# Patient Record
Sex: FEMALE | Race: WHITE | NOT HISPANIC OR LATINO | URBAN - METROPOLITAN AREA
[De-identification: names, ages, dates, MRNs, and addresses within clinical notes are randomized per-mention and may not be internally consistent; named-entity substitution may affect disease eponyms.]

---

## 2017-11-02 ENCOUNTER — OUTPATIENT (OUTPATIENT)
Dept: OUTPATIENT SERVICES | Facility: HOSPITAL | Age: 82
LOS: 1 days | End: 2017-11-02
Payer: MEDICARE

## 2017-11-02 ENCOUNTER — APPOINTMENT (OUTPATIENT)
Dept: MAMMOGRAPHY | Facility: IMAGING CENTER | Age: 82
End: 2017-11-02
Payer: MEDICARE

## 2017-11-02 DIAGNOSIS — Z00.8 ENCOUNTER FOR OTHER GENERAL EXAMINATION: ICD-10-CM

## 2017-11-02 PROCEDURE — G0202: CPT | Mod: 26

## 2017-11-02 PROCEDURE — 77067 SCR MAMMO BI INCL CAD: CPT

## 2017-11-02 PROCEDURE — 77063 BREAST TOMOSYNTHESIS BI: CPT

## 2017-11-02 PROCEDURE — 77063 BREAST TOMOSYNTHESIS BI: CPT | Mod: 26

## 2018-11-07 ENCOUNTER — APPOINTMENT (OUTPATIENT)
Dept: MAMMOGRAPHY | Facility: IMAGING CENTER | Age: 83
End: 2018-11-07
Payer: MEDICARE

## 2018-11-07 ENCOUNTER — OUTPATIENT (OUTPATIENT)
Dept: OUTPATIENT SERVICES | Facility: HOSPITAL | Age: 83
LOS: 1 days | End: 2018-11-07
Payer: MEDICARE

## 2018-11-07 DIAGNOSIS — Z00.00 ENCOUNTER FOR GENERAL ADULT MEDICAL EXAMINATION WITHOUT ABNORMAL FINDINGS: ICD-10-CM

## 2018-11-07 PROCEDURE — 77067 SCR MAMMO BI INCL CAD: CPT | Mod: 26

## 2018-11-07 PROCEDURE — 77067 SCR MAMMO BI INCL CAD: CPT

## 2019-11-08 ENCOUNTER — APPOINTMENT (OUTPATIENT)
Dept: MAMMOGRAPHY | Facility: IMAGING CENTER | Age: 84
End: 2019-11-08
Payer: MEDICARE

## 2019-11-08 ENCOUNTER — OUTPATIENT (OUTPATIENT)
Dept: OUTPATIENT SERVICES | Facility: HOSPITAL | Age: 84
LOS: 1 days | End: 2019-11-08
Payer: MEDICARE

## 2019-11-08 DIAGNOSIS — Z00.8 ENCOUNTER FOR OTHER GENERAL EXAMINATION: ICD-10-CM

## 2019-11-08 PROCEDURE — 77067 SCR MAMMO BI INCL CAD: CPT | Mod: 26

## 2019-11-08 PROCEDURE — 77067 SCR MAMMO BI INCL CAD: CPT

## 2019-11-08 PROCEDURE — 77063 BREAST TOMOSYNTHESIS BI: CPT

## 2019-11-08 PROCEDURE — 77063 BREAST TOMOSYNTHESIS BI: CPT | Mod: 26

## 2019-12-17 ENCOUNTER — RESULT REVIEW (OUTPATIENT)
Age: 84
End: 2019-12-17

## 2019-12-20 ENCOUNTER — RESULT REVIEW (OUTPATIENT)
Age: 84
End: 2019-12-20

## 2019-12-30 ENCOUNTER — APPOINTMENT (OUTPATIENT)
Dept: THORACIC SURGERY | Facility: CLINIC | Age: 84
End: 2019-12-30
Payer: MEDICARE

## 2019-12-30 VITALS
RESPIRATION RATE: 15 BRPM | WEIGHT: 152 LBS | OXYGEN SATURATION: 98 % | TEMPERATURE: 98.1 F | SYSTOLIC BLOOD PRESSURE: 165 MMHG | BODY MASS INDEX: 28.33 KG/M2 | HEART RATE: 81 BPM | DIASTOLIC BLOOD PRESSURE: 83 MMHG | HEIGHT: 61.5 IN

## 2019-12-30 VITALS — SYSTOLIC BLOOD PRESSURE: 173 MMHG | DIASTOLIC BLOOD PRESSURE: 80 MMHG

## 2019-12-30 DIAGNOSIS — Z82.49 FAMILY HISTORY OF ISCHEMIC HEART DISEASE AND OTHER DISEASES OF THE CIRCULATORY SYSTEM: ICD-10-CM

## 2019-12-30 DIAGNOSIS — Z85.89 PERSONAL HISTORY OF MALIGNANT NEOPLASM OF OTHER ORGANS AND SYSTEMS: ICD-10-CM

## 2019-12-30 DIAGNOSIS — Z87.09 PERSONAL HISTORY OF OTHER DISEASES OF THE RESPIRATORY SYSTEM: ICD-10-CM

## 2019-12-30 DIAGNOSIS — Z80.0 FAMILY HISTORY OF MALIGNANT NEOPLASM OF DIGESTIVE ORGANS: ICD-10-CM

## 2019-12-30 DIAGNOSIS — S72.001D FRACTURE OF UNSPECIFIED PART OF NECK OF RIGHT FEMUR, SUBSEQUENT ENCOUNTER FOR CLOSED FRACTURE WITH ROUTINE HEALING: ICD-10-CM

## 2019-12-30 DIAGNOSIS — Z80.8 FAMILY HISTORY OF MALIGNANT NEOPLASM OF OTHER ORGANS OR SYSTEMS: ICD-10-CM

## 2019-12-30 DIAGNOSIS — Z86.03 PERSONAL HISTORY OF NEOPLASM OF UNCERTAIN BEHAVIOR: ICD-10-CM

## 2019-12-30 PROCEDURE — 99205 OFFICE O/P NEW HI 60 MIN: CPT

## 2019-12-30 RX ORDER — BETAMETHASONE DIPROPIONATE 0.5 MG/G
0.05 CREAM, AUGMENTED TOPICAL
Qty: 50 | Refills: 0 | Status: DISCONTINUED | COMMUNITY
Start: 2019-07-25

## 2020-01-07 ENCOUNTER — FORM ENCOUNTER (OUTPATIENT)
Age: 85
End: 2020-01-07

## 2020-01-08 ENCOUNTER — OUTPATIENT (OUTPATIENT)
Dept: OUTPATIENT SERVICES | Facility: HOSPITAL | Age: 85
LOS: 1 days | End: 2020-01-08
Payer: MEDICARE

## 2020-01-08 ENCOUNTER — APPOINTMENT (OUTPATIENT)
Dept: NUCLEAR MEDICINE | Facility: IMAGING CENTER | Age: 85
End: 2020-01-08
Payer: MEDICARE

## 2020-01-08 DIAGNOSIS — R91.1 SOLITARY PULMONARY NODULE: ICD-10-CM

## 2020-01-08 PROCEDURE — A9552: CPT

## 2020-01-08 PROCEDURE — 78815 PET IMAGE W/CT SKULL-THIGH: CPT | Mod: 26,PI

## 2020-01-08 PROCEDURE — 78815 PET IMAGE W/CT SKULL-THIGH: CPT

## 2020-01-28 ENCOUNTER — OUTPATIENT (OUTPATIENT)
Dept: OUTPATIENT SERVICES | Facility: HOSPITAL | Age: 85
LOS: 1 days | End: 2020-01-28
Payer: MEDICARE

## 2020-01-28 VITALS
SYSTOLIC BLOOD PRESSURE: 154 MMHG | WEIGHT: 153 LBS | HEIGHT: 59 IN | RESPIRATION RATE: 16 BRPM | DIASTOLIC BLOOD PRESSURE: 90 MMHG | OXYGEN SATURATION: 100 % | HEART RATE: 55 BPM | TEMPERATURE: 98 F

## 2020-01-28 DIAGNOSIS — Z96.641 PRESENCE OF RIGHT ARTIFICIAL HIP JOINT: Chronic | ICD-10-CM

## 2020-01-28 DIAGNOSIS — J90 PLEURAL EFFUSION, NOT ELSEWHERE CLASSIFIED: ICD-10-CM

## 2020-01-28 LAB
ANION GAP SERPL CALC-SCNC: 15 MMO/L — HIGH (ref 7–14)
BLD GP AB SCN SERPL QL: NEGATIVE — SIGNIFICANT CHANGE UP
BUN SERPL-MCNC: 24 MG/DL — HIGH (ref 7–23)
CALCIUM SERPL-MCNC: 9.8 MG/DL — SIGNIFICANT CHANGE UP (ref 8.4–10.5)
CHLORIDE SERPL-SCNC: 100 MMOL/L — SIGNIFICANT CHANGE UP (ref 98–107)
CO2 SERPL-SCNC: 24 MMOL/L — SIGNIFICANT CHANGE UP (ref 22–31)
CREAT SERPL-MCNC: 0.86 MG/DL — SIGNIFICANT CHANGE UP (ref 0.5–1.3)
GLUCOSE SERPL-MCNC: 95 MG/DL — SIGNIFICANT CHANGE UP (ref 70–99)
HBA1C BLD-MCNC: 6.1 % — HIGH (ref 4–5.6)
HCT VFR BLD CALC: 41 % — SIGNIFICANT CHANGE UP (ref 34.5–45)
HGB BLD-MCNC: 13 G/DL — SIGNIFICANT CHANGE UP (ref 11.5–15.5)
MCHC RBC-ENTMCNC: 28.6 PG — SIGNIFICANT CHANGE UP (ref 27–34)
MCHC RBC-ENTMCNC: 31.7 % — LOW (ref 32–36)
MCV RBC AUTO: 90.1 FL — SIGNIFICANT CHANGE UP (ref 80–100)
NRBC # FLD: 0 K/UL — SIGNIFICANT CHANGE UP (ref 0–0)
PLATELET # BLD AUTO: 196 K/UL — SIGNIFICANT CHANGE UP (ref 150–400)
PMV BLD: 10.8 FL — SIGNIFICANT CHANGE UP (ref 7–13)
POTASSIUM SERPL-MCNC: 4.4 MMOL/L — SIGNIFICANT CHANGE UP (ref 3.5–5.3)
POTASSIUM SERPL-SCNC: 4.4 MMOL/L — SIGNIFICANT CHANGE UP (ref 3.5–5.3)
RBC # BLD: 4.55 M/UL — SIGNIFICANT CHANGE UP (ref 3.8–5.2)
RBC # FLD: 14.3 % — SIGNIFICANT CHANGE UP (ref 10.3–14.5)
RH IG SCN BLD-IMP: POSITIVE — SIGNIFICANT CHANGE UP
SODIUM SERPL-SCNC: 139 MMOL/L — SIGNIFICANT CHANGE UP (ref 135–145)
WBC # BLD: 5.04 K/UL — SIGNIFICANT CHANGE UP (ref 3.8–10.5)
WBC # FLD AUTO: 5.04 K/UL — SIGNIFICANT CHANGE UP (ref 3.8–10.5)

## 2020-01-28 PROCEDURE — 93010 ELECTROCARDIOGRAM REPORT: CPT

## 2020-01-28 RX ORDER — SODIUM CHLORIDE 9 MG/ML
1000 INJECTION, SOLUTION INTRAVENOUS
Refills: 0 | Status: DISCONTINUED | OUTPATIENT
Start: 2020-02-04 | End: 2020-02-05

## 2020-01-28 NOTE — H&P PST ADULT - HEIGHT IN FEET
Terence Gil  [id number]  January 18, 2018    Chief Complaint   Patient presents with    Post-Op Check     Right TKA 1/3/18. Pain remains constant             History: The patient is here follow-up regarding her right knee. The patient is now approximately 2 weeks status post right total knee arthroplasty. She continues to have moderate to severe pain. The patient does have numerous medical issues including chronic pain syndrome and lumbar stenosis. She feels that the pain is radiating from her back. The patient's  past medical history, medications, allergies,  family history, social history, and review of systems have been reviewed, and dated and are recorded in the chart. Ht 5' 6\" (1.676 m)   Wt 168 lb (76.2 kg)   Breastfeeding? No   BMI 27.12 kg/m²     Physical: Ms. Terence Gil appears well, she is in no apparent distress, she demonstrates appropriate mood & affect. She is alert and oriented to person, place and time. She has mild swelling. There is No evidence of DVT seen on physical exam.. She is neurovascularly intact distally. Range of motion is from 0 degrees to 90 degrees. The incision is  clean, dry and intact and without erythema. Strength in the knee is 3+/5. There is no instability with varus and valgus stressing of the knee. There is no pain with range of motion of the hips. Xrays: Three views of the right knee were obtained and reviewed. The prosthesis is well aligned and there is no evidence of loosening. Impression: Status post right Total Knee Arthroplasty      Plan:  She will continue to work aggressively on range of motion and strengthening: Natural history and expected course discussed. Questions answered. Quad strengthening exercises. The patient plans to be discharged from [facility]. I agree with her discharge home tomorrow. I would like the patient to participate in a home physical therapy program.  She was given a Medrol Dosepak.   Hopefully the 4

## 2020-01-28 NOTE — H&P PST ADULT - NSICDXPASTMEDICALHX_GEN_ALL_CORE_FT
PAST MEDICAL HISTORY:  Atrial fibrillation     Basal cell carcinoma and squamous cell carcinoma removed    H/O pleural effusion     HTN (hypertension)     Hyperlipidemia     Prediabetes

## 2020-01-28 NOTE — H&P PST ADULT - NSICDXPROBLEM_GEN_ALL_CORE_FT
PROBLEM DIAGNOSES  Problem: Pleural effusion  Assessment and Plan:       R/O PROBLEM DIAGNOSES  Problem: Snoring  Assessment and Plan:     Problem: Pleural effusion  Assessment and Plan: PROBLEM DIAGNOSES  Problem: Pleural effusion  Assessment and Plan: Pt scheduled for felxible bronchoscopy, left VATS, possible pleurodesis, biopsy, navigational bronchoscopy on 2/4/20.   labs pending, EKG in chart. preop instructions provided to pt. famotidine and chlorhexidine scrubs provided to pt with instructions.      R/O PROBLEM DIAGNOSES  Problem: Atrial fibrillation  Assessment and Plan: Pt had cardiac clearance - will obtain copy    Problem: Snoring  Assessment and Plan: NAIMA precaution  recommended.   OR booking notified via fax

## 2020-01-28 NOTE — H&P PST ADULT - ASSESSMENT
86 yrs old female with h/o SOB, and found to have pleural effusion in Dec 2019 and had paracentesis but for continued fluid collection pt presents for preop eval to have flexible bronchoscopy left VATS, possible pleurodesis, biopsy, navigational bronchoscopy on 2/4/20

## 2020-01-28 NOTE — H&P PST ADULT - RS GEN PE MLT RESP DETAILS PC
clear to auscultation bilaterally/no rhonchi/airway patent/respirations non-labored/no rales/no wheezes

## 2020-01-28 NOTE — H&P PST ADULT - NSANTHOSAYNRD_GEN_A_CORE
never tested/No. NAIMA screening performed.  STOP BANG Legend: 0-2 = LOW Risk; 3-4 = INTERMEDIATE Risk; 5-8 = HIGH Risk

## 2020-01-30 NOTE — REVIEW OF SYSTEMS
[Lower Ext Edema] : lower extremity edema [Cough] : cough [SOB on Exertion] : shortness of breath during exertion [Orthopnea] : orthopnea [Negative] : Heme/Lymph [FreeTextEntry6] : SOB with walking and has 2 pillow orthopnea. [FreeTextEntry5] : 1-2 + - LLE

## 2020-01-30 NOTE — PHYSICAL EXAM
[General Appearance - Alert] : alert [Sclera] : the sclera and conjunctiva were normal [Strabismus] : no strabismus was seen [General Appearance - In No Acute Distress] : in no acute distress [Hearing Threshold Finger Rub Not [place]] : hearing was normal [Outer Ear] : the ears and nose were normal in appearance [Jugular Venous Distention Increased] : there was no jugular-venous distention [Neck Appearance] : the appearance of the neck was normal [] : no respiratory distress [Respiration, Rhythm And Depth] : normal respiratory rhythm and effort [Auscultation Breath Sounds / Voice Sounds] : lungs were clear to auscultation bilaterally [Murmurs] : no murmurs [Heart Sounds] : normal S1 and S2 [Heart Rate And Rhythm] : heart rate was normal and rhythm regular [1+] : left 1+ [Superficial] : superficial [Bowel Sounds] : normal bowel sounds [Abdomen Soft] : soft [Abdomen Tenderness] : non-tender [Cervical Lymph Nodes Enlarged Posterior Bilaterally] : posterior cervical [Cervical Lymph Nodes Enlarged Anterior Bilaterally] : anterior cervical [Supraclavicular Lymph Nodes Enlarged Bilaterally] : supraclavicular [Abnormal Walk] : normal gait [Skin Color & Pigmentation] : normal skin color and pigmentation [Skin Turgor] : normal skin turgor [No Focal Deficits] : no focal deficits [Oriented To Time, Place, And Person] : oriented to person, place, and time [Impaired Insight] : insight and judgment were intact [FreeTextEntry1] : Slightly diminished at the left bases.

## 2020-01-30 NOTE — CONSULT LETTER
[Dear  ___] : Dear  [unfilled], [Please see my note below.] : Please see my note below. [Consult Letter:] : I had the pleasure of evaluating your patient, [unfilled]. [Consult Closing:] : Thank you very much for allowing me to participate in the care of this patient.  If you have any questions, please do not hesitate to contact me. [Sincerely,] : Sincerely, [FreeTextEntry2] : Dr. Milton Rojas ( PCP )\par Dr. Quan Garcia ( Pulm / Ref ) [FreeTextEntry3] : Ranjit Tillman MD, FACS \par , Division of Thoracic Surgery \par [facility] \par Chief, Thoracic Surgery \par [facility] \par Department of Cardiovascular & Thoracic Surgery \par  \par [facility]\par \par

## 2020-01-30 NOTE — HISTORY OF PRESENT ILLNESS
[FreeTextEntry1] : LAURI MENDEZ is a 86 year old Female , non smoker with medical history of HTN, prior basal cell cancer in her nose 10 years ago  which was removed and squamous cell cancer in 2014 in her LLE which was also removed presenting for a consultation for pleural effusion referred by her Pulmonologist Dr. Garcia.\par \par She recently was not feeling well in the beginning of 12/2019 with shortness of breath and cough. She was seen at her PCP 's office and Chest X-Ray was done which revealed Pleural effusion. She was referred to Dr. Garcia where she had the Pleural effusion  drained twice from the left side. Pathology was negative for malignant cells.\par \par CT scan on 12/21/2019 revealed:\par - Subsegmental atelectasis in the LLL. Ovoid density in the lingula segment abutting the fissure.\par - 4 mm groundglass nodule in the RML.\par - Left apical subpleural nodule measuring 2 mm.\par - Mild to moderate partially loculated left pleural effusion. \par \par PFT's on 12/23/2019 showed:\par FVC - 1.38 ( 76%) FEV1 - 1.06 ( 77 % ) , DLCO - 10.3 ( 62%)\par \par Today in the office she reports dyspnea with mild to moderate activities. She denies chest pain, shortness of breath, cough, hemoptysis, fever, palpitations, syncope, URI or recent illness.\par \par \par \par \par

## 2020-01-30 NOTE — ASSESSMENT
[FreeTextEntry1] : 86 year old Female , presenting for a consultation for Pleural effusion  referred by her Pulmonologist Dr. Garcia. She had left sided thoracentesis with path showing negative for malignant cells.\par \par CT scan on 12/21/2019 revealed:\par - Subsegmental atelectasis in the LLL. Ovoid density in the lingula segment abutting the fissure.\par - 4 mm groundglass nodule in the RML.\par - Left apical subpleural nodule measuring 2 mm.\par - Mild to moderate partially loculated left pleural effusion. \par \par I have reviewed the patient's medical records and diagnostic images during the time of this office visit, and I have made the following recommendation: \par 1. Complete a PET scan before surgery.\par 2. Medical Clearance\par 3. Schedule for Left VATS , Possible pleurodesis , Pleural biopsy , Navigational Bronchoscopy. The risks, benefits, and alternatives to the procedure and expectations were discussed with the patient at length. All of the patient's questions were answered . She verbalized understanding, and are in agreement with the above treatment plan.\par \par I personally performed the services described in the documentation, reviewed the documentation recorded by the scribe in my presence and it accurately and completely records my words and actions.\par \par “I Marnie Jo RN am scribing for and the presence of Dr. Ranjit Tillman,  the following sections , History of present illness , Past medical / surgical /  Family /social history ; review of systems; Vital signs; Physical examination and disposition. “\par \par \par \par \par \par \par

## 2020-02-03 ENCOUNTER — FORM ENCOUNTER (OUTPATIENT)
Age: 85
End: 2020-02-03

## 2020-02-03 ENCOUNTER — TRANSCRIPTION ENCOUNTER (OUTPATIENT)
Age: 85
End: 2020-02-03

## 2020-02-04 ENCOUNTER — RESULT REVIEW (OUTPATIENT)
Age: 85
End: 2020-02-04

## 2020-02-04 ENCOUNTER — TRANSCRIPTION ENCOUNTER (OUTPATIENT)
Age: 85
End: 2020-02-04

## 2020-02-04 ENCOUNTER — INPATIENT (INPATIENT)
Facility: HOSPITAL | Age: 85
LOS: 0 days | Discharge: HOME | End: 2020-02-05
Attending: THORACIC SURGERY (CARDIOTHORACIC VASCULAR SURGERY) | Admitting: THORACIC SURGERY (CARDIOTHORACIC VASCULAR SURGERY)
Payer: MEDICARE

## 2020-02-04 ENCOUNTER — APPOINTMENT (OUTPATIENT)
Dept: THORACIC SURGERY | Facility: HOSPITAL | Age: 85
End: 2020-02-04

## 2020-02-04 VITALS
TEMPERATURE: 98 F | WEIGHT: 153 LBS | RESPIRATION RATE: 18 BRPM | HEIGHT: 59 IN | OXYGEN SATURATION: 98 % | HEART RATE: 74 BPM | SYSTOLIC BLOOD PRESSURE: 149 MMHG | DIASTOLIC BLOOD PRESSURE: 73 MMHG

## 2020-02-04 DIAGNOSIS — J90 PLEURAL EFFUSION, NOT ELSEWHERE CLASSIFIED: ICD-10-CM

## 2020-02-04 DIAGNOSIS — Z96.641 PRESENCE OF RIGHT ARTIFICIAL HIP JOINT: Chronic | ICD-10-CM

## 2020-02-04 LAB
GLUCOSE BLDC GLUCOMTR-MCNC: 118 MG/DL — HIGH (ref 70–99)
GRAM STN FLD: SIGNIFICANT CHANGE UP
RH IG SCN BLD-IMP: POSITIVE — SIGNIFICANT CHANGE UP
SPECIMEN SOURCE: SIGNIFICANT CHANGE UP

## 2020-02-04 PROCEDURE — 88342 IMHCHEM/IMCYTCHM 1ST ANTB: CPT | Mod: 26,59

## 2020-02-04 PROCEDURE — 88341 IMHCHEM/IMCYTCHM EA ADD ANTB: CPT | Mod: 26

## 2020-02-04 PROCEDURE — 88112 CYTOPATH CELL ENHANCE TECH: CPT | Mod: 26

## 2020-02-04 PROCEDURE — 88305 TISSUE EXAM BY PATHOLOGIST: CPT | Mod: 26

## 2020-02-04 PROCEDURE — 71045 X-RAY EXAM CHEST 1 VIEW: CPT | Mod: 26

## 2020-02-04 RX ORDER — ATORVASTATIN CALCIUM 80 MG/1
40 TABLET, FILM COATED ORAL AT BEDTIME
Refills: 0 | Status: DISCONTINUED | OUTPATIENT
Start: 2020-02-04 | End: 2020-02-05

## 2020-02-04 RX ORDER — NALOXONE HYDROCHLORIDE 4 MG/.1ML
0.1 SPRAY NASAL
Refills: 0 | Status: DISCONTINUED | OUTPATIENT
Start: 2020-02-04 | End: 2020-02-05

## 2020-02-04 RX ORDER — HEPARIN SODIUM 5000 [USP'U]/ML
5000 INJECTION INTRAVENOUS; SUBCUTANEOUS EVERY 12 HOURS
Refills: 0 | Status: DISCONTINUED | OUTPATIENT
Start: 2020-02-04 | End: 2020-02-05

## 2020-02-04 RX ORDER — HYDROMORPHONE HYDROCHLORIDE 2 MG/ML
30 INJECTION INTRAMUSCULAR; INTRAVENOUS; SUBCUTANEOUS
Refills: 0 | Status: DISCONTINUED | OUTPATIENT
Start: 2020-02-04 | End: 2020-02-05

## 2020-02-04 RX ORDER — ONDANSETRON 8 MG/1
4 TABLET, FILM COATED ORAL EVERY 6 HOURS
Refills: 0 | Status: DISCONTINUED | OUTPATIENT
Start: 2020-02-04 | End: 2020-02-05

## 2020-02-04 RX ORDER — FAMOTIDINE 10 MG/ML
20 INJECTION INTRAVENOUS EVERY 12 HOURS
Refills: 0 | Status: DISCONTINUED | OUTPATIENT
Start: 2020-02-04 | End: 2020-02-05

## 2020-02-04 RX ORDER — HEPARIN SODIUM 5000 [USP'U]/ML
5000 INJECTION INTRAVENOUS; SUBCUTANEOUS ONCE
Refills: 0 | Status: COMPLETED | OUTPATIENT
Start: 2020-02-04 | End: 2020-02-04

## 2020-02-04 RX ORDER — ONDANSETRON 8 MG/1
4 TABLET, FILM COATED ORAL ONCE
Refills: 0 | Status: DISCONTINUED | OUTPATIENT
Start: 2020-02-04 | End: 2020-02-04

## 2020-02-04 RX ORDER — HYDROMORPHONE HYDROCHLORIDE 2 MG/ML
0.5 INJECTION INTRAMUSCULAR; INTRAVENOUS; SUBCUTANEOUS
Refills: 0 | Status: DISCONTINUED | OUTPATIENT
Start: 2020-02-04 | End: 2020-02-05

## 2020-02-04 RX ORDER — BENZOCAINE AND MENTHOL 5; 1 G/100ML; G/100ML
1 LIQUID ORAL
Refills: 0 | Status: DISCONTINUED | OUTPATIENT
Start: 2020-02-04 | End: 2020-02-05

## 2020-02-04 RX ORDER — ACETAMINOPHEN 500 MG
650 TABLET ORAL ONCE
Refills: 0 | Status: COMPLETED | OUTPATIENT
Start: 2020-02-04 | End: 2020-02-04

## 2020-02-04 RX ORDER — AMLODIPINE BESYLATE 2.5 MG/1
5 TABLET ORAL DAILY
Refills: 0 | Status: DISCONTINUED | OUTPATIENT
Start: 2020-02-05 | End: 2020-02-05

## 2020-02-04 RX ORDER — ASPIRIN/CALCIUM CARB/MAGNESIUM 324 MG
81 TABLET ORAL DAILY
Refills: 0 | Status: DISCONTINUED | OUTPATIENT
Start: 2020-02-04 | End: 2020-02-05

## 2020-02-04 RX ORDER — METOPROLOL TARTRATE 50 MG
25 TABLET ORAL
Refills: 0 | Status: DISCONTINUED | OUTPATIENT
Start: 2020-02-04 | End: 2020-02-05

## 2020-02-04 RX ORDER — HYDROMORPHONE HYDROCHLORIDE 2 MG/ML
0.25 INJECTION INTRAMUSCULAR; INTRAVENOUS; SUBCUTANEOUS
Refills: 0 | Status: DISCONTINUED | OUTPATIENT
Start: 2020-02-04 | End: 2020-02-04

## 2020-02-04 RX ADMIN — Medication 81 MILLIGRAM(S): at 12:48

## 2020-02-04 RX ADMIN — HEPARIN SODIUM 5000 UNIT(S): 5000 INJECTION INTRAVENOUS; SUBCUTANEOUS at 07:18

## 2020-02-04 RX ADMIN — Medication 25 MILLIGRAM(S): at 21:41

## 2020-02-04 RX ADMIN — BENZOCAINE AND MENTHOL 1 LOZENGE: 5; 1 LIQUID ORAL at 12:48

## 2020-02-04 RX ADMIN — HYDROMORPHONE HYDROCHLORIDE 30 MILLILITER(S): 2 INJECTION INTRAMUSCULAR; INTRAVENOUS; SUBCUTANEOUS at 10:59

## 2020-02-04 RX ADMIN — Medication 650 MILLIGRAM(S): at 22:24

## 2020-02-04 RX ADMIN — SODIUM CHLORIDE 30 MILLILITER(S): 9 INJECTION, SOLUTION INTRAVENOUS at 07:18

## 2020-02-04 RX ADMIN — HYDROMORPHONE HYDROCHLORIDE 30 MILLILITER(S): 2 INJECTION INTRAMUSCULAR; INTRAVENOUS; SUBCUTANEOUS at 19:14

## 2020-02-04 RX ADMIN — SODIUM CHLORIDE 30 MILLILITER(S): 9 INJECTION, SOLUTION INTRAVENOUS at 18:49

## 2020-02-04 RX ADMIN — FAMOTIDINE 20 MILLIGRAM(S): 10 INJECTION INTRAVENOUS at 22:50

## 2020-02-04 RX ADMIN — Medication 650 MILLIGRAM(S): at 23:56

## 2020-02-04 NOTE — PROGRESS NOTE ADULT - SUBJECTIVE AND OBJECTIVE BOX
Subjective: s/p flex bronch, left vats, drainage of pleural effusion    Vital Signs:  Vital Signs Last 24 Hrs  T(C): 36.6 (02-04-20 @ 14:50), Max: 36.8 (02-04-20 @ 06:37)  T(F): 97.8 (02-04-20 @ 14:50), Max: 98.2 (02-04-20 @ 06:37)  HR: 92 (02-04-20 @ 14:50) (67 - 92)  BP: 140/64 (02-04-20 @ 14:50) (103/60 - 149/73)  RR: 18 (02-04-20 @ 14:50) (12 - 21)  SpO2: 95% (02-04-20 @ 14:50) (94% - 100%) on (O2)    Telemetry/Alarms:  General: WN/WD NAD  Neurology: Awake, nonfocal, MANZO x 4  Eyes: Scleras clear, PERRLA/ EOMI, Gross vision intact  ENT:Gross hearing intact, grossly patent pharynx, no stridor  Neck: Neck supple, trachea midline, No JVD,   Respiratory: decreased left side, No wheezing, rales, rhonchi, left chest tube to suction   CV: RRR, S1S2, no murmurs, rubs or gallops  Abdominal: Soft, NT, ND +BS,   Extremities: No edema, + peripheral pulses  Skin: No Rashes, Hematoma, Ecchymosis  Lymphatic: No Neck, axilla, groin LAD  Psych: Oriented x 3, normal affect  Incisions: with dressing clean and dry   Tubes: left chest tube to suction, no air leak noted   Relevant labs, radiology and Medications reviewed            MEDICATIONS  (STANDING):  aspirin enteric coated 81 milliGRAM(s) Oral daily  atorvastatin 40 milliGRAM(s) Oral at bedtime  benzocaine 15 mG/menthol 3.6 mG (Sugar-Free) Lozenge 1 Lozenge Oral four times a day  famotidine    Tablet 20 milliGRAM(s) Oral every 12 hours  HYDROmorphone PCA (1 mG/mL) 30 milliLiter(s) PCA Continuous PCA Continuous  lactated ringers. 1000 milliLiter(s) (30 mL/Hr) IV Continuous <Continuous>    MEDICATIONS  (PRN):  HYDROmorphone PCA (1 mG/mL) Rescue Clinician Bolus 0.5 milliGRAM(s) IV Push every 15 minutes PRN for Pain Scale GREATER THAN 6  naloxone Injectable 0.1 milliGRAM(s) IV Push every 3 minutes PRN For ANY of the following changes in patient status:  A. RR LESS THAN 10 breaths per minute, B. Oxygen saturation LESS THAN 90%, C. Sedation score of 6  ondansetron Injectable 4 milliGRAM(s) IV Push every 6 hours PRN Nausea    Pertinent Physical Exam  I&O's Summary    04 Feb 2020 07:01  -  04 Feb 2020 19:35  --------------------------------------------------------  IN: 470 mL / OUT: 420 mL / NET: 50 mL        Assessment  86y Female  w/ PAST MEDICAL & SURGICAL HISTORY:  Atrial fibrillation  Basal cell carcinoma: and squamous cell carcinoma removed  Prediabetes  Hyperlipidemia  HTN (hypertension)  H/O pleural effusion  History of total right hip replacement: left 2015  admitted with complaints of Patient is a 86y old  Female who presents with a chief complaint of .  On 2/4/20, patient underwent Lobectomy, lung, using VATS if indicated  Flexible bronchoscopy with bronchopulmonary lavage  . Postoperative course/issues:  pain   PLAN  Neuro: Pain management, on IV PCA   Pulm: Encourage coughing, deep breathing and use of incentive spirometry. Wean off supplemental oxygen as able. Daily CXR.   Cardio: Monitor telemetry/alarms  GI: Tolerating diet. Continue stool softeners.  Renal: monitor urine output, supplement electrolytes as needed  Vasc: Heparin SC/SCDs for DVT prophylaxis  Heme: Stable H/H. .   ID: Off antibiotics. Stable.  Therapy: OOB/ambulate  Tubes: Monitor Chest tube output  Disposition: Aim to D/C to home when chest tube removed   Discussed with Cardiothoracic Team at AM rounds.

## 2020-02-04 NOTE — DISCHARGE NOTE PROVIDER - HOSPITAL COURSE
86 yrs old female with h/o SOB, and found to have pleural effusion in Dec 2019 and had paracentesis but for continued fluid collection.  Patient s/p flex bronch, left vats, drainage of pleural effusion on 2/4/20.  Patient stable for discharge home when chest tube removed. 86 yr old female with PMHx of pleural effusion and fluid removal in Dec 2019, who was admitted on 2/4/20 and underwent left vats, drainage of pleural effusion.  Left Pleural Chest tube was removed earlier this morning, and she is to be discharged home today.  CXR s/p chest tube removal without pneumothorax, and she is hemodynamically stable.  She denies any chest pains or SOB at this time. 86 yr old female with PMHx of pleural effusion and fluid removal in Dec 2019, who was admitted on 2/4/20 and underwent left vats, drainage of pleural effusion.  Left Pleural Chest tube was removed earlier this morning, and she is to be discharged home today.  CXR s/p chest tube removal without pneumothorax, and she is hemodynamically stable.  She denies any chest pains or SOB at this time.  Seen at bedside this morning with Dr. Tillman.

## 2020-02-04 NOTE — DISCHARGE NOTE PROVIDER - NSDCMRMEDTOKEN_GEN_ALL_CORE_FT
amLODIPine 5 mg oral tablet: 1 tab(s) orally once a day  aspirin 81 mg oral tablet: 1 tab(s) orally once a day; last dose on 1/28/20  Calcium 600+D 600 mg-200 intl units (5 mcg) oral tablet: daily  Glucosamine &amp; Chondroitin with MSM oral tablet: 1 tab daily  metoprolol tartrate 50 mg oral tablet: 1 tab(s) orally daily  Probiotic Formula oral capsule: 1 cap(s) orally once a day  ramipril 10 mg oral capsule: 1 cap(s) orally once a day  rosuvastatin 5 mg oral capsule: 1 cap(s) orally once a day acetaminophen 325 mg oral tablet: 2 tab(s) orally every 6 hours, As needed, Moderate Pain (4 - 6)  amLODIPine 5 mg oral tablet: 1 tab(s) orally once a day  aspirin 81 mg oral tablet: 1 tab(s) orally once a day; last dose on 1/28/20  Calcium 600+D 600 mg-200 intl units (5 mcg) oral tablet: daily  Glucosamine &amp; Chondroitin with MSM oral tablet: 1 tab daily  metoprolol tartrate 50 mg oral tablet: 1 tab(s) orally daily  Probiotic Formula oral capsule: 1 cap(s) orally once a day  ramipril 10 mg oral capsule: 1 cap(s) orally once a day  rosuvastatin 5 mg oral capsule: 1 cap(s) orally once a day  senna oral tablet: 2 tab(s) orally once a day MDD:2 tabs

## 2020-02-04 NOTE — BRIEF OPERATIVE NOTE - NSICDXBRIEFPREOP_GEN_ALL_CORE_FT
PRE-OP DIAGNOSIS:  Lung nodules 04-Feb-2020 10:28:22  Sim Ambriz  Pleural effusion 04-Feb-2020 10:28:06  Sim Ambriz

## 2020-02-04 NOTE — DISCHARGE NOTE PROVIDER - CARE PROVIDER_API CALL
Ranjit Tillman)  Thoracic Surgery  [id number] [place] [address]  Phone: [phone]  Fax: [phone]  Follow Up Time:

## 2020-02-04 NOTE — DISCHARGE NOTE PROVIDER - NSDCFUADDINST_GEN_ALL_CORE_FT
Please walk 4-5 x per day; increase as tolerated. You may climb stairs. Continue to use the incentive spirometer.   You may keep wounds uncovered. Please shower daily with soap and water. The suture will be removed in the office at the follow up appointment.   Please call the office at [phone] if you have fevers, chills, worsening shortness of breath, chest pain, warmth, redness or purulent discharge from the wound. Please walk 4-5 x per day; increase as tolerated. You may climb stairs. Continue to use the incentive spirometer.   You may keep wounds uncovered. Please shower daily with soap and water. The suture will be removed in the office at the follow up appointment.   Please call the office at [phone] if you have fevers, chills, worsening shortness of breath, chest pain, warmth, redness or purulent discharge from the wound, or any other concerning symptom.

## 2020-02-04 NOTE — DISCHARGE NOTE PROVIDER - NSDCACTIVITY_GEN_ALL_CORE
No heavy lifting/straining/Showering allowed/Do not make important decisions/Walking - Indoors allowed/Do not drive or operate machinery/Stairs allowed/Walking - Outdoors allowed

## 2020-02-04 NOTE — DISCHARGE NOTE PROVIDER - NSDCFUADDAPPT_GEN_ALL_CORE_FT
Follow up with Dr. Tillman in 7-10 days   Follow up with primary care provider in one week Follow up with Dr. Tillman in 7-10 days  Suture to be removed at follow-up appointment with Dr. Tillman    Follow up with primary care provider in one week

## 2020-02-05 ENCOUNTER — TRANSCRIPTION ENCOUNTER (OUTPATIENT)
Age: 85
End: 2020-02-05

## 2020-02-05 VITALS
DIASTOLIC BLOOD PRESSURE: 65 MMHG | RESPIRATION RATE: 18 BRPM | TEMPERATURE: 97 F | OXYGEN SATURATION: 100 % | SYSTOLIC BLOOD PRESSURE: 135 MMHG | HEART RATE: 75 BPM

## 2020-02-05 LAB
ANION GAP SERPL CALC-SCNC: 15 MMO/L — HIGH (ref 7–14)
BASOPHILS # BLD AUTO: 0.01 K/UL — SIGNIFICANT CHANGE UP (ref 0–0.2)
BASOPHILS NFR BLD AUTO: 0.1 % — SIGNIFICANT CHANGE UP (ref 0–2)
BUN SERPL-MCNC: 23 MG/DL — SIGNIFICANT CHANGE UP (ref 7–23)
CALCIUM SERPL-MCNC: 9.8 MG/DL — SIGNIFICANT CHANGE UP (ref 8.4–10.5)
CHLORIDE SERPL-SCNC: 104 MMOL/L — SIGNIFICANT CHANGE UP (ref 98–107)
CO2 SERPL-SCNC: 22 MMOL/L — SIGNIFICANT CHANGE UP (ref 22–31)
CREAT SERPL-MCNC: 0.81 MG/DL — SIGNIFICANT CHANGE UP (ref 0.5–1.3)
CULTURE - ACID FAST SMEAR CONCENTRATED: SIGNIFICANT CHANGE UP
EOSINOPHIL # BLD AUTO: 0 K/UL — SIGNIFICANT CHANGE UP (ref 0–0.5)
EOSINOPHIL NFR BLD AUTO: 0 % — SIGNIFICANT CHANGE UP (ref 0–6)
GLUCOSE SERPL-MCNC: 135 MG/DL — HIGH (ref 70–99)
HCT VFR BLD CALC: 39.2 % — SIGNIFICANT CHANGE UP (ref 34.5–45)
HGB BLD-MCNC: 12.4 G/DL — SIGNIFICANT CHANGE UP (ref 11.5–15.5)
IMM GRANULOCYTES NFR BLD AUTO: 0.3 % — SIGNIFICANT CHANGE UP (ref 0–1.5)
LYMPHOCYTES # BLD AUTO: 0.85 K/UL — LOW (ref 1–3.3)
LYMPHOCYTES # BLD AUTO: 12.6 % — LOW (ref 13–44)
MCHC RBC-ENTMCNC: 29 PG — SIGNIFICANT CHANGE UP (ref 27–34)
MCHC RBC-ENTMCNC: 31.6 % — LOW (ref 32–36)
MCV RBC AUTO: 91.6 FL — SIGNIFICANT CHANGE UP (ref 80–100)
MONOCYTES # BLD AUTO: 0.57 K/UL — SIGNIFICANT CHANGE UP (ref 0–0.9)
MONOCYTES NFR BLD AUTO: 8.4 % — SIGNIFICANT CHANGE UP (ref 2–14)
NEUTROPHILS # BLD AUTO: 5.3 K/UL — SIGNIFICANT CHANGE UP (ref 1.8–7.4)
NEUTROPHILS NFR BLD AUTO: 78.6 % — HIGH (ref 43–77)
NRBC # FLD: 0 K/UL — SIGNIFICANT CHANGE UP (ref 0–0)
PLATELET # BLD AUTO: 189 K/UL — SIGNIFICANT CHANGE UP (ref 150–400)
PMV BLD: 11 FL — SIGNIFICANT CHANGE UP (ref 7–13)
POTASSIUM SERPL-MCNC: 4 MMOL/L — SIGNIFICANT CHANGE UP (ref 3.5–5.3)
POTASSIUM SERPL-SCNC: 4 MMOL/L — SIGNIFICANT CHANGE UP (ref 3.5–5.3)
RBC # BLD: 4.28 M/UL — SIGNIFICANT CHANGE UP (ref 3.8–5.2)
RBC # FLD: 14.6 % — HIGH (ref 10.3–14.5)
SODIUM SERPL-SCNC: 141 MMOL/L — SIGNIFICANT CHANGE UP (ref 135–145)
SPECIMEN SOURCE: SIGNIFICANT CHANGE UP
SPECIMEN SOURCE: SIGNIFICANT CHANGE UP
WBC # BLD: 6.75 K/UL — SIGNIFICANT CHANGE UP (ref 3.8–10.5)
WBC # FLD AUTO: 6.75 K/UL — SIGNIFICANT CHANGE UP (ref 3.8–10.5)

## 2020-02-05 PROCEDURE — 71045 X-RAY EXAM CHEST 1 VIEW: CPT | Mod: 26

## 2020-02-05 PROCEDURE — 99238 HOSP IP/OBS DSCHRG MGMT 30/<: CPT

## 2020-02-05 RX ORDER — ATORVASTATIN CALCIUM 80 MG/1
20 TABLET, FILM COATED ORAL AT BEDTIME
Refills: 0 | Status: DISCONTINUED | OUTPATIENT
Start: 2020-02-05 | End: 2020-02-05

## 2020-02-05 RX ORDER — IPRATROPIUM/ALBUTEROL SULFATE 18-103MCG
3 AEROSOL WITH ADAPTER (GRAM) INHALATION EVERY 6 HOURS
Refills: 0 | Status: DISCONTINUED | OUTPATIENT
Start: 2020-02-05 | End: 2020-02-05

## 2020-02-05 RX ORDER — LISINOPRIL 2.5 MG/1
20 TABLET ORAL DAILY
Refills: 0 | Status: DISCONTINUED | OUTPATIENT
Start: 2020-02-05 | End: 2020-02-05

## 2020-02-05 RX ORDER — SENNA PLUS 8.6 MG/1
2 TABLET ORAL AT BEDTIME
Refills: 0 | Status: DISCONTINUED | OUTPATIENT
Start: 2020-02-05 | End: 2020-02-05

## 2020-02-05 RX ORDER — METOPROLOL TARTRATE 50 MG
50 TABLET ORAL DAILY
Refills: 0 | Status: DISCONTINUED | OUTPATIENT
Start: 2020-02-05 | End: 2020-02-05

## 2020-02-05 RX ORDER — ACETAMINOPHEN 500 MG
650 TABLET ORAL EVERY 6 HOURS
Refills: 0 | Status: DISCONTINUED | OUTPATIENT
Start: 2020-02-05 | End: 2020-02-05

## 2020-02-05 RX ORDER — SENNA PLUS 8.6 MG/1
2 TABLET ORAL
Qty: 14 | Refills: 0
Start: 2020-02-05 | End: 2020-02-11

## 2020-02-05 RX ORDER — ACETAMINOPHEN 500 MG
2 TABLET ORAL
Qty: 0 | Refills: 0 | DISCHARGE
Start: 2020-02-05

## 2020-02-05 RX ORDER — POLYETHYLENE GLYCOL 3350 17 G/17G
17 POWDER, FOR SOLUTION ORAL DAILY
Refills: 0 | Status: DISCONTINUED | OUTPATIENT
Start: 2020-02-05 | End: 2020-02-05

## 2020-02-05 RX ADMIN — POLYETHYLENE GLYCOL 3350 17 GRAM(S): 17 POWDER, FOR SOLUTION ORAL at 12:48

## 2020-02-05 RX ADMIN — Medication 3 MILLILITER(S): at 09:39

## 2020-02-05 RX ADMIN — Medication 50 MILLIGRAM(S): at 12:48

## 2020-02-05 RX ADMIN — BENZOCAINE AND MENTHOL 1 LOZENGE: 5; 1 LIQUID ORAL at 05:30

## 2020-02-05 RX ADMIN — BENZOCAINE AND MENTHOL 1 LOZENGE: 5; 1 LIQUID ORAL at 12:48

## 2020-02-05 RX ADMIN — HEPARIN SODIUM 5000 UNIT(S): 5000 INJECTION INTRAVENOUS; SUBCUTANEOUS at 05:30

## 2020-02-05 RX ADMIN — LISINOPRIL 20 MILLIGRAM(S): 2.5 TABLET ORAL at 12:48

## 2020-02-05 RX ADMIN — Medication 81 MILLIGRAM(S): at 12:48

## 2020-02-05 RX ADMIN — AMLODIPINE BESYLATE 5 MILLIGRAM(S): 2.5 TABLET ORAL at 05:30

## 2020-02-05 NOTE — PHYSICAL THERAPY INITIAL EVALUATION ADULT - ADDITIONAL COMMENTS
Pt. reports owning DME of straight cane, rolling walker.    Pt. was left seated at edge of bed post PT Evaluation, no apparent distress, all lines intact, call bell within reach.

## 2020-02-05 NOTE — PHYSICAL THERAPY INITIAL EVALUATION ADULT - GENERAL OBSERVATIONS, REHAB EVAL
Consult received, chart reviewed. Patient received standing at bedside, no apparent distress, +tele. Patient agreed to Evaluation from Physical Therapist.

## 2020-02-05 NOTE — PROGRESS NOTE ADULT - SUBJECTIVE AND OBJECTIVE BOX
Anesthesia Pain Management Service    SUBJECTIVE:    Therapy:	  [ x] IV PCA	   [ ] Epidural           [ ] s/p Spinal Opoid              [ ] Postpartum infusion	  [ ] Patient controlled regional anesthesia (PCRA)    [ ] prn Analgesics    OBJECTIVE:   [ x] No new signs     [ ] Other:    Side Effects:  [ x] None			[ ] Other:    Assessment of Catheter Site:		[ ] Intact		[ ] Other:    ASSESSMENT/PLAN  [ ] Continue current therapy    [ x] Therapy changed to:    [ ] IV PCA       [ ] Epidural     [ x] prn Analgesics     Comments:

## 2020-02-05 NOTE — PROGRESS NOTE ADULT - SUBJECTIVE AND OBJECTIVE BOX
Anesthesia Pain Management Service    SUBJECTIVE: Pt now off IV PCA without problems reported.    Therapy:	  [ X] IV PCA	   [ ] Epidural           [ ] s/p Spinal Opoid              [ ] Postpartum infusion	  [ ] Patient controlled regional anesthesia (PCRA)    [ ] prn Analgesics    Allergies    latex (Rash)  penicillins (Swelling)    Intolerances      MEDICATIONS  (STANDING):  albuterol/ipratropium for Nebulization 3 milliLiter(s) Nebulizer every 6 hours  amLODIPine   Tablet 5 milliGRAM(s) Oral daily  aspirin enteric coated 81 milliGRAM(s) Oral daily  atorvastatin 20 milliGRAM(s) Oral at bedtime  benzocaine 15 mG/menthol 3.6 mG (Sugar-Free) Lozenge 1 Lozenge Oral four times a day  famotidine    Tablet 20 milliGRAM(s) Oral every 12 hours  heparin  Injectable 5000 Unit(s) SubCutaneous every 12 hours  lactated ringers. 1000 milliLiter(s) (30 mL/Hr) IV Continuous <Continuous>  lisinopril 20 milliGRAM(s) Oral daily  metoprolol succinate ER 50 milliGRAM(s) Oral daily  polyethylene glycol 3350 17 Gram(s) Oral daily  senna 2 Tablet(s) Oral at bedtime    MEDICATIONS  (PRN):  acetaminophen   Tablet .. 650 milliGRAM(s) Oral every 6 hours PRN Moderate Pain (4 - 6)  ondansetron Injectable 4 milliGRAM(s) IV Push every 6 hours PRN Nausea      OBJECTIVE:   [X] No new signs     [ ] Other:    Side Effects:  [X ] None			[ ] Other:    Assessment of Catheter Site:		[ ] Intact		[ ] Other:    ASSESSMENT/PLAN  [ ] Continue current therapy    [X ] Therapy changed to:    [ ] IV PCA       [ ] Epidural     [ X] prn Analgesics     Comments: PRN Oral/IV opioids and/or non-opioid adjuvant analgesics to be used at this point.    Progress Note written now but Patient was seen earlier.

## 2020-02-05 NOTE — DISCHARGE NOTE NURSING/CASE MANAGEMENT/SOCIAL WORK - NSDCFUADDAPPT_GEN_ALL_CORE_FT
Follow up with Dr. Tillman in 7-10 days  Suture to be removed at follow-up appointment with Dr. Tillman    Follow up with primary care provider in one week

## 2020-02-05 NOTE — PHYSICAL THERAPY INITIAL EVALUATION ADULT - DISCHARGE DISPOSITION, PT EVAL
Home with no skilled PT needs. Pt. presents without gross impairment and appears at baseline level of function. Skilled, therapeutic PT intervention not indicated at this time. Pt. will be discharged from PT program.

## 2020-02-05 NOTE — DISCHARGE NOTE NURSING/CASE MANAGEMENT/SOCIAL WORK - PATIENT PORTAL LINK FT
You can access the FollowMyHealth Patient Portal offered by [facility] by registering at the following website: [email or web address]. By joining [facility]’s FollowMyHealth portal, you will also be able to view your health information using other applications (apps) compatible with our system.

## 2020-02-07 DIAGNOSIS — K59.00 CONSTIPATION, UNSPECIFIED: ICD-10-CM

## 2020-02-09 LAB — BACTERIA FLD CULT: SIGNIFICANT CHANGE UP

## 2020-02-11 LAB
NON-GYNECOLOGICAL CYTOLOGY STUDY: SIGNIFICANT CHANGE UP
SPECIMEN SOURCE: SIGNIFICANT CHANGE UP

## 2020-02-12 ENCOUNTER — APPOINTMENT (OUTPATIENT)
Dept: THORACIC SURGERY | Facility: CLINIC | Age: 85
End: 2020-02-12
Payer: MEDICARE

## 2020-02-12 VITALS
WEIGHT: 148 LBS | HEART RATE: 74 BPM | SYSTOLIC BLOOD PRESSURE: 175 MMHG | TEMPERATURE: 97.7 F | DIASTOLIC BLOOD PRESSURE: 94 MMHG | BODY MASS INDEX: 27.51 KG/M2 | RESPIRATION RATE: 16 BRPM | OXYGEN SATURATION: 97 %

## 2020-02-12 PROBLEM — I10 ESSENTIAL (PRIMARY) HYPERTENSION: Chronic | Status: ACTIVE | Noted: 2020-01-28

## 2020-02-12 PROBLEM — E78.5 HYPERLIPIDEMIA, UNSPECIFIED: Chronic | Status: ACTIVE | Noted: 2020-01-28

## 2020-02-12 PROBLEM — R73.03 PREDIABETES: Chronic | Status: ACTIVE | Noted: 2020-01-28

## 2020-02-12 PROBLEM — I48.91 UNSPECIFIED ATRIAL FIBRILLATION: Chronic | Status: ACTIVE | Noted: 2020-01-28

## 2020-02-12 PROBLEM — Z87.09 PERSONAL HISTORY OF OTHER DISEASES OF THE RESPIRATORY SYSTEM: Chronic | Status: ACTIVE | Noted: 2020-01-28

## 2020-02-12 PROBLEM — C44.91 BASAL CELL CARCINOMA OF SKIN, UNSPECIFIED: Chronic | Status: ACTIVE | Noted: 2020-01-28

## 2020-02-12 PROCEDURE — 99214 OFFICE O/P EST MOD 30 MIN: CPT

## 2020-02-12 NOTE — PHYSICAL EXAM
[Auscultation Breath Sounds / Voice Sounds] : lungs were clear to auscultation bilaterally [Heart Rate And Rhythm] : heart rate was normal and rhythm regular [Heart Sounds] : normal S1 and S2 [Murmurs] : no murmurs [Heart Sounds Gallop] : no gallops [Heart Sounds Pericardial Friction Rub] : no pericardial rub [Chest Visual Inspection Thoracic Asymmetry] : no chest asymmetry [Examination Of The Chest] : the chest was normal in appearance [Diminished Respiratory Excursion] : normal chest expansion [Abdomen Soft] : soft [Abdomen Tenderness] : non-tender [Bowel Sounds] : normal bowel sounds [Abdomen Mass (___ Cm)] : no abdominal mass palpated [Abnormal Walk] : normal gait [Nail Clubbing] : no clubbing  or cyanosis of the fingernails [Musculoskeletal - Swelling] : no joint swelling seen [Motor Tone] : muscle strength and tone were normal [Skin Color & Pigmentation] : normal skin color and pigmentation [Skin Turgor] : normal skin turgor [] : no rash [Deep Tendon Reflexes (DTR)] : deep tendon reflexes were 2+ and symmetric [Oriented To Time, Place, And Person] : oriented to person, place, and time [Sensation] : the sensory exam was normal to light touch and pinprick [No Focal Deficits] : no focal deficits [Impaired Insight] : insight and judgment were intact [Affect] : the affect was normal

## 2020-02-13 NOTE — ASSESSMENT
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 Garcia.\par \par Now s/p Bronchoscopy, Lt VATS, evacuation of Lt pleural effusion. Path of pleural fluid is negative for malignant cells. \par \par PET/CT reviewed with pt. RLL nodule is active on scan and is highly suspicious for lung ca. I would like to repeat scan in one month, if this nodule is not decrease in size, I would consider a RLLobectomy. \par \par \par I personally performed the services described in the documentation, reviewed the documentation recorded by the scribe in my presence and it accurately and completely records my words and actions. \par \par I, Darby Ibarra, BEULAH, am scribing for and the presence of Dr. Ranjit Tillman the following sections, HISTORY OF PRESENT ILLNESS, PAST MEDICAL/FAMILY/SOCIAL HISTORY; REVIEW OF SYSTEMS; VITAL SIGNS; PHYSICAL EXAM; DISPOSITION.\par

## 2020-02-13 NOTE — CONSULT LETTER
[Dear  ___] : Dear  [unfilled], [Courtesy Letter:] : I had the pleasure of seeing your patient, [unfilled], in my office today. [Please see my note below.] : Please see my note below. [Sincerely,] : Sincerely, [FreeTextEntry2] : Dr. Milton Rojas ( PCP )\par Dr. Quan Garcia ( Pulm / Ref )  [FreeTextEntry3] : Ranjit Tillman MD, FACS \par , Division of Thoracic Surgery \par [facility] \par Chief, Thoracic Surgery \par [facility] \par Department of Cardiovascular & Thoracic Surgery \par  \par [facility] [employer] [facility] \par \par

## 2020-02-13 NOTE — HISTORY OF PRESENT ILLNESS
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 Garcia.\par \par She recently was not feeling well in the beginning of 12/2019 with shortness of breath and cough. She was seen at her PCP 's office and Chest X-Ray was done which revealed Pleural effusion. She was referred to Dr. Garcia where she had the Pleural effusion drained twice from the left side. Pathology was negative for malignant cells.\par \par CT scan on 12/21/2019 revealed:\par - Subsegmental atelectasis in the LLL. Ovoid density in the lingula segment abutting the fissure.\par - 4 mm groundglass nodule in the RML.\par - Left apical subpleural nodule measuring 2 mm.\par - Mild to moderate partially loculated left pleural effusion. \par \par PFT's on 12/23/2019 showed:\par FVC - 1.38 ( 76%) FEV1 - 1.06 ( 77 % ) , DLCO - 10.3 ( 62%)\par \par PET/CT on 1/8/20:\par - a 1.7 cm FDG avid nodule in RLL, SUV=5.2\par - 4 mm RML nodule and 2 mm subpleural nodule in Lt apex\par - loculated small Lt pleural effusion. SUV=4.3\par - hypermetabolic loop of small bowel in Lt lower abd, SUV=11.1\par \par Now s/p Bronchoscopy, Lt VATS, evacuation of Lt pleural effusion. Path of pleural fluid is negative for malignant cells. \par \par Pt presents today for follow up. Pt c/o post-op pain and controlled with Tylenol only. Denies SOB, cough or CP.

## 2020-03-03 LAB
CULTURE - ACID FAST SMEAR CONCENTRATED: SIGNIFICANT CHANGE UP
SPECIMEN SOURCE: SIGNIFICANT CHANGE UP
SPECIMEN SOURCE: SIGNIFICANT CHANGE UP

## 2020-03-04 LAB
FUNGUS SPEC QL CULT: SIGNIFICANT CHANGE UP
SPECIMEN SOURCE: SIGNIFICANT CHANGE UP

## 2020-03-10 ENCOUNTER — FORM ENCOUNTER (OUTPATIENT)
Age: 85
End: 2020-03-10

## 2020-03-11 ENCOUNTER — APPOINTMENT (OUTPATIENT)
Dept: CT IMAGING | Facility: IMAGING CENTER | Age: 85
End: 2020-03-11
Payer: MEDICARE

## 2020-03-11 ENCOUNTER — OUTPATIENT (OUTPATIENT)
Dept: OUTPATIENT SERVICES | Facility: HOSPITAL | Age: 85
LOS: 1 days | End: 2020-03-11
Payer: MEDICARE

## 2020-03-11 DIAGNOSIS — Z96.641 PRESENCE OF RIGHT ARTIFICIAL HIP JOINT: Chronic | ICD-10-CM

## 2020-03-11 DIAGNOSIS — J90 PLEURAL EFFUSION, NOT ELSEWHERE CLASSIFIED: ICD-10-CM

## 2020-03-11 PROCEDURE — 71250 CT THORAX DX C-: CPT | Mod: 26

## 2020-03-11 PROCEDURE — 71250 CT THORAX DX C-: CPT

## 2020-03-17 LAB — ACID FAST STN SPEC: SIGNIFICANT CHANGE UP

## 2020-04-01 ENCOUNTER — APPOINTMENT (OUTPATIENT)
Dept: THORACIC SURGERY | Facility: CLINIC | Age: 85
End: 2020-04-01

## 2020-04-03 ENCOUNTER — APPOINTMENT (OUTPATIENT)
Dept: RADIATION ONCOLOGY | Facility: CLINIC | Age: 85
End: 2020-04-03
Payer: MEDICARE

## 2020-04-03 ENCOUNTER — OUTPATIENT (OUTPATIENT)
Dept: OUTPATIENT SERVICES | Facility: HOSPITAL | Age: 85
LOS: 1 days | Discharge: HOME | End: 2020-04-03

## 2020-04-03 DIAGNOSIS — Z96.641 PRESENCE OF RIGHT ARTIFICIAL HIP JOINT: Chronic | ICD-10-CM

## 2020-04-03 PROCEDURE — 99203 OFFICE O/P NEW LOW 30 MIN: CPT | Mod: 95

## 2020-04-07 NOTE — HISTORY OF PRESENT ILLNESS
[Home] : at home, [unfilled] , at the time of the visit. [Medical Office: ([facility])___] : at ~his/her~ medical office located in [place] [Patient] : the patient [Self] : self [FreeTextEntry1] : Ms. Melissa Mak is an 87yo woman with a suspicious lung nodule presenting for evaluation of radiation therapy. She has a history of two skin cancers, one on her face, and another on her lower extremity, both of which are BERNARDO at this time. She presented with SOB in 12/2019 and was found to have a left sided pleural effusion which was drained twice by Dr. Garcia. No malignant cells detected in the drainage fluid. She had a CT chest 12/21/19 which showed 4mm nodule in the RML, 2mm subpleural apical nodule of the left lobe, and loculated effusion. She underwent pulmonary function testing with the following results: FVC - 1.38 ( 76%) FEV1 - 1.06 ( 77 % ) , DLCO - 10.3 ( 62%) and PET/CT 1/8/2020 showing previously mentioned subcentimeter nodules but also a 1.7cm RLL nodule with SUV 5.2. She underwent Left VATS 2/4/2020 to aid in effusion drainage which again did not yield malignant cells. Dr. Tillman repeated her CT scan 3/11/20 which showed persistence of the RLL nodular opacity. He referred her to Rad Onc for evaluation of SBRT since elective surgery is being postponed currently due to COVID-19 crisis.

## 2020-04-07 NOTE — REVIEW OF SYSTEMS
[SOB on Exertion] : shortness of breath during exertion [Negative] : Psychiatric [Cough: Grade 0] : Cough: Grade 0 [Dyspnea: Grade 0] : Dyspnea: Grade 0 [Hoarseness: Grade 0] : Hoarseness: Grade 0 [Hypoxia: Grade 0] : Hypoxia: Grade 0 [Pneumonitis: Grade 0] : Pneumonitis: Grade 0 [Voice Alteration: Grade 0] : Voice Alteration: Grade 0 [Shortness Of Breath] : no shortness of breath [Wheezing] : no wheezing [Cough] : no cough

## 2020-05-06 ENCOUNTER — APPOINTMENT (OUTPATIENT)
Dept: RADIATION ONCOLOGY | Facility: CLINIC | Age: 85
End: 2020-05-06
Payer: MEDICARE

## 2020-05-06 PROCEDURE — 99443: CPT

## 2020-05-11 NOTE — HISTORY OF PRESENT ILLNESS
[Home] : at home, [unfilled] , at the time of the visit. [Medical Office: ([facility])___] : at ~his/her~ medical office located in [place] [Patient] : the patient [Self] : self [FreeTextEntry1] : Ms. Melissa Mak is an 87yo woman with a suspicious lung nodule presenting for evaluation of radiation therapy. She has a history of two skin cancers, one on her face, and another on her lower extremity, both of which are BERNARDO at this time. She presented with SOB in 12/2019 and was found to have a left sided pleural effusion which was drained twice by Dr. Garcia. No malignant cells detected in the drainage fluid. She had a CT chest 12/21/19 which showed 4mm nodule in the RML, 2mm subpleural apical nodule of the left lobe, and loculated effusion. She underwent pulmonary function testing with the following results: FVC - 1.38 ( 76%) FEV1 - 1.06 ( 77 % ) , DLCO - 10.3 ( 62%) and PET/CT 1/8/2020 showing previously mentioned subcentimeter nodules but also a 1.7cm RLL nodule with SUV 5.2. She underwent Left VATS 2/4/2020 to aid in effusion drainage which again did not yield malignant cells. Dr. Tillman repeated her CT scan 3/11/20 which showed persistence of the RLL nodular opacity. He referred her to Rad Onc for evaluation of SBRT since elective surgery is being postponed currently due to COVID-19 crisis.\par \par At initial consultation, we decided that given stability in size over past several months, no older scans to establish any concerning rate of growth, and concerns of exposure to COVID when coming for radiation, particularly given her advanced age, it would be most reasonable to wait 4 weeks and reassess the environment before proceeding with SBRT.\par \par Today she has a reconsultation by phone.  No complaints of pain but hears a crackling in chest some times.  Her cough is dry and she has SOB on exertion.  Denies fever or difficulty with ADL's.

## 2020-05-11 NOTE — REVIEW OF SYSTEMS
[Constipation: Grade 0] : Constipation: Grade 0 [Diarrhea: Grade 0] : Diarrhea: Grade 0 [Dysphagia: Grade 0] : Dysphagia: Grade 0 [Cough: Grade 1 - Mild symptoms; nonprescription intervention indicated] : Cough: Grade 1 - Mild symptoms; nonprescription intervention indicated [Dyspnea: Grade 1 - Shortness of breath with moderate exertion] : Dyspnea: Grade 1 - Shortness of breath with moderate exertion [FreeTextEntry1] : dry

## 2020-06-29 ENCOUNTER — OUTPATIENT (OUTPATIENT)
Dept: OUTPATIENT SERVICES | Facility: HOSPITAL | Age: 85
LOS: 1 days | End: 2020-06-29
Payer: MEDICARE

## 2020-06-29 ENCOUNTER — APPOINTMENT (OUTPATIENT)
Dept: CT IMAGING | Facility: IMAGING CENTER | Age: 85
End: 2020-06-29
Payer: MEDICARE

## 2020-06-29 DIAGNOSIS — Z96.641 PRESENCE OF RIGHT ARTIFICIAL HIP JOINT: Chronic | ICD-10-CM

## 2020-06-29 DIAGNOSIS — R91.1 SOLITARY PULMONARY NODULE: ICD-10-CM

## 2020-06-29 PROCEDURE — 71250 CT THORAX DX C-: CPT

## 2020-06-29 PROCEDURE — 71250 CT THORAX DX C-: CPT | Mod: 26

## 2020-07-01 ENCOUNTER — APPOINTMENT (OUTPATIENT)
Dept: RADIATION ONCOLOGY | Facility: CLINIC | Age: 85
End: 2020-07-01
Payer: MEDICARE

## 2020-07-02 ENCOUNTER — APPOINTMENT (OUTPATIENT)
Dept: RADIATION ONCOLOGY | Facility: CLINIC | Age: 85
End: 2020-07-02
Payer: MEDICARE

## 2020-07-02 PROCEDURE — 99442: CPT | Mod: GC

## 2020-07-02 RX ORDER — LACTULOSE 10 G/15ML
10 SOLUTION ORAL DAILY
Qty: 1 | Refills: 0 | Status: DISCONTINUED | COMMUNITY
Start: 2020-02-07 | End: 2020-07-02

## 2020-07-14 NOTE — REVIEW OF SYSTEMS
[Fatigue] : fatigue [Cough] : cough [SOB on Exertion] : shortness of breath during exertion [Constipation] : constipation [Joint Pain] : joint pain [Negative] : Allergic/Immunologic [Fatigue: Grade 1 - Fatigue relieved by rest] : Fatigue: Grade 1 - Fatigue relieved by rest [Shortness Of Breath] : no shortness of breath [Muscle Weakness] : no muscle weakness [Gait Disturbance] : no gait disturbance [FreeTextEntry7] : chronic  [FreeTextEntry6] : dry cough  [FreeTextEntry9] : b/l legs cramps [FreeTextEntry8] : g

## 2020-07-14 NOTE — HISTORY OF PRESENT ILLNESS
[Home] : at home, [unfilled] , at the time of the visit. [Medical Office: ([facility])___] : at the medical office located in  [Verbal consent obtained from patient] : the patient, [unfilled] [FreeTextEntry1] : Ms. Melissa Medina is an 87 yo woman with a suspicious lung nodule presenting for evaluation of radiation therapy. She has a history of two skin cancers, one on her face, and another on her lower extremity, both of which are BERNARDO at this time. She presented with SOB in 12/2019 and was found to have a left sided pleural effusion which was drained twice by Dr. Garcia. No malignant cells detected in the drainage fluid. She had a CT chest 12/21/19 which showed 4 mm nodule in the RML, 2 mm subpleural apical nodule of the left lobe, and loculated effusion. She underwent pulmonary function testing with the following results: FVC - 1.38 ( 76%) FEV1 - 1.06 ( 77 % ) , DLCO - 10.3 ( 62%) and PET/CT 1/8/2020 showing previously mentioned subcentimeter nodules but also a 1.7cm RLL nodule with SUV 5.2. She underwent Left VATS 2/4/2020 to aid in effusion drainage which again did not yield malignant cells. Dr. Tillman repeated her CT scan 3/11/20 which showed persistence of the RLL nodular opacity. He referred her to Rad Onc for evaluation of SBRT since elective surgery is being postponed currently due to COVID-19 crisis.\par \par At initial consultation, we decided that given stability in size over past several months, no older scans to establish any concerning rate of growth, and concerns of exposure to COVID when coming for radiation, particularly given her advanced age, it would be most reasonable to wait 4 weeks and reassess the environment before proceeding with SBRT.\par \par Interval 7/1/20: \par CT chest 6/29/20 revealed lobulated 1.1 x 1.7 cm RLL nodule likely reflecting a primary lung neoplasm is unchanged since 12/21/19. Additional 3 mm right middle lobe pulmonary nodule is also unchanged since 2019. \par She presents for follow up. Today she reports feeling at baseline, with easily being fatigued, SOB upon exertion. Denies fever, chills, cough. Reports "gurgling" sound in chest upon waking up in the morning, without SOB or cough, which she had for few months, and  now resolved for last two weeks. Expressed being a little apprehensive about CT scan results.

## 2020-08-31 ENCOUNTER — OUTPATIENT (OUTPATIENT)
Dept: OUTPATIENT SERVICES | Facility: HOSPITAL | Age: 85
LOS: 1 days | End: 2020-08-31
Payer: MEDICARE

## 2020-08-31 ENCOUNTER — APPOINTMENT (OUTPATIENT)
Dept: CT IMAGING | Facility: IMAGING CENTER | Age: 85
End: 2020-08-31
Payer: MEDICARE

## 2020-08-31 DIAGNOSIS — Z96.641 PRESENCE OF RIGHT ARTIFICIAL HIP JOINT: Chronic | ICD-10-CM

## 2020-08-31 DIAGNOSIS — R91.1 SOLITARY PULMONARY NODULE: ICD-10-CM

## 2020-08-31 DIAGNOSIS — Z00.8 ENCOUNTER FOR OTHER GENERAL EXAMINATION: ICD-10-CM

## 2020-08-31 PROCEDURE — 71250 CT THORAX DX C-: CPT

## 2020-08-31 PROCEDURE — 71250 CT THORAX DX C-: CPT | Mod: 26

## 2020-09-02 ENCOUNTER — APPOINTMENT (OUTPATIENT)
Dept: RADIATION ONCOLOGY | Facility: CLINIC | Age: 85
End: 2020-09-02
Payer: MEDICARE

## 2020-09-02 VITALS
HEART RATE: 70 BPM | SYSTOLIC BLOOD PRESSURE: 177 MMHG | BODY MASS INDEX: 28.56 KG/M2 | TEMPERATURE: 96.8 F | OXYGEN SATURATION: 97 % | WEIGHT: 153.66 LBS | DIASTOLIC BLOOD PRESSURE: 73 MMHG | RESPIRATION RATE: 14 BRPM

## 2020-09-02 PROCEDURE — 99215 OFFICE O/P EST HI 40 MIN: CPT | Mod: GC

## 2020-09-02 RX ORDER — AMLODIPINE BESYLATE 5 MG/1
5 TABLET ORAL
Qty: 90 | Refills: 0 | Status: DISCONTINUED | COMMUNITY
Start: 2019-07-17 | End: 2020-09-02

## 2020-09-02 NOTE — VITALS
[Least Pain Intensity: 0/10] : 0/10 [Maximal Pain Intensity: 0/10] : 0/10 [70: Cares for self; unalbe to carry on normal activity or do active work.] : 70: Cares for self; unable to carry on normal activity or do active work.

## 2020-09-02 NOTE — REVIEW OF SYSTEMS
[Shortness Of Breath] : shortness of breath [Gait Disturbance] : gait disturbance [Constipation] : constipation [Difficulty Walking] : difficulty walking [Easy Bruising] : a tendency for easy bruising [Constipation: Grade 1 - Occasional or intermittent symptoms; occasional use of stool softeners, laxatives, dietary modification, or enema] : Constipation: Grade 1 - Occasional or intermittent symptoms; occasional use of stool softeners, laxatives, dietary modification, or enema [Negative] : Integumentary [Dysphagia: Grade 0] : Dysphagia: Grade 0 [Nausea: Grade 0] : Nausea: Grade 0 [Vomiting: Grade 0] : Vomiting: Grade 0 [Cough: Grade 0] : Cough: Grade 0 [Dyspnea: Grade 1 - Shortness of breath with moderate exertion] : Dyspnea: Grade 1 - Shortness of breath with moderate exertion [Hoarseness: Grade 0] : Hoarseness: Grade 0

## 2020-09-09 ENCOUNTER — NON-APPOINTMENT (OUTPATIENT)
Age: 85
End: 2020-09-09

## 2020-09-16 ENCOUNTER — RESULT REVIEW (OUTPATIENT)
Age: 85
End: 2020-09-16

## 2020-09-16 ENCOUNTER — OUTPATIENT (OUTPATIENT)
Dept: OUTPATIENT SERVICES | Facility: HOSPITAL | Age: 85
LOS: 1 days | End: 2020-09-16
Payer: MEDICARE

## 2020-09-16 ENCOUNTER — APPOINTMENT (OUTPATIENT)
Dept: NUCLEAR MEDICINE | Facility: IMAGING CENTER | Age: 85
End: 2020-09-16
Payer: MEDICARE

## 2020-09-16 DIAGNOSIS — R91.1 SOLITARY PULMONARY NODULE: ICD-10-CM

## 2020-09-16 DIAGNOSIS — Z96.641 PRESENCE OF RIGHT ARTIFICIAL HIP JOINT: Chronic | ICD-10-CM

## 2020-09-16 DIAGNOSIS — Z00.8 ENCOUNTER FOR OTHER GENERAL EXAMINATION: ICD-10-CM

## 2020-09-16 PROCEDURE — 78815 PET IMAGE W/CT SKULL-THIGH: CPT

## 2020-09-16 PROCEDURE — A9552: CPT

## 2020-09-16 PROCEDURE — 78815 PET IMAGE W/CT SKULL-THIGH: CPT | Mod: 26,PS

## 2020-09-16 NOTE — HISTORY OF PRESENT ILLNESS
[FreeTextEntry1] : Ms. Melissa Medina is an 87 yo woman with a suspicious lung nodule presenting for evaluation of radiation therapy. She has a history of two skin cancers, one on her face, and another on her lower extremity, both of which are BERNARDO at this time. She presented with SOB in 12/2019 and was found to have a left sided pleural effusion which was drained twice by Dr. Garcia. No malignant cells detected in the drainage fluid. She had a CT chest 12/21/19 which showed 4 mm nodule in the RML, 2 mm subpleural apical nodule of the left lobe, and loculated effusion. She underwent pulmonary function testing with the following results: FVC - 1.38 ( 76%) FEV1 - 1.06 ( 77 % ) , DLCO - 10.3 ( 62%) and PET/CT 1/8/2020 showing previously mentioned subcentimeter nodules but also a 1.7cm RLL nodule with SUV 5.2. She underwent Left VATS 2/4/2020 to aid in effusion drainage which again did not yield malignant cells. Dr. Tillman repeated her CT scan 3/11/20 which showed persistence of the RLL nodular opacity. He referred her to Rad Onc for evaluation of SBRT since elective surgery is being postponed currently due to COVID-19 crisis.\par \par At initial consultation, we decided that given stability in size over past several months, no older scans to establish any concerning rate of growth, and concerns of exposure to COVID when coming for radiation, particularly given her advanced age, it would be most reasonable to wait 4 weeks and reassess the environment before proceeding with SBRT.\par \par Interval 7/1/20: \par CT chest 6/29/20 revealed lobulated 1.1 x 1.7 cm RLL nodule likely reflecting a primary lung neoplasm is unchanged since 12/21/19. Additional 3 mm right middle lobe pulmonary nodule is also unchanged since 2019. \par \par \par On 8/31/20 CT chest showed no change in size of the largest nodule in the RLL.\par \par She presents for follow up. No complaints of pain noted.  Eating well.  Pt has sob but no cough at this time.

## 2020-09-22 ENCOUNTER — APPOINTMENT (OUTPATIENT)
Dept: RADIATION ONCOLOGY | Facility: CLINIC | Age: 85
End: 2020-09-22
Payer: MEDICARE

## 2020-09-22 VITALS
TEMPERATURE: 96.98 F | BODY MASS INDEX: 29.07 KG/M2 | HEIGHT: 61.5 IN | DIASTOLIC BLOOD PRESSURE: 74 MMHG | OXYGEN SATURATION: 98 % | SYSTOLIC BLOOD PRESSURE: 194 MMHG | HEART RATE: 70 BPM | WEIGHT: 155.97 LBS | RESPIRATION RATE: 15 BRPM

## 2020-09-22 PROCEDURE — 99213 OFFICE O/P EST LOW 20 MIN: CPT | Mod: GC

## 2020-09-22 NOTE — REVIEW OF SYSTEMS
[Fatigue] : fatigue [SOB on Exertion] : shortness of breath during exertion [Constipation] : constipation [Gait Disturbance] : gait disturbance [Difficulty Walking] : difficulty walking [Easy Bruising] : a tendency for easy bruising [Negative] : Integumentary [Constipation: Grade 1 - Occasional or intermittent symptoms; occasional use of stool softeners, laxatives, dietary modification, or enema] : Constipation: Grade 1 - Occasional or intermittent symptoms; occasional use of stool softeners, laxatives, dietary modification, or enema [Dysphagia: Grade 0] : Dysphagia: Grade 0 [Nausea: Grade 0] : Nausea: Grade 0 [Vomiting: Grade 0] : Vomiting: Grade 0 [Fatigue: Grade 0] : Fatigue: Grade 0 [Hematuria: Grade 0] : Hematuria: Grade 0 [Cough: Grade 0] : Cough: Grade 0 [Dyspnea: Grade 1 - Shortness of breath with moderate exertion] : Dyspnea: Grade 1 - Shortness of breath with moderate exertion [Hiccups: Grade 0] : Hiccups: Grade 0 [Hoarseness: Grade 0] : Hoarseness: Grade 0

## 2020-09-29 ENCOUNTER — APPOINTMENT (OUTPATIENT)
Dept: INTERNAL MEDICINE | Facility: CLINIC | Age: 85
End: 2020-09-29

## 2020-09-30 NOTE — HISTORY OF PRESENT ILLNESS
[FreeTextEntry1] : Ms. Melissa Medina is an 85 yo woman with a suspicious lung nodule presenting for evaluation of radiation therapy. She has a history of two skin cancers, one on her face, and another on her lower extremity, both of which are BERNARDO at this time. She presented with SOB in 12/2019 and was found to have a left sided pleural effusion which was drained twice by Dr. Garcia. No malignant cells detected in the drainage fluid. She had a CT chest 12/21/19 which showed 4 mm nodule in the RML, 2 mm subpleural apical nodule of the left lobe, and loculated effusion. She underwent pulmonary function testing with the following results: FVC - 1.38 ( 76%) FEV1 - 1.06 ( 77 % ) , DLCO - 10.3 ( 62%) and PET/CT 1/8/2020 showing previously mentioned subcentimeter nodules but also a 1.7cm RLL nodule with SUV 5.2. She underwent Left VATS 2/4/2020 to aid in effusion drainage which again did not yield malignant cells. Dr. Tillman repeated her CT scan 3/11/20 which showed persistence of the RLL nodular opacity. He referred her to Rad Onc for evaluation of SBRT since elective surgery is being postponed currently due to COVID-19 crisis.\par \par At initial consultation, we decided that given stability in size over past several months, no older scans to establish any concerning rate of growth, and concerns of exposure to COVID when coming for radiation, particularly given her advanced age, it would be most reasonable to wait 4 weeks and reassess the environment before proceeding with SBRT.\par \par On 9/16/20 She underwent PET/CT, which showed unchanged FDG avid RLL pulmonary nodule and subcentimeter nodules are too small to characterize. There was resolution of loculated left pleural effusion with minimal FDG avid pleural thickening that is nonspecific. There is also new FDG avidity in the gastric fundus/body and GE junction, possibly inflammatory. \par \par Pt here to discuss scans that were performed. no complaints of pain noted. Occasional SOB on exertion but no cough at this time.\par No complaints of pain noted.  Eating well.

## 2020-10-12 ENCOUNTER — OUTPATIENT (OUTPATIENT)
Dept: OUTPATIENT SERVICES | Facility: HOSPITAL | Age: 85
LOS: 1 days | Discharge: HOME | End: 2020-10-12

## 2020-10-12 DIAGNOSIS — C44.40 UNSPECIFIED MALIGNANT NEOPLASM OF SKIN OF SCALP AND NECK: ICD-10-CM

## 2020-10-12 DIAGNOSIS — Z96.641 PRESENCE OF RIGHT ARTIFICIAL HIP JOINT: Chronic | ICD-10-CM

## 2020-10-14 ENCOUNTER — LABORATORY RESULT (OUTPATIENT)
Age: 85
End: 2020-10-14

## 2020-10-14 ENCOUNTER — APPOINTMENT (OUTPATIENT)
Dept: HEMATOLOGY ONCOLOGY | Facility: CLINIC | Age: 85
End: 2020-10-14
Payer: MEDICARE

## 2020-10-14 PROCEDURE — 99499A: CUSTOM | Mod: NC

## 2020-11-02 NOTE — PATIENT PROFILE ADULT - NSTRANSFERBELONGINGSRESP_GEN_A_NUR
FUV 12/31/20   Last seen: 7/8/20  Last refilled: 9/22/20 #30 RF:0  Medication:zolpidem (AMBIEN) 10 MG tablet  Take one-HALF to 1 tablet by mouth at bedtime.   Last note reviewed:  continue current psychotropic medications as prescribed. Follow up in 6 months or prn.     Per pdmp, last sold 9/25/20 #30  Is the patient due for refill of this medication(s): Yes  PDMP review: Criteria met. Forwarded to Physician/ANTHONY for signature.    Message to Prescriber: Patient would like a 2 month supply if possible.      yes

## 2020-11-04 ENCOUNTER — NON-APPOINTMENT (OUTPATIENT)
Age: 85
End: 2020-11-04

## 2021-01-19 ENCOUNTER — OUTPATIENT (OUTPATIENT)
Dept: OUTPATIENT SERVICES | Facility: HOSPITAL | Age: 86
LOS: 1 days | End: 2021-01-19
Payer: MEDICARE

## 2021-01-19 ENCOUNTER — APPOINTMENT (OUTPATIENT)
Dept: CT IMAGING | Facility: IMAGING CENTER | Age: 86
End: 2021-01-19
Payer: MEDICARE

## 2021-01-19 DIAGNOSIS — Z96.641 PRESENCE OF RIGHT ARTIFICIAL HIP JOINT: Chronic | ICD-10-CM

## 2021-01-19 DIAGNOSIS — R91.1 SOLITARY PULMONARY NODULE: ICD-10-CM

## 2021-01-19 DIAGNOSIS — Z00.8 ENCOUNTER FOR OTHER GENERAL EXAMINATION: ICD-10-CM

## 2021-01-19 PROCEDURE — 71260 CT THORAX DX C+: CPT

## 2021-01-19 PROCEDURE — 82565 ASSAY OF CREATININE: CPT

## 2021-01-19 PROCEDURE — 71260 CT THORAX DX C+: CPT | Mod: 26

## 2021-01-20 ENCOUNTER — APPOINTMENT (OUTPATIENT)
Dept: RADIATION ONCOLOGY | Facility: CLINIC | Age: 86
End: 2021-01-20
Payer: MEDICARE

## 2021-01-20 VITALS
BODY MASS INDEX: 29 KG/M2 | OXYGEN SATURATION: 100 % | DIASTOLIC BLOOD PRESSURE: 88 MMHG | HEART RATE: 71 BPM | RESPIRATION RATE: 16 BRPM | SYSTOLIC BLOOD PRESSURE: 172 MMHG | TEMPERATURE: 98 F | WEIGHT: 156 LBS

## 2021-01-20 PROCEDURE — 99072 ADDL SUPL MATRL&STAF TM PHE: CPT

## 2021-01-20 PROCEDURE — 99214 OFFICE O/P EST MOD 30 MIN: CPT | Mod: GC

## 2021-01-20 NOTE — HISTORY OF PRESENT ILLNESS
[FreeTextEntry1] : Ms. Melissa Medina is an 86 yo woman with a suspicious lung nodule presenting for evaluation of radiation therapy. She has a history of two skin cancers, one on her face, and another on her lower extremity, both of which are BERNARDO at this time. \par \par She presented with SOB in 12/2019 and was found to have a left sided pleural effusion which was drained twice by Dr. Garcia. No malignant cells detected in the drainage fluid. She had a CT chest 12/21/19 which showed 4 mm nodule in the RML, 2 mm subpleural apical nodule of the left lobe, and loculated effusion. She underwent pulmonary function testing with the following results: FVC - 1.38 ( 76%) FEV1 - 1.06 ( 77 % ) , DLCO - 10.3 ( 62%) and PET/CT 1/8/2020 showing previously mentioned subcentimeter nodules but also a 1.7cm RLL nodule with SUV 5.2. She underwent Left VATS 2/4/2020 to aid in effusion drainage which again did not yield malignant cells. Dr. Tillamn repeated her CT scan 3/11/20 which showed persistence of the RLL nodular opacity. He referred her to Rad Onc for evaluation of SBRT since elective surgery was being postponed due to COVID-19 crisis.\par \par At initial consultation, we decided that given stability in size over past several months, no older scans to establish any concerning rate of growth, and concerns of exposure to COVID when coming for radiation, particularly given her advanced age, it would be most reasonable to wait 4 weeks and reassess the environment before proceeding with SBRT.\par \par On 9/16/20 She underwent PET/CT, which showed unchanged FDG avid RLL pulmonary nodule and subcentimeter nodules are too small to characterize. There was resolution of loculated left pleural effusion with minimal FDG avid pleural thickening that is nonspecific. There is also new FDG avidity in the gastric fundus/body and GE junction, possibly inflammatory. 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 \par \par She was discussed with Dr. Mancia in IR in sept for BX of the lung nodule however given the central location was thought to not be safe and not recommended.  Repeat ct 1/19/21 shows stable disease without growth since initial scan in December 2019. \par \par Pt here to discuss scan. no complaints of pain noted. Occasional SOB on exertion but no cough at this time.\par No complaints of pain noted.  Eating well.

## 2021-01-20 NOTE — PHYSICAL EXAM
[Sclera] : the sclera and conjunctiva were normal [Outer Ear] : the ears and nose were normal in appearance [Normal] : no respiratory distress, lungs were clear to auscultation bilaterally [] : no respiratory distress [Heart Rate And Rhythm] : heart rate and rhythm were normal [de-identified] : anxious

## 2021-01-20 NOTE — REASON FOR VISIT
[Other: ___] : [unfilled] [Lung Cancer] : lung cancer [Family Member] : family member [Routine Follow-Up] : routine follow-up visit for

## 2021-01-20 NOTE — REVIEW OF SYSTEMS
[Fatigue] : fatigue [Shortness Of Breath] : shortness of breath [Wheezing] : wheezing [Cough] : cough [SOB on Exertion] : shortness of breath during exertion [Negative] : Allergic/Immunologic

## 2021-02-16 ENCOUNTER — APPOINTMENT (OUTPATIENT)
Dept: PULMONOLOGY | Facility: CLINIC | Age: 86
End: 2021-02-16
Payer: MEDICARE

## 2021-02-16 VITALS
TEMPERATURE: 99 F | DIASTOLIC BLOOD PRESSURE: 77 MMHG | RESPIRATION RATE: 16 BRPM | SYSTOLIC BLOOD PRESSURE: 194 MMHG | BODY MASS INDEX: 28.89 KG/M2 | HEART RATE: 69 BPM | HEIGHT: 61.5 IN | WEIGHT: 155 LBS

## 2021-02-16 PROCEDURE — 99072 ADDL SUPL MATRL&STAF TM PHE: CPT

## 2021-02-16 PROCEDURE — 99203 OFFICE O/P NEW LOW 30 MIN: CPT

## 2021-02-16 NOTE — HISTORY OF PRESENT ILLNESS
[TextBox_4] : 87-year-old female with an abnormal CAT scan of her chest. In 2019 the patient was found to have a left pleural effusion that was undiagnosed. She underwent video-assisted thorascopic procedure with drainage had pleurodesis  but no etiology was still found. It was noted that she had a right lower lobe nodule which was irregular and turned out to be hypermetabolic on PET scan. The patient is a lifelong nonsmoker but she is a  and in the past had had exposure to both to radiation and to asbestos. She denies any significant respiratory symptoms including cough but she does have mild shortness of breath. She is hypertensive. She has been followed by thoracic surgery and radiation oncology. Decision has been made not to do a percutaneous biopsy nor did treat her for now.

## 2021-02-16 NOTE — ASSESSMENT
[FreeTextEntry1] : 87 -year-old female with an irregular pulmonary nodule in the right lower lobe which has been stable in size for a little over a year. It is highly likely this is a neoplasm. I did discuss with the patient about navigational bronchoscopy which may be able to reach the lesion and at least to determine histology and possibly mutations. I will explore this possibility with our interventional pulmonology physicians and then discussed with the patient.\par \par I reviewed the previous notes from radiation oncology, thoracic surgery and I reviewed her prior CAT scans

## 2021-02-16 NOTE — REVIEW OF SYSTEMS
[Negative] : HEENT [TextBox_44] : hypertension [TextBox_30] : as in history of present illness [TextBox_104] : skin cancers

## 2021-02-16 NOTE — PHYSICAL EXAM
[No Acute Distress] : no acute distress [Normal Appearance] : normal appearance [Normal Rate/Rhythm] : normal rate/rhythm [Normal S1, S2] : normal s1, s2 [No Resp Distress] : no resp distress [Clear to Auscultation Bilaterally] : clear to auscultation bilaterally [Normal Gait] : normal gait [No Clubbing] : no clubbing [No Edema] : no edema [No Focal Deficits] : no focal deficits [TextBox_99] : somewhat shuffling

## 2021-05-18 ENCOUNTER — APPOINTMENT (OUTPATIENT)
Dept: PULMONOLOGY | Facility: CLINIC | Age: 86
End: 2021-05-18
Payer: MEDICARE

## 2021-05-18 ENCOUNTER — APPOINTMENT (OUTPATIENT)
Dept: CT IMAGING | Facility: IMAGING CENTER | Age: 86
End: 2021-05-18
Payer: MEDICARE

## 2021-05-18 ENCOUNTER — OUTPATIENT (OUTPATIENT)
Dept: OUTPATIENT SERVICES | Facility: HOSPITAL | Age: 86
LOS: 1 days | End: 2021-05-18
Payer: MEDICARE

## 2021-05-18 VITALS
DIASTOLIC BLOOD PRESSURE: 74 MMHG | HEIGHT: 61 IN | HEART RATE: 69 BPM | RESPIRATION RATE: 15 BRPM | BODY MASS INDEX: 29.45 KG/M2 | SYSTOLIC BLOOD PRESSURE: 169 MMHG | WEIGHT: 156 LBS | TEMPERATURE: 98.2 F

## 2021-05-18 DIAGNOSIS — R91.1 SOLITARY PULMONARY NODULE: ICD-10-CM

## 2021-05-18 DIAGNOSIS — Z96.641 PRESENCE OF RIGHT ARTIFICIAL HIP JOINT: Chronic | ICD-10-CM

## 2021-05-18 DIAGNOSIS — Z00.8 ENCOUNTER FOR OTHER GENERAL EXAMINATION: ICD-10-CM

## 2021-05-18 PROCEDURE — 99072 ADDL SUPL MATRL&STAF TM PHE: CPT

## 2021-05-18 PROCEDURE — 71250 CT THORAX DX C-: CPT | Mod: 26

## 2021-05-18 PROCEDURE — 71250 CT THORAX DX C-: CPT

## 2021-05-18 PROCEDURE — 99214 OFFICE O/P EST MOD 30 MIN: CPT

## 2021-05-18 NOTE — PHYSICAL EXAM
[No Acute Distress] : no acute distress [Normal S1, S2] : normal s1, s2 [No Resp Distress] : no resp distress [No Clubbing] : no clubbing [2+ Pitting] : 2+ pitting [No Focal Deficits] : no focal deficits [Oriented x3] : oriented x3 [Normal Affect] : normal affect [TextBox_54] : 3/6 systolic ejection murmur aortic area; 2/6 systolic regurgitant murmur left apexthis; irregular rhythm [TextBox_68] : crackles right base

## 2021-05-18 NOTE — REVIEW OF SYSTEMS
[Dyspnea] : dyspnea [SOB on Exertion] : sob on exertion [Negative] : Allergy/Immunology [Cough] : no cough [Sputum] : no sputum [Pleuritic Pain] : no pleuritic pain [Wheezing] : no wheezing

## 2021-05-18 NOTE — ASSESSMENT
[FreeTextEntry1] : 87-year-old female with an abnormal CAT scan of her chest. In 2019 the patient was found to have a left pleural effusion that was undiagnosed.  CT scan from today  shows new right pleural effusion, small loculated left pleural effusion along the medial margin of the lingula/lateral wall of the ventricle.  Will start Lasix 20 mg po daily and also will need an endoscopy and bronchoscopy. \par I spoke to the patient about the right lower lobe lesion and that it was likely malignant. It appears to have an airway going to it and might be reachable by navigational bronchoscopy. The question is whether her pleural effusions are related or not to this probable neoplasm. The presence of peripheral edema and the bilateral  effusions suggest the possibility that this might be heart failure and so I have given her a trial of Lasix. I will try to arrange a navigational bronchoscopy with simultaneous endoscopy.

## 2021-05-18 NOTE — HISTORY OF PRESENT ILLNESS
[TextBox_4] : 87-year-old female with an abnormal CAT scan of her chest. In 2019 the patient was found to have a left pleural effusion that was undiagnosed. She underwent video-assisted thorascopic procedure with drainage had pleurodesis  but no etiology was still found. It was noted that she had a right lower lobe nodule which was irregular and turned out to be hypermetabolic on PET scan. The patient is a lifelong nonsmoker but she is a  and in the past had had exposure to both to radiation and to asbestos. She denies any significant respiratory symptoms including cough but she does have mild shortness of breath. She is hypertensive. She has been followed by thoracic surgery and radiation oncology. \par Repeat CT scan shows stable nodule but left and right pleural effusions.

## 2021-05-18 NOTE — END OF VISIT
[FreeTextEntry3] : I obtained the history and examined the patient and developed a plan of care  Bethel Chandler MD\par

## 2021-05-27 NOTE — PROVIDER CONTACT NOTE (OTHER) - BACKGROUND
-- DO NOT REPLY / DO NOT REPLY ALL --  -- Message is from the [facility]--    Provider paged via FreeDrive Documentation - The below message was copied and pasted from a [employer] page:    5/27/2021 8:32:49 AM  [facility] Contact [place]  ACC NURSE  Silvia Pineda MD  Secure Text  [phone] PATIENT NUMBER -------------------------------- ACC NURSE LINE (IF QUESTIONS ONLY - [phone]) FROM: [facility] RN REQUESTED DR:SILVIA PINEDA PT: KAJAL TAYLOR MRN [id number] CONTACT # [phone] [facility], REPORTING CRITICAL LAB VALUE: GLUCOSE 34 NORMAL RANGE: 65-99 COLLECTED: 5/26/21 15:10. ACL CALL BACK #[phone] OPTION #4. JR TORRES    
Called patient regarding glucose level of 34. Patient stated \"I had to fast prior to my labs getting drawn, and I could tell my sugar was low. After my labs, I ate right away.\" Patient rechecked his sugar while on the phone with RN, and sugary was 187. Patient stated that he was okay, and feeling much better since his labs yesterday. RN refilled glucose strips for patient during this encounter as well. All questions and concerns addressed during encounter.  
Pt had a VATS procedure done.

## 2021-06-01 ENCOUNTER — NON-APPOINTMENT (OUTPATIENT)
Age: 86
End: 2021-06-01

## 2021-06-14 ENCOUNTER — APPOINTMENT (OUTPATIENT)
Dept: PULMONOLOGY | Facility: CLINIC | Age: 86
End: 2021-06-14
Payer: MEDICARE

## 2021-06-14 VITALS
DIASTOLIC BLOOD PRESSURE: 87 MMHG | BODY MASS INDEX: 28.32 KG/M2 | OXYGEN SATURATION: 98 % | HEIGHT: 61 IN | HEART RATE: 87 BPM | SYSTOLIC BLOOD PRESSURE: 184 MMHG | RESPIRATION RATE: 18 BRPM | WEIGHT: 150 LBS | TEMPERATURE: 97.6 F

## 2021-06-14 PROCEDURE — 99072 ADDL SUPL MATRL&STAF TM PHE: CPT

## 2021-06-14 PROCEDURE — 76604 US EXAM CHEST: CPT

## 2021-06-14 PROCEDURE — 99205 OFFICE O/P NEW HI 60 MIN: CPT | Mod: 25

## 2021-06-14 NOTE — PHYSICAL EXAM
[No Acute Distress] : no acute distress [Normal Oropharynx] : normal oropharynx [Normal Appearance] : normal appearance [No Neck Mass] : no neck mass [Normal Rate/Rhythm] : normal rate/rhythm [Normal S1, S2] : normal s1, s2 [No Murmurs] : no murmurs [No Resp Distress] : no resp distress [Clear to Auscultation Bilaterally] : clear to auscultation bilaterally [No Abnormalities] : no abnormalities [Benign] : benign [Normal Gait] : normal gait [No Clubbing] : no clubbing [No Cyanosis] : no cyanosis [No Edema] : no edema [FROM] : FROM [No Focal Deficits] : no focal deficits [Oriented x3] : oriented x3 [Normal Affect] : normal affect [TextBox_125] : Vesicular erythematous rash on L anterior shin

## 2021-06-14 NOTE — ASSESSMENT
[FreeTextEntry1] : Patient is an 86 yo never smoker (second hand smoke exposure) F w/ an abnormal CAT scan of her chest. As per chart review, patient was found to have a L pleural effusion of unclear etiology. She underwent L VATS with drainage 2/2020 (no pleurodesis) but no etiology was still found. Patient also has a RLL nodule which was irregular and turned out to be hypermetabolic on PET scan. \par \par Patient reports she is currently taking Lasix 20mg  every other day as instructed by Dr. Chandler since since 6/1, decreased from 5 days per week. Dose was reduced as patient endorsed some adverse side effects including "flakiness". Patient endorses feeling severe LLE pain, stabbing around her knee starting on 6/9 and their a red vesicular rash appeared on 6/13. Endorses having taken shingles vaccine previously.\par \par Will plan for navigational bronchoscopy/EBUS in about 2 weeks. Advised patient to see her dermatologist for her LLE rash. Can hold Lasix for now until rash etiology is elucidated. No pleural effusion on POCUS.

## 2021-06-14 NOTE — REVIEW OF SYSTEMS
[Dyspnea] : dyspnea [A.M. Dry Mouth] : a.m. dry mouth [Edema] : edema [Rash] : rash [Negative] : Musculoskeletal [Cough] : no cough

## 2021-06-14 NOTE — CONSULT LETTER
[Dear  ___] : Dear  [unfilled], [Consult Letter:] : I had the pleasure of evaluating your patient, [unfilled]. [Please see my note below.] : Please see my note below. [Consult Closing:] : Thank you very much for allowing me to participate in the care of this patient.  If you have any questions, please do not hesitate to contact me. [Sincerely,] : Sincerely, [FreeTextEntry3] : Abad Abrams MD\par Director of Interventional Pulmonology & Bronchoscopy\par . \par Division of Pulmonary, Critical Care & Sleep Medicine\par [facility].\par \par

## 2021-06-14 NOTE — HISTORY OF PRESENT ILLNESS
[TextBox_4] : Patient is an 86 yo never smoker (second hand smoke exposure) F w/ an abnormal CAT scan of her chest. As per chart review, patient was found to have a L pleural effusion of unclear etiology. She underwent L VATS with drainage 2/2020 (no pleurodesis) but no etiology was still found. Patient also has a RLL nodule which was irregular and turned out to be hypermetabolic on PET scan. \par \par Patient reports she is currently taking Lasix 20mg  every other day as instructed by Dr. Chandler since since 6/1, decreased from 5 days per week. Dose was reduced as patient endorsed some adverse side effects including "flakiness". Patient endorses feeling severe LLE pain, stabbing around her knee starting on 6/9 and their a red vesicular rash appeared on 6/13. Endorses having taken shingles vaccine previously.\par \par Endorses ET of 1 block. Endorses diuretic initially improved SOB. Endorses intermittent dry cough and some mild LE edema. Denies fevers, chest pain, PND, orthopnea significant weight loss. \par \par Patient is a retired . Endorses past exposure to both to radiation and to asbestos for 1 summer about 50 years ago.

## 2021-06-14 NOTE — END OF VISIT
[] : Fellow [Time Spent: ___ minutes] : I have spent [unfilled] minutes of time on the encounter. [>50% of the face to face encounter time was spent on counseling and/or coordination of care for ___] : Greater than 50% of the face to face encounter time was spent on counseling and/or coordination of care for [unfilled] [FreeTextEntry3] : \par 86 y/o F with RLL nodule, referred for navigational bronchoscopy with biopsy and also assessment of the right pleural effusion. Effusion seems to have improved on ultrasound today. Agreeable to proceed with navigation bronchoscopy with biopsy of the RLL nodule. The diagnostic yield of the procedure as well as alternate diagnostic modalities were discussed with the patient and all questions were answered. The risk and benefits of bronchoscopy with transbronchial biopsy including risk of bleeding and  risk pneumothorax was discussed with the patient and they demonstrated understanding. The risk and benefits of EBUS including the risk of bleeding and risk of pneumothorax was discussed with the patient and he demonstrated understanding. Will need COVID swab and presurgical testing prior to scheduling the procedure. The office will co-ordinate testing and pre-surgical appointment.\par \par Abad Abrams MD\par Director of Interventional Pulmonology & Bronchoscopy\par . \par Division of Pulmonary, Critical Care & Sleep Medicine\par [facility].\par \par \par \par

## 2021-06-14 NOTE — PROCEDURE
[FreeTextEntry1] : Thoracic ultrasound: No pleural effusion noted - Please see separate scanned report.

## 2021-06-18 ENCOUNTER — APPOINTMENT (OUTPATIENT)
Dept: PULMONOLOGY | Facility: CLINIC | Age: 86
End: 2021-06-18
Payer: MEDICARE

## 2021-06-18 PROCEDURE — 99442: CPT

## 2021-07-06 LAB
ALBUMIN SERPL ELPH-MCNC: 4.9 G/DL
ALP BLD-CCNC: 90 U/L
ALT SERPL-CCNC: 19 U/L
ANION GAP SERPL CALC-SCNC: 12 MMOL/L
APTT BLD: 37.2 SEC
AST SERPL-CCNC: 28 U/L
BASOPHILS # BLD AUTO: 0.01 K/UL
BASOPHILS NFR BLD AUTO: 0.2 %
BILIRUB SERPL-MCNC: 0.5 MG/DL
BUN SERPL-MCNC: 22 MG/DL
CALCIUM SERPL-MCNC: 9.9 MG/DL
CHLORIDE SERPL-SCNC: 98 MMOL/L
CO2 SERPL-SCNC: 27 MMOL/L
CREAT SERPL-MCNC: 0.91 MG/DL
EOSINOPHIL # BLD AUTO: 0.11 K/UL
EOSINOPHIL NFR BLD AUTO: 1.9 %
GLUCOSE SERPL-MCNC: 97 MG/DL
HCT VFR BLD CALC: 41.9 %
HGB BLD-MCNC: 13.3 G/DL
IMM GRANULOCYTES NFR BLD AUTO: 0.2 %
INR PPP: 1.1 RATIO
LYMPHOCYTES # BLD AUTO: 0.94 K/UL
LYMPHOCYTES NFR BLD AUTO: 16.5 %
MAN DIFF?: NORMAL
MCHC RBC-ENTMCNC: 28.7 PG
MCHC RBC-ENTMCNC: 31.7 GM/DL
MCV RBC AUTO: 90.3 FL
MONOCYTES # BLD AUTO: 0.71 K/UL
MONOCYTES NFR BLD AUTO: 12.5 %
NEUTROPHILS # BLD AUTO: 3.9 K/UL
NEUTROPHILS NFR BLD AUTO: 68.7 %
PLATELET # BLD AUTO: 161 K/UL
POTASSIUM SERPL-SCNC: 4.8 MMOL/L
PROT SERPL-MCNC: 7.7 G/DL
PT BLD: 12.9 SEC
RBC # BLD: 4.64 M/UL
RBC # FLD: 14.8 %
SODIUM SERPL-SCNC: 137 MMOL/L
WBC # FLD AUTO: 5.68 K/UL

## 2021-10-04 ENCOUNTER — APPOINTMENT (OUTPATIENT)
Dept: PULMONOLOGY | Facility: CLINIC | Age: 86
End: 2021-10-04
Payer: MEDICARE

## 2021-10-04 VITALS
SYSTOLIC BLOOD PRESSURE: 180 MMHG | RESPIRATION RATE: 16 BRPM | HEIGHT: 61 IN | BODY MASS INDEX: 27.94 KG/M2 | HEART RATE: 70 BPM | TEMPERATURE: 98 F | DIASTOLIC BLOOD PRESSURE: 78 MMHG | WEIGHT: 148 LBS

## 2021-10-04 PROCEDURE — 99214 OFFICE O/P EST MOD 30 MIN: CPT

## 2021-10-04 NOTE — REASON FOR VISIT
[Follow-Up] : a follow-up visit [Abnormal CXR/ Chest CT] : an abnormal CXR/ chest CT [Shortness of Breath] : shortness of breath [Spouse] : spouse

## 2021-10-04 NOTE — END OF VISIT
[FreeTextEntry3] : total visit time was 30 minutes including a review of her CAT scans, blood work and reviewing scheduling for her bronchoscopy.

## 2021-10-04 NOTE — HISTORY OF PRESENT ILLNESS
[TextBox_4] : 87-year-old female with an abnormal CAT scan of her chest. In 2019 the patient was found to have a left pleural effusion that was undiagnosed. She underwent video-assisted thorascopic procedure with drainage had pleurodesis  but no etiology was still found. It was noted that she had a right lower lobe nodule which was irregular and turned out to be hypermetabolic on PET scan. The patient is a lifelong nonsmoker but she is a  and in the past had had exposure to both to radiation and to asbestos. She denies any significant respiratory symptoms including cough but she does have mild shortness of breath. She is hypertensive. She has been followed by thoracic surgery and radiation oncology. \par Repeat CT scan shows stable nodule but left and right pleural effusions. 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 Since I last saw her she had shingles of her left leg and she has not taken diuretics.. Despite that her weight today was quite similar to what it was in the past but she reports that several pounds over her base weight. She does suffer from hypertension and recently had an echocardiogram the results of which are not available to me.

## 2021-10-04 NOTE — PHYSICAL EXAM
[No Acute Distress] : no acute distress [Normal Appearance] : normal appearance [Normal Rate/Rhythm] : normal rate/rhythm [No Resp Distress] : no resp distress [Clear to Auscultation Bilaterally] : clear to auscultation bilaterally [TextBox_44] : positive jugular venous distention at 30° [TextBox_54] : 2/6 systolic murmur at aortic area, split P2 [TextBox_105] : postphlebitic changes left leg; trace edema

## 2021-10-04 NOTE — ASSESSMENT
[FreeTextEntry1] : . The finding of bilateral pleural effusions in the presence of significant hypertension (180/90 today) would suggest an element of hypertensive cardiomyopathy. She is seeing the cardiologist tomorrow but I suggested that she start on low dose furosemide today daily for a week and then every other day. Her last electrolytes and kidney function were okay. She is yet to be scheduled for a navigational bronchoscopy but after reviewing her echocardiogram I will attempt to set up for scheduled.

## 2021-10-12 ENCOUNTER — APPOINTMENT (OUTPATIENT)
Dept: PULMONOLOGY | Facility: CLINIC | Age: 86
End: 2021-10-12
Payer: MEDICARE

## 2021-10-12 VITALS
DIASTOLIC BLOOD PRESSURE: 71 MMHG | TEMPERATURE: 97 F | HEART RATE: 71 BPM | SYSTOLIC BLOOD PRESSURE: 183 MMHG | RESPIRATION RATE: 15 BRPM | OXYGEN SATURATION: 98 % | HEIGHT: 61 IN | BODY MASS INDEX: 27.75 KG/M2 | WEIGHT: 147 LBS

## 2021-10-12 DIAGNOSIS — R06.00 DYSPNEA, UNSPECIFIED: ICD-10-CM

## 2021-10-12 DIAGNOSIS — R94.2 ABNORMAL RESULTS OF PULMONARY FUNCTION STUDIES: ICD-10-CM

## 2021-10-12 PROCEDURE — 99214 OFFICE O/P EST MOD 30 MIN: CPT

## 2021-10-12 NOTE — ASSESSMENT
[FreeTextEntry1] : 88-year-old female with an abnormal CAT scan of her chest. In 2019 the patient was found to have a left pleural effusion that was undiagnosed. She underwent video-assisted thorascopic procedure with drainage had pleurodesis  but no etiology was still found. It was noted that she had a right lower lobe nodule which was irregular and turned out to be hypermetabolic on PET scan. \par . The patient is currently taking her Lasix every other day because of a presumptive diagnosis of congestive heart failure, diastolic dysfunction. Her left atrium is enlarged an echo and she has currently a right pleural effusion but previously had a left pleural effusion. The lesion in the right lower lobe is likely malignant. We discussed medication a bronchoscopy again. The plan is to repeat her CAT scan and then plan for navigational bronchoscopy and if positive for cancer, consider stereotactic body radiotherapy.

## 2021-10-12 NOTE — REASON FOR VISIT
[Follow-Up] : a follow-up visit [Abnormal CXR/ Chest CT] : an abnormal CXR/ chest CT [Spouse] : spouse

## 2021-10-12 NOTE — PHYSICAL EXAM
[No Acute Distress] : no acute distress [Normal Rate/Rhythm] : normal rate/rhythm [Murmur ___ / 6] : murmur [unfilled] / 6 [Normal S1, S2] : normal s1, s2 [No Resp Distress] : no resp distress [Clear to Auscultation Bilaterally] : clear to auscultation bilaterally [No Focal Deficits] : no focal deficits [Oriented x3] : oriented x3 [Normal Affect] : normal affect [TextBox_105] : post herpetic lesions to left leg; trace edema to blle

## 2021-10-12 NOTE — HISTORY OF PRESENT ILLNESS
[TextBox_4] : 88-year-old female with an abnormal CAT scan of her chest. In 2019 the patient was found to have a left pleural effusion that was undiagnosed. She underwent video-assisted thorascopic procedure with drainage had pleurodesis  but no etiology was still found. It was noted that she had a right lower lobe nodule which was irregular and turned out to be hypermetabolic on PET scan. The patient is a lifelong nonsmoker but she is a  and in the past had had exposure to both to radiation and to asbestos. She denies any significant respiratory symptoms including cough but she does have mild shortness of breath. She is hypertensive. She has been followed by thoracic surgery and radiation oncology. \par Repeat CT scan shows stable nodule but left and right pleural effusions. 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 Since I last saw her she had shingles of her left leg and she has not taken diuretics.. She has been taking furosemide 20 mg po daily for the past week and is now taking it every other day.  She feels that the shortness of breath improved somewhat while taking the furosemide.

## 2021-10-12 NOTE — END OF VISIT
Spoke with pt - pt informed of his results and recommendations per Dr. Burrell. Pt verbalized an understanding. Pt states that he is scheduled to do his CT tomorrow. CHARLES   [FreeTextEntry3] : I obtained the history and examined the patient and developed a plan of care  Bethel Chandler MD\par I spent a total time over 30 minutes with physical examination and discussing with the patient various alternatives to the approach to her tumor [Time Spent: ___ minutes] : I have spent [unfilled] minutes of time on the encounter.

## 2021-10-22 ENCOUNTER — OUTPATIENT (OUTPATIENT)
Dept: OUTPATIENT SERVICES | Facility: HOSPITAL | Age: 86
LOS: 1 days | End: 2021-10-22
Payer: MEDICARE

## 2021-10-22 ENCOUNTER — APPOINTMENT (OUTPATIENT)
Dept: CT IMAGING | Facility: IMAGING CENTER | Age: 86
End: 2021-10-22
Payer: MEDICARE

## 2021-10-22 DIAGNOSIS — R91.1 SOLITARY PULMONARY NODULE: ICD-10-CM

## 2021-10-22 DIAGNOSIS — Z96.641 PRESENCE OF RIGHT ARTIFICIAL HIP JOINT: Chronic | ICD-10-CM

## 2021-10-22 DIAGNOSIS — Z00.8 ENCOUNTER FOR OTHER GENERAL EXAMINATION: ICD-10-CM

## 2021-10-22 DIAGNOSIS — J90 PLEURAL EFFUSION, NOT ELSEWHERE CLASSIFIED: ICD-10-CM

## 2021-10-22 PROCEDURE — 71250 CT THORAX DX C-: CPT | Mod: 26

## 2021-10-22 PROCEDURE — 71250 CT THORAX DX C-: CPT

## 2021-10-26 ENCOUNTER — NON-APPOINTMENT (OUTPATIENT)
Age: 86
End: 2021-10-26

## 2022-02-09 ENCOUNTER — APPOINTMENT (OUTPATIENT)
Dept: CT IMAGING | Facility: IMAGING CENTER | Age: 87
End: 2022-02-09
Payer: COMMERCIAL

## 2022-02-09 ENCOUNTER — OUTPATIENT (OUTPATIENT)
Dept: OUTPATIENT SERVICES | Facility: HOSPITAL | Age: 87
LOS: 1 days | End: 2022-02-09
Payer: COMMERCIAL

## 2022-02-09 DIAGNOSIS — Z96.641 PRESENCE OF RIGHT ARTIFICIAL HIP JOINT: Chronic | ICD-10-CM

## 2022-02-09 DIAGNOSIS — R94.2 ABNORMAL RESULTS OF PULMONARY FUNCTION STUDIES: ICD-10-CM

## 2022-02-09 DIAGNOSIS — R91.1 SOLITARY PULMONARY NODULE: ICD-10-CM

## 2022-02-09 DIAGNOSIS — R93.89 ABNORMAL FINDINGS ON DIAGNOSTIC IMAGING OF OTHER SPECIFIED BODY STRUCTURES: ICD-10-CM

## 2022-02-09 DIAGNOSIS — J90 PLEURAL EFFUSION, NOT ELSEWHERE CLASSIFIED: ICD-10-CM

## 2022-02-09 PROCEDURE — 71250 CT THORAX DX C-: CPT

## 2022-02-09 PROCEDURE — 71250 CT THORAX DX C-: CPT | Mod: 26

## 2022-02-11 ENCOUNTER — APPOINTMENT (OUTPATIENT)
Dept: PULMONOLOGY | Facility: CLINIC | Age: 87
End: 2022-02-11
Payer: COMMERCIAL

## 2022-02-11 VITALS
DIASTOLIC BLOOD PRESSURE: 74 MMHG | WEIGHT: 146 LBS | HEIGHT: 61 IN | RESPIRATION RATE: 16 BRPM | TEMPERATURE: 97.8 F | SYSTOLIC BLOOD PRESSURE: 188 MMHG | BODY MASS INDEX: 27.56 KG/M2 | HEART RATE: 69 BPM

## 2022-02-11 PROCEDURE — 99213 OFFICE O/P EST LOW 20 MIN: CPT

## 2022-02-11 NOTE — HISTORY OF PRESENT ILLNESS
[TextBox_4] : 88-year-old female with abnormal chest CT, right lower lobe suspicious nodule that has been stable for almost 2 years. At one time she had a pleural effusion on the left and was undiagnosed and required a video-assisted thorascopic surgery and evaluation and no etiology was found. More recently she has become increasingly dyspneic and gained several pounds and noted ankle edema. Her doctors increased her O. furosemide to 20 mg every day. She denies any chest pain, cough or fever.

## 2022-02-11 NOTE — ASSESSMENT
[FreeTextEntry1] : I discussed the f current chest CT findings with the patient and her  and discussed the alternate approaches, watchful waiting, navigational bronchoscopy. The patient is in favor of watchful waiting. In addition there is certainly increasing dyspnea suggesting an increasing cardiac component to her problem. I've ordered a followup CT scan in 4 months and I will followup with her at that time.

## 2022-02-11 NOTE — PHYSICAL EXAM
[No Acute Distress] : no acute distress [Normal Appearance] : normal appearance [Normal Rate/Rhythm] : normal rate/rhythm [No Resp Distress] : no resp distress [Clear to Auscultation Bilaterally] : clear to auscultation bilaterally [Normal Gait] : normal gait [1+ Pitting] : 1+ pitting [TextBox_54] : 2/6 systolic ejection murmur aortic area

## 2022-06-01 ENCOUNTER — NON-APPOINTMENT (OUTPATIENT)
Age: 87
End: 2022-06-01

## 2022-06-08 ENCOUNTER — APPOINTMENT (OUTPATIENT)
Dept: CT IMAGING | Facility: IMAGING CENTER | Age: 87
End: 2022-06-08
Payer: COMMERCIAL

## 2022-06-08 ENCOUNTER — OUTPATIENT (OUTPATIENT)
Dept: OUTPATIENT SERVICES | Facility: HOSPITAL | Age: 87
LOS: 1 days | End: 2022-06-08
Payer: COMMERCIAL

## 2022-06-08 DIAGNOSIS — Z96.641 PRESENCE OF RIGHT ARTIFICIAL HIP JOINT: Chronic | ICD-10-CM

## 2022-06-08 DIAGNOSIS — Z00.8 ENCOUNTER FOR OTHER GENERAL EXAMINATION: ICD-10-CM

## 2022-06-08 DIAGNOSIS — R93.89 ABNORMAL FINDINGS ON DIAGNOSTIC IMAGING OF OTHER SPECIFIED BODY STRUCTURES: ICD-10-CM

## 2022-06-08 PROCEDURE — 71250 CT THORAX DX C-: CPT | Mod: 26

## 2022-06-08 PROCEDURE — 71250 CT THORAX DX C-: CPT

## 2022-07-06 ENCOUNTER — APPOINTMENT (OUTPATIENT)
Dept: PULMONOLOGY | Facility: CLINIC | Age: 87
End: 2022-07-06

## 2022-07-06 VITALS
WEIGHT: 147 LBS | RESPIRATION RATE: 17 BRPM | OXYGEN SATURATION: 95 % | TEMPERATURE: 97.7 F | DIASTOLIC BLOOD PRESSURE: 78 MMHG | HEART RATE: 71 BPM | SYSTOLIC BLOOD PRESSURE: 147 MMHG | HEIGHT: 61 IN | BODY MASS INDEX: 27.75 KG/M2

## 2022-07-06 DIAGNOSIS — J90 PLEURAL EFFUSION, NOT ELSEWHERE CLASSIFIED: ICD-10-CM

## 2022-07-06 PROCEDURE — 99213 OFFICE O/P EST LOW 20 MIN: CPT

## 2022-07-06 NOTE — REVIEW OF SYSTEMS
monitor activity - c/s PT/OT 10/6    Dispo   -- 10/7 --> apprec consultants - h/h up this am after 2 more units PRBC last pm --> repeat later this afternoon and in am - monitor for stools - cont PPI gtt and octreotide gtts per GI.  CP resolved - monitor for further sx and encouraged pt to MOVE and wear SCDs again. Cont monitor lytes, BP, h/h, stools. PT/OT to see. -- 10/6 --> apprec consultants - d/w Dr. Alta Boggs - no plans for surgery at this point -- still on PPI and octreotide gtts per GI --> cont monitor h/h as transfused again overnight -> ??need to repeat bleeding scan again - ?need tx to tertiary center - await further GI recs - cont transfuse prn and monitor lytes, renal fxn, BP, fluid status. C/s PT/OTas pt not moving much and still feels weak. -- 10/5 --> apprec GI and surgery assist --> cont on PPI, octreotide gtt's - cont trend h/h and transfuse if <7 -- Dr. Alta Boggs has ordered thyroid u/s (p) to eval nodules noted on CTA chest.  Cont monitor fluid status, monitor stools, monitor h/h closely;  Monitor BP. ?need PT/OT. Chief Complaint: fatigue, anemia    Hospital Course: Bob Nuno is a 62 y.o. female with MS, hypothyroidism, GERD, bipolar d/o, cervical cancer, gastric bypass 2002 admitted for anemia, +FOB. She was started on IV PPI gtt, IVF, GI consulted and admitted to stepdown bed for further eval/tx.   H/H down to 7.8 on 10/1 from 12.6 on 9/28  -- transfused 10 units PRBC since admission as of 10/7/2018    -- bleeding scan (+) on 10/2 --> s/p IR abdominal angiogram 10/2/18 with extravasation from gastroduodenal artery into region of duodenum thus s/p coil embolization --> repeat bleeding scan done 10/3 due to drop in hgb again and (+) for bleeding in prox small bowel --> repeat IR abd arteriogram 10/3 with repeat coil embolization of gastroduodenal artery  -- Dr. Jesus West did EGD 10/4/18 -> normal esophagus, gastric remnant small but normal with efferent and afferent loops visualized and appeared normal w/o visualized blood and anastomotic site appeared normal.  -- she was started on octreotide gtt with PPI gtt per GI 10/3  -- general surgery consulted per GI that if bleeding persists ?need surgical intervention   -- episode chest \"numbness\" 10/6 pm - EKG, trop (-). Resolved 10/7 am.        Subjective:   -- 10/7/2018  --> pt with episode of chest \"numbness\" and bilateral arm \"numbness\" after got IV Lasix last p.m. symptoms have since resolved. No associated shortness of breath. Still feels weak but is optimistic that her hemoglobin is up to 8.9 today. Hasn't been moving. Not wearing SCDs. Explained importance. Denies any abdominal pain. Per RN overnight patient did have a \"smear\" of stool which was black. Eating well. No nausea or vomiting. Denies any shortness of breath.        Medications:  Reviewed    Infusion Medications    sodium chloride 20 mL/hr at 10/04/18 1806    pantoprozole (PROTONIX) infusion 8 mg/hr (10/06/18 2208)    octreotide (SANDOSTATIN) infusion 50 mcg/hr (10/07/18 0700)     Scheduled Medications    lidocaine 1 % injection  5 mL Intradermal Once    calcium replacement protocol   Other RX Placeholder    sodium chloride flush  10 mL Intravenous 2 times per day    dimethyl fumarate  240 mg Oral BID    lamoTRIgine  200 mg Oral Daily    levothyroxine  125 mcg Oral Daily    vilazodone HCl  40 mg Oral Daily    lithium  600 mg Oral Nightly    lithium  300 mg Oral Daily     PRN Meds: sodium chloride flush, magnesium hydroxide, ondansetron, acetaminophen, nitroGLYCERIN      Intake/Output Summary (Last 24 hours) at 10/07/18 0805  Last data filed at 10/07/18 0315   Gross per 24 hour   Intake          8103.57 ml   Output             3850 ml   Net          4253.57 ml       Diet:  DIET GENERAL;    Exam:  /61   Pulse 57   Temp 98.6 °F (37 °C) (Oral)   Resp 16   Ht 4' 10\" (1.473 m)   Wt 116 lb 12.8 oz (53 kg)   SpO2 97%   BMI 24.41 kg/m² RBCUA 0-2 09/28/2018    BLOODU NEGATIVE 09/28/2018    SPECGRAV >1.030 05/13/2016    GLUCOSEU NEGATIVE 09/28/2018       Radiology:  US HEAD NECK SOFT TISSUE THYROID   Final Result   Multiple nodules bilaterally. Based on the American Thyroid Association criteria, they all have low suspicion for malignancy. **This report has been created using voice recognition software. It may contain minor errors which are inherent in voice recognition technology. **      Final report electronically signed by Dr. Laren Rinne on 10/5/2018 3:30 PM      IR ABDOMINAL AORTOBIFEMORAL CATHETER SERIALOGRAM   Final Result   Successful, uncomplicated repeat coil embolization of the gastroduodenal artery. Final report electronically signed by Dr Rachel Ceja on 10/4/2018 12:53 PM      NM GI BLOOD LOSS   Final Result   1. Findings suspicious for active GI bleed within the proximal small bowel as described above. **This report has been created using voice recognition software. It may contain minor errors which are inherent in voice recognition technology. **      Final report electronically signed by Dr. John Oneal on 10/3/2018 7:19 PM      IR ANGIOGRAM VISCERAL GASTRIC   Final Result   Successful, uncomplicated coil embolization of the gastroduodenal artery which demonstrated active extravasation on preembolization imaging. **This report has been created using voice recognition software. It may contain minor errors which are inherent in voice recognition technology. **                        Final report electronically signed by Dr Rachel Ceja on 10/2/2018 6:00 PM      NM GI BLOOD LOSS   Final Result    IMPRESSION:   1. Positive for GI bleed within the small bowel as described above. These results were communicated directly with Priscila Pryor RN  on 10/2/2018 1:39 PM,  [ ]      **This report has been created using voice recognition software.  It may contain minor errors which change in the 11 x 13 mm ovoid hyperdense lesion in the anterior mid left kidney, probably a hemorrhagic or proteinaceous debris-containing cyst although cannot exclude a solid mass. Recommend correlation with renal    ultrasound. . No hydroureteronephrosis. No renal or ureteral calculi. Bladder: Unremarkable. No wall thickening, obvious mass, or calculi. Reproductive: The uterus is absent consistent with hysterectomy. The adnexal regions are unremarkable. Stomach, small bowel, colon:   The stomach and duodenum are grossly normal.   There is moderate stool in the colon. Small bowel and colon are otherwise grossly normal.    No bowel wall thickening or evidence of bowel obstruction. Appendix: The appendix is absent and there are staples adjacent to the wall of the cecum consistent with prior appendectomy. Omentum and mesentery: Unremarkable. Peritoneum:  There is no ascites, abscess, adenopathy, or mass. No pneumoperitoneum. Abdominal and pelvic body wall soft tissues: Unremarkable. Musculoskeletal:  There is a subcentimeter sclerotic probable bone island in the left femoral head. There are degenerative changes of the lumbar spine. Aorta and iliac arteries:   No evidence of an aortic aneurysm or dissection. There is no hemodynamically significant stenosis or occlusion in the common, internal, or external iliac arteries. Mesenteric Arteries: There is no hemodynamically significant stenosis in the celiac artery, SMA, or YARELIS. Renal Arteries: There are 2 right renal arteries and a single left renal artery. There is no hemodynamically significant stenosis. IMPRESSION:       No abdominal aortic aneurysm or dissection. No acute inflammatory/infectious process in the abdomen or pelvis. No evidence of bowel obstruction. Stable 11 x 13 mm hyperdense lesion in the mid left kidney, recommend ultrasound correlation. Moderate stool in the colon. Unremarkable      Pancreas: Unremarkable. No mass or ductal dilatation. Adrenal glands: Unremarkable      Kidneys and ureters: There is no change in the 11 x 13 mm ovoid hyperdense lesion in the anterior mid left kidney, probably a hemorrhagic or proteinaceous debris-containing cyst although cannot exclude a solid mass. Recommend correlation with renal    ultrasound. . No hydroureteronephrosis. No renal or ureteral calculi. Bladder: Unremarkable. No wall thickening, obvious mass, or calculi. Reproductive: The uterus is absent consistent with hysterectomy. The adnexal regions are unremarkable. Stomach, small bowel, colon:   The stomach and duodenum are grossly normal.   There is moderate stool in the colon. Small bowel and colon are otherwise grossly normal.    No bowel wall thickening or evidence of bowel obstruction. Appendix: The appendix is absent and there are staples adjacent to the wall of the cecum consistent with prior appendectomy. Omentum and mesentery: Unremarkable. Peritoneum:  There is no ascites, abscess, adenopathy, or mass. No pneumoperitoneum. Abdominal and pelvic body wall soft tissues: Unremarkable. Musculoskeletal:  There is a subcentimeter sclerotic probable bone island in the left femoral head. There are degenerative changes of the lumbar spine. Aorta and iliac arteries:   No evidence of an aortic aneurysm or dissection. There is no hemodynamically significant stenosis or occlusion in the common, internal, or external iliac arteries. Mesenteric Arteries: There is no hemodynamically significant stenosis in the celiac artery, SMA, or YARELIS. Renal Arteries: There are 2 right renal arteries and a single left renal artery. There is no hemodynamically significant stenosis. IMPRESSION:       No abdominal aortic aneurysm or dissection. No acute inflammatory/infectious process in the abdomen or pelvis.    No evidence of bowel [Negative] : Cardiovascular

## 2022-07-06 NOTE — HISTORY OF PRESENT ILLNESS
[TextBox_4] : 88 -year-old female with an abnormal chest CT. A little more than 2 years ago an irregular nodule was noted on her chest CT. He is has been followed ever since and it remained stable but suspicious. The patient currently denies any chest pain or cough but admits to dyspnea on exertion. She also admits that she does not do much physically. A recent CT chest in June showed the nodule to be persistent but unchanged. In addition there was less left pleural effusion. The patient is on a diuretic.

## 2022-07-06 NOTE — PHYSICAL EXAM
[No Acute Distress] : no acute distress [Normal Appearance] : normal appearance [Normal Rate/Rhythm] : normal rate/rhythm [No Resp Distress] : no resp distress [Clear to Auscultation Bilaterally] : clear to auscultation bilaterally [1+ Pitting] : 1+ pitting [No Focal Deficits] : no focal deficits [TextBox_54] : 2/6 systolic ejection murmur aortic area

## 2022-07-06 NOTE — ASSESSMENT
[FreeTextEntry1] : I reviewed her CAT scans and blood nodule is stable. There is less left pleural effusion. We discussed her physical activity need to increase it. She will have a followup CT in 6 months.

## 2022-12-05 ENCOUNTER — APPOINTMENT (OUTPATIENT)
Dept: CT IMAGING | Facility: IMAGING CENTER | Age: 87
End: 2022-12-05

## 2022-12-05 ENCOUNTER — OUTPATIENT (OUTPATIENT)
Dept: OUTPATIENT SERVICES | Facility: HOSPITAL | Age: 87
LOS: 1 days | End: 2022-12-05
Payer: COMMERCIAL

## 2022-12-05 DIAGNOSIS — R91.1 SOLITARY PULMONARY NODULE: ICD-10-CM

## 2022-12-05 DIAGNOSIS — Z96.641 PRESENCE OF RIGHT ARTIFICIAL HIP JOINT: Chronic | ICD-10-CM

## 2022-12-05 PROCEDURE — 71250 CT THORAX DX C-: CPT

## 2022-12-05 PROCEDURE — 71250 CT THORAX DX C-: CPT | Mod: 26

## 2022-12-15 ENCOUNTER — APPOINTMENT (OUTPATIENT)
Dept: PULMONOLOGY | Facility: CLINIC | Age: 87
End: 2022-12-15

## 2022-12-15 VITALS
HEIGHT: 61 IN | TEMPERATURE: 97.4 F | RESPIRATION RATE: 18 BRPM | HEART RATE: 70 BPM | WEIGHT: 147 LBS | BODY MASS INDEX: 27.75 KG/M2 | DIASTOLIC BLOOD PRESSURE: 63 MMHG | SYSTOLIC BLOOD PRESSURE: 184 MMHG | OXYGEN SATURATION: 97 %

## 2022-12-15 PROCEDURE — 99213 OFFICE O/P EST LOW 20 MIN: CPT

## 2022-12-15 NOTE — ASSESSMENT
[FreeTextEntry1] : The patient's current CAT scan shows a stable irregular pulmonary nodule, 1.7 x 1.4 CM when it was 1.6 x 1.1 CM in  20/20 But stable in size since June of 2022. The plan is to follow her up in 6 months with a repeat CAT scan. For the time being I have no plans to pursue diagnostic testing.

## 2022-12-15 NOTE — PHYSICAL EXAM
[No Acute Distress] : no acute distress [Normal Appearance] : normal appearance [Normal Rate/Rhythm] : normal rate/rhythm [No Resp Distress] : no resp distress [Clear to Auscultation Bilaterally] : clear to auscultation bilaterally [Normal Gait] : normal gait [No Edema] : no edema [TextBox_54] : 2/6 systolic ejection murmur aortic area [TextBox_99] : Uses a cane

## 2022-12-15 NOTE — HISTORY OF PRESENT ILLNESS
[TextBox_4] : 89  -year-old female with an abnormal CAT scan. The patient has had a known pulmonary nodule In her right lung since 2019. It has slowly grown in size below the last several years it has been stable. The patient denies any Cough or chest pain but does admit to dyspnea on exertion. She does not do much physically. In the past she had been noted to have a small pleural effusion that seems to have responded to low dose of furosemide. She does have hypertension.She is a lifelong nonsmoker without occupational exposures

## 2023-06-07 ENCOUNTER — OUTPATIENT (OUTPATIENT)
Dept: OUTPATIENT SERVICES | Facility: HOSPITAL | Age: 88
LOS: 1 days | End: 2023-06-07
Payer: MEDICARE

## 2023-06-07 ENCOUNTER — APPOINTMENT (OUTPATIENT)
Dept: CT IMAGING | Facility: IMAGING CENTER | Age: 88
End: 2023-06-07
Payer: MEDICARE

## 2023-06-07 DIAGNOSIS — Z96.641 PRESENCE OF RIGHT ARTIFICIAL HIP JOINT: Chronic | ICD-10-CM

## 2023-06-07 DIAGNOSIS — R91.1 SOLITARY PULMONARY NODULE: ICD-10-CM

## 2023-06-07 PROCEDURE — 71250 CT THORAX DX C-: CPT

## 2023-06-07 PROCEDURE — 71250 CT THORAX DX C-: CPT | Mod: 26

## 2023-06-13 ENCOUNTER — APPOINTMENT (OUTPATIENT)
Dept: PULMONOLOGY | Facility: CLINIC | Age: 88
End: 2023-06-13
Payer: MEDICARE

## 2023-06-13 VITALS
HEIGHT: 61 IN | OXYGEN SATURATION: 97 % | HEART RATE: 88 BPM | SYSTOLIC BLOOD PRESSURE: 192 MMHG | BODY MASS INDEX: 27.75 KG/M2 | DIASTOLIC BLOOD PRESSURE: 72 MMHG | TEMPERATURE: 97.3 F | RESPIRATION RATE: 16 BRPM | WEIGHT: 147 LBS

## 2023-06-13 PROCEDURE — 99214 OFFICE O/P EST MOD 30 MIN: CPT

## 2023-06-13 NOTE — PHYSICAL EXAM
[No Acute Distress] : no acute distress [Normal Appearance] : normal appearance [No Neck Mass] : no neck mass [Normal Rate/Rhythm] : normal rate/rhythm [Normal S1, S2] : normal s1, s2 [No Resp Distress] : no resp distress [Clear to Auscultation Bilaterally] : clear to auscultation bilaterally [Normal Gait] : normal gait [No Edema] : no edema [Oriented x3] : oriented x3 [TextBox_80] : Tenderness along the right anterior axillary line [TextBox_105] : Swelling right knee

## 2023-06-13 NOTE — REVIEW OF SYSTEMS
[Pleuritic Pain] : pleuritic pain [Arthralgias] : arthralgias [Negative] : Gastrointestinal [TextBox_44] : Hypertension

## 2023-06-13 NOTE — ASSESSMENT
[FreeTextEntry1] : The pulmonary nodule appears to be about the same size and same configuration. If the reading on her CAT scan is different I will call the patient. She may have fractured a rib. The pain is less than several days ago and seems to be less each day. Her right knee was more swollen and it is less swollen since she applied ice. She will followup with me in 6 months with a followup CT scan

## 2023-06-13 NOTE — HISTORY OF PRESENT ILLNESS
[TextBox_4] : 89 -year-old female Was an abnormal chest CT.. Since 2019 she has been known to have an irregular 1. 5 cm pulmonary nodule that has been suspicious for lung cancer. However has been stable in size over the last 3 years she had a repeat CAT scan very recently which has not yet been read. However the nodule appears to be quite similar in size. Prior to having a CAT scan she fell onto her right side and injured her right knee and her right chest and currently has tenderness in the right anterior axillary line at approximately the fourth or fifth rib.

## 2023-06-13 NOTE — END OF VISIT
[FreeTextEntry3] : I spent a total of 30 minutes in this patient contact including face-to-face time, The CT reviewed with her and her  in documentation

## 2023-08-02 ENCOUNTER — LABORATORY RESULT (OUTPATIENT)
Age: 88
End: 2023-08-02

## 2023-08-02 ENCOUNTER — APPOINTMENT (OUTPATIENT)
Dept: PULMONOLOGY | Facility: CLINIC | Age: 88
End: 2023-08-02
Payer: MEDICARE

## 2023-08-02 VITALS
DIASTOLIC BLOOD PRESSURE: 93 MMHG | HEIGHT: 61 IN | RESPIRATION RATE: 15 BRPM | TEMPERATURE: 97.4 F | WEIGHT: 142 LBS | SYSTOLIC BLOOD PRESSURE: 189 MMHG | OXYGEN SATURATION: 95 % | BODY MASS INDEX: 26.81 KG/M2 | HEART RATE: 85 BPM

## 2023-08-02 DIAGNOSIS — R50.9 FEVER, UNSPECIFIED: ICD-10-CM

## 2023-08-02 PROCEDURE — 99214 OFFICE O/P EST MOD 30 MIN: CPT

## 2023-08-02 NOTE — PHYSICAL EXAM
[No Acute Distress] : no acute distress [Normal Appearance] : normal appearance [No Neck Mass] : no neck mass [Normal Rate/Rhythm] : normal rate/rhythm [No Resp Distress] : no resp distress [Clear to Auscultation Bilaterally] : clear to auscultation bilaterally [Benign] : benign [Not Tender] : not tender [No Clubbing] : no clubbing [No Edema] : no edema [TextBox_11] : Mucosa dry [TextBox_54] : 2/6 systolic ejection murmur aortic area [TextBox_99] : Uses cane

## 2023-08-02 NOTE — END OF VISIT
[FreeTextEntry3] : I spent a total of 30 minutes on this patient contact including face-to-face time, review of her prior laboratory work, imaging and documentation

## 2023-08-02 NOTE — ASSESSMENT
[FreeTextEntry1] : There is no obvious source for the fever.  Blood work was done including blood cultures sed rate C-reactive protein metabolic profile urinalysis and culture.  I asked the patient to monitor her temperatures at least twice a day for the foreseeable future.  Once I see the report from the chest x-ray I will decide about a CT scan of her chest.  I encouraged her to drink more fluids and to try to eat more

## 2023-08-02 NOTE — HISTORY OF PRESENT ILLNESS
[TextBox_4] : 89-year-old female reporting intermittent fevers over the last 2 weeks.  About 2 weeks ago she awoke from sleep with sweating and a fever of 102.  She had a brief episode of diarrhea.  Over the last 2 weeks the fevers have been occurring late afternoon about every other day.  She reported to her primary doc who did a chest x-ray and blood work.  Her white count was normal as well as the differential was normal.  She was mildly anemic and mildly hyponatremic.  Her fluid intake is to been diminished and she has a diminished appetite also.  She denies any respiratory or urinary symptoms and she denies any further GI symptoms.  She denies any chest or abdominal pain.  She and her  have not traveled anywhere in the last couple of months.  She tested herself for COVID and that was negative.

## 2023-08-03 LAB
ALBUMIN SERPL ELPH-MCNC: 3.8 G/DL
ALP BLD-CCNC: 152 U/L
ALT SERPL-CCNC: 36 U/L
ANION GAP SERPL CALC-SCNC: 16 MMOL/L
APPEARANCE: CLEAR
AST SERPL-CCNC: 37 U/L
BILIRUB SERPL-MCNC: 0.5 MG/DL
BILIRUBIN URINE: NEGATIVE
BLOOD URINE: NEGATIVE
BUN SERPL-MCNC: 23 MG/DL
CALCIUM SERPL-MCNC: 9.1 MG/DL
CHLORIDE SERPL-SCNC: 93 MMOL/L
CO2 SERPL-SCNC: 21 MMOL/L
COLOR: YELLOW
CREAT SERPL-MCNC: 0.94 MG/DL
CRP SERPL-MCNC: 152 MG/L
EGFR: 58 ML/MIN/1.73M2
ERYTHROCYTE [SEDIMENTATION RATE] IN BLOOD BY WESTERGREN METHOD: 108 MM/HR
GLUCOSE QUALITATIVE U: NEGATIVE MG/DL
KETONES URINE: NEGATIVE MG/DL
LEUKOCYTE ESTERASE URINE: ABNORMAL
NITRITE URINE: NEGATIVE
PH URINE: 6
POTASSIUM SERPL-SCNC: 5 MMOL/L
PROT SERPL-MCNC: 6.9 G/DL
PROTEIN URINE: NORMAL MG/DL
SODIUM SERPL-SCNC: 130 MMOL/L
SPECIFIC GRAVITY URINE: 1.01
UROBILINOGEN URINE: 1 MG/DL

## 2023-08-04 ENCOUNTER — APPOINTMENT (OUTPATIENT)
Dept: CT IMAGING | Facility: IMAGING CENTER | Age: 88
End: 2023-08-04
Payer: MEDICARE

## 2023-08-04 ENCOUNTER — OUTPATIENT (OUTPATIENT)
Dept: OUTPATIENT SERVICES | Facility: HOSPITAL | Age: 88
LOS: 1 days | End: 2023-08-04
Payer: MEDICARE

## 2023-08-04 DIAGNOSIS — Z96.641 PRESENCE OF RIGHT ARTIFICIAL HIP JOINT: Chronic | ICD-10-CM

## 2023-08-04 DIAGNOSIS — R50.9 FEVER, UNSPECIFIED: ICD-10-CM

## 2023-08-04 DIAGNOSIS — Z00.8 ENCOUNTER FOR OTHER GENERAL EXAMINATION: ICD-10-CM

## 2023-08-04 PROCEDURE — 71250 CT THORAX DX C-: CPT

## 2023-08-04 PROCEDURE — 71250 CT THORAX DX C-: CPT | Mod: 26

## 2023-08-07 ENCOUNTER — RESULT REVIEW (OUTPATIENT)
Age: 88
End: 2023-08-07

## 2023-08-07 ENCOUNTER — INPATIENT (INPATIENT)
Facility: HOSPITAL | Age: 88
LOS: 2 days | Discharge: HOME HEALTH | End: 2023-08-10
Attending: INTERNAL MEDICINE | Admitting: INTERNAL MEDICINE
Payer: MEDICARE

## 2023-08-07 VITALS
OXYGEN SATURATION: 97 % | DIASTOLIC BLOOD PRESSURE: 77 MMHG | RESPIRATION RATE: 20 BRPM | TEMPERATURE: 99 F | SYSTOLIC BLOOD PRESSURE: 177 MMHG | HEART RATE: 77 BPM

## 2023-08-07 DIAGNOSIS — R06.09 OTHER FORMS OF DYSPNEA: ICD-10-CM

## 2023-08-07 DIAGNOSIS — Z96.641 PRESENCE OF RIGHT ARTIFICIAL HIP JOINT: Chronic | ICD-10-CM

## 2023-08-07 DIAGNOSIS — R50.9 FEVER, UNSPECIFIED: ICD-10-CM

## 2023-08-07 DIAGNOSIS — I48.20 CHRONIC ATRIAL FIBRILLATION, UNSPECIFIED: ICD-10-CM

## 2023-08-07 DIAGNOSIS — I10 ESSENTIAL (PRIMARY) HYPERTENSION: ICD-10-CM

## 2023-08-07 DIAGNOSIS — J90 PLEURAL EFFUSION, NOT ELSEWHERE CLASSIFIED: ICD-10-CM

## 2023-08-07 DIAGNOSIS — Z29.9 ENCOUNTER FOR PROPHYLACTIC MEASURES, UNSPECIFIED: ICD-10-CM

## 2023-08-07 DIAGNOSIS — D50.9 IRON DEFICIENCY ANEMIA, UNSPECIFIED: ICD-10-CM

## 2023-08-07 DIAGNOSIS — D64.9 ANEMIA, UNSPECIFIED: ICD-10-CM

## 2023-08-07 DIAGNOSIS — E87.1 HYPO-OSMOLALITY AND HYPONATREMIA: ICD-10-CM

## 2023-08-07 LAB
ALBUMIN FLD-MCNC: 1.9 G/DL — SIGNIFICANT CHANGE UP
ALBUMIN SERPL ELPH-MCNC: 3.5 G/DL — SIGNIFICANT CHANGE UP (ref 3.3–5)
ALP SERPL-CCNC: 147 U/L — HIGH (ref 40–120)
ALT FLD-CCNC: 33 U/L — SIGNIFICANT CHANGE UP (ref 4–33)
ANION GAP SERPL CALC-SCNC: 13 MMOL/L — SIGNIFICANT CHANGE UP (ref 7–14)
APPEARANCE UR: CLEAR — SIGNIFICANT CHANGE UP
APPEARANCE UR: CLEAR — SIGNIFICANT CHANGE UP
APTT BLD: 28.3 SEC — SIGNIFICANT CHANGE UP (ref 24.5–35.6)
AST SERPL-CCNC: 37 U/L — HIGH (ref 4–32)
B PERT DNA SPEC QL NAA+PROBE: SIGNIFICANT CHANGE UP
B PERT IGG+IGM PNL SER: CLEAR — SIGNIFICANT CHANGE UP
B PERT+PARAPERT DNA PNL SPEC NAA+PROBE: SIGNIFICANT CHANGE UP
BACTERIA # UR AUTO: NEGATIVE /HPF — SIGNIFICANT CHANGE UP
BASOPHILS # BLD AUTO: 0.04 K/UL — SIGNIFICANT CHANGE UP (ref 0–0.2)
BASOPHILS NFR BLD AUTO: 0.5 % — SIGNIFICANT CHANGE UP (ref 0–2)
BILIRUB SERPL-MCNC: 0.4 MG/DL — SIGNIFICANT CHANGE UP (ref 0.2–1.2)
BILIRUB UR-MCNC: NEGATIVE — SIGNIFICANT CHANGE UP
BILIRUB UR-MCNC: NEGATIVE — SIGNIFICANT CHANGE UP
BORDETELLA PARAPERTUSSIS (RAPRVP): SIGNIFICANT CHANGE UP
BUN SERPL-MCNC: 17 MG/DL — SIGNIFICANT CHANGE UP (ref 7–23)
C PNEUM DNA SPEC QL NAA+PROBE: SIGNIFICANT CHANGE UP
CALCIUM SERPL-MCNC: 9.1 MG/DL — SIGNIFICANT CHANGE UP (ref 8.4–10.5)
CAST: 0 /LPF — SIGNIFICANT CHANGE UP (ref 0–4)
CHLORIDE SERPL-SCNC: 92 MMOL/L — LOW (ref 98–107)
CO2 SERPL-SCNC: 23 MMOL/L — SIGNIFICANT CHANGE UP (ref 22–31)
COLOR FLD: YELLOW
COLOR SPEC: YELLOW — SIGNIFICANT CHANGE UP
COLOR SPEC: YELLOW — SIGNIFICANT CHANGE UP
CREAT ?TM UR-MCNC: 6 MG/DL — SIGNIFICANT CHANGE UP
CREAT SERPL-MCNC: 0.91 MG/DL — SIGNIFICANT CHANGE UP (ref 0.5–1.3)
DIFF PNL FLD: NEGATIVE — SIGNIFICANT CHANGE UP
DIFF PNL FLD: NEGATIVE — SIGNIFICANT CHANGE UP
EGFR: 60 ML/MIN/1.73M2 — SIGNIFICANT CHANGE UP
EOSINOPHIL # BLD AUTO: 0.07 K/UL — SIGNIFICANT CHANGE UP (ref 0–0.5)
EOSINOPHIL # FLD: 0 % — SIGNIFICANT CHANGE UP
EOSINOPHIL NFR BLD AUTO: 0.8 % — SIGNIFICANT CHANGE UP (ref 0–6)
FLUAV SUBTYP SPEC NAA+PROBE: SIGNIFICANT CHANGE UP
FLUBV RNA SPEC QL NAA+PROBE: SIGNIFICANT CHANGE UP
FLUID INTAKE SUBSTANCE CLASS: SIGNIFICANT CHANGE UP
FOLATE+VIT B12 SERBLD-IMP: 0 % — SIGNIFICANT CHANGE UP
GLUCOSE FLD-MCNC: 152 MG/DL — SIGNIFICANT CHANGE UP
GLUCOSE SERPL-MCNC: 118 MG/DL — HIGH (ref 70–99)
GLUCOSE UR QL: NEGATIVE MG/DL — SIGNIFICANT CHANGE UP
GLUCOSE UR QL: NEGATIVE MG/DL — SIGNIFICANT CHANGE UP
HADV DNA SPEC QL NAA+PROBE: SIGNIFICANT CHANGE UP
HCOV 229E RNA SPEC QL NAA+PROBE: SIGNIFICANT CHANGE UP
HCOV HKU1 RNA SPEC QL NAA+PROBE: SIGNIFICANT CHANGE UP
HCOV NL63 RNA SPEC QL NAA+PROBE: SIGNIFICANT CHANGE UP
HCOV OC43 RNA SPEC QL NAA+PROBE: SIGNIFICANT CHANGE UP
HCT VFR BLD CALC: 29.1 % — LOW (ref 34.5–45)
HGB BLD-MCNC: 9.3 G/DL — LOW (ref 11.5–15.5)
HMPV RNA SPEC QL NAA+PROBE: SIGNIFICANT CHANGE UP
HPIV1 RNA SPEC QL NAA+PROBE: SIGNIFICANT CHANGE UP
HPIV2 RNA SPEC QL NAA+PROBE: SIGNIFICANT CHANGE UP
HPIV3 RNA SPEC QL NAA+PROBE: SIGNIFICANT CHANGE UP
HPIV4 RNA SPEC QL NAA+PROBE: SIGNIFICANT CHANGE UP
IANC: 6.95 K/UL — SIGNIFICANT CHANGE UP (ref 1.8–7.4)
IMM GRANULOCYTES NFR BLD AUTO: 0.6 % — SIGNIFICANT CHANGE UP (ref 0–0.9)
INR BLD: 1.34 RATIO — HIGH (ref 0.85–1.18)
KETONES UR-MCNC: NEGATIVE MG/DL — SIGNIFICANT CHANGE UP
KETONES UR-MCNC: NEGATIVE MG/DL — SIGNIFICANT CHANGE UP
LDH SERPL L TO P-CCNC: 110 U/L — SIGNIFICANT CHANGE UP
LEUKOCYTE ESTERASE UR-ACNC: NEGATIVE — SIGNIFICANT CHANGE UP
LEUKOCYTE ESTERASE UR-ACNC: NEGATIVE — SIGNIFICANT CHANGE UP
LYMPHOCYTES # BLD AUTO: 0.7 K/UL — LOW (ref 1–3.3)
LYMPHOCYTES # BLD AUTO: 8 % — LOW (ref 13–44)
LYMPHOCYTES # FLD: 28 % — SIGNIFICANT CHANGE UP
M PNEUMO DNA SPEC QL NAA+PROBE: SIGNIFICANT CHANGE UP
MCHC RBC-ENTMCNC: 22.9 PG — LOW (ref 27–34)
MCHC RBC-ENTMCNC: 32 GM/DL — SIGNIFICANT CHANGE UP (ref 32–36)
MCV RBC AUTO: 71.7 FL — LOW (ref 80–100)
MESOTHL CELL # FLD: 9 % — SIGNIFICANT CHANGE UP
MONOCYTES # BLD AUTO: 0.95 K/UL — HIGH (ref 0–0.9)
MONOCYTES NFR BLD AUTO: 10.8 % — SIGNIFICANT CHANGE UP (ref 2–14)
MONOS+MACROS # FLD: 11 % — SIGNIFICANT CHANGE UP
NEUTROPHILS # BLD AUTO: 6.95 K/UL — SIGNIFICANT CHANGE UP (ref 1.8–7.4)
NEUTROPHILS NFR BLD AUTO: 79.3 % — HIGH (ref 43–77)
NEUTROPHILS-BODY FLUID: 52 % — SIGNIFICANT CHANGE UP
NITRITE UR-MCNC: NEGATIVE — SIGNIFICANT CHANGE UP
NITRITE UR-MCNC: NEGATIVE — SIGNIFICANT CHANGE UP
NRBC # BLD: 0 /100 WBCS — SIGNIFICANT CHANGE UP (ref 0–0)
NRBC # FLD: 0 K/UL — SIGNIFICANT CHANGE UP (ref 0–0)
NT-PROBNP SERPL-SCNC: 2257 PG/ML — HIGH
OSMOLALITY UR: 170 MOSM/KG — SIGNIFICANT CHANGE UP (ref 50–1200)
OTHER CELLS FLD MANUAL: 0 % — SIGNIFICANT CHANGE UP
PH UR: 7.5 — SIGNIFICANT CHANGE UP (ref 5–8)
PH UR: 7.5 — SIGNIFICANT CHANGE UP (ref 5–8)
PLATELET # BLD AUTO: 303 K/UL — SIGNIFICANT CHANGE UP (ref 150–400)
POTASSIUM SERPL-MCNC: 4.7 MMOL/L — SIGNIFICANT CHANGE UP (ref 3.5–5.3)
POTASSIUM SERPL-SCNC: 4.7 MMOL/L — SIGNIFICANT CHANGE UP (ref 3.5–5.3)
POTASSIUM UR-SCNC: 19.3 MMOL/L — SIGNIFICANT CHANGE UP
PROT ?TM UR-MCNC: <4 MG/DL — SIGNIFICANT CHANGE UP
PROT FLD-MCNC: 3.5 G/DL — SIGNIFICANT CHANGE UP
PROT SERPL-MCNC: 7.2 G/DL — SIGNIFICANT CHANGE UP (ref 6–8.3)
PROT UR-MCNC: NEGATIVE MG/DL — SIGNIFICANT CHANGE UP
PROT UR-MCNC: NEGATIVE MG/DL — SIGNIFICANT CHANGE UP
PROT/CREAT UR-RTO: SIGNIFICANT CHANGE UP RATIO (ref 0–0.2)
PROTHROM AB SERPL-ACNC: 15 SEC — HIGH (ref 9.5–13)
RAPID RVP RESULT: SIGNIFICANT CHANGE UP
RBC # BLD: 4.06 M/UL — SIGNIFICANT CHANGE UP (ref 3.8–5.2)
RBC # FLD: 16.8 % — HIGH (ref 10.3–14.5)
RBC CASTS # UR COMP ASSIST: 1 /HPF — SIGNIFICANT CHANGE UP (ref 0–4)
RCV VOL RI: 2000 CELLS/UL — HIGH (ref 0–5)
RSV RNA SPEC QL NAA+PROBE: SIGNIFICANT CHANGE UP
RV+EV RNA SPEC QL NAA+PROBE: SIGNIFICANT CHANGE UP
SARS-COV-2 RNA SPEC QL NAA+PROBE: SIGNIFICANT CHANGE UP
SODIUM SERPL-SCNC: 128 MMOL/L — LOW (ref 135–145)
SODIUM UR-SCNC: 53 MMOL/L — SIGNIFICANT CHANGE UP
SP GR SPEC: 1 — SIGNIFICANT CHANGE UP (ref 1–1.03)
SP GR SPEC: 1 — SIGNIFICANT CHANGE UP (ref 1–1.03)
SQUAMOUS # UR AUTO: 0 /HPF — SIGNIFICANT CHANGE UP (ref 0–5)
TOTAL NUCLEATED CELL COUNT, BODY FLUID: 691 CELLS/UL — HIGH (ref 0–5)
TRIGL FLD-MCNC: 22 MG/DL — SIGNIFICANT CHANGE UP
TROPONIN T, HIGH SENSITIVITY RESULT: 15 NG/L — SIGNIFICANT CHANGE UP
UROBILINOGEN FLD QL: 0.2 MG/DL — SIGNIFICANT CHANGE UP (ref 0.2–1)
UROBILINOGEN FLD QL: 0.2 MG/DL — SIGNIFICANT CHANGE UP (ref 0.2–1)
UUN UR-MCNC: 82.4 MG/DL — SIGNIFICANT CHANGE UP
WBC # BLD: 8.76 K/UL — SIGNIFICANT CHANGE UP (ref 3.8–10.5)
WBC # FLD AUTO: 8.76 K/UL — SIGNIFICANT CHANGE UP (ref 3.8–10.5)
WBC UR QL: 0 /HPF — SIGNIFICANT CHANGE UP (ref 0–5)

## 2023-08-07 PROCEDURE — 71046 X-RAY EXAM CHEST 2 VIEWS: CPT | Mod: 26

## 2023-08-07 PROCEDURE — 99223 1ST HOSP IP/OBS HIGH 75: CPT | Mod: 25

## 2023-08-07 PROCEDURE — 88112 CYTOPATH CELL ENHANCE TECH: CPT | Mod: 26

## 2023-08-07 PROCEDURE — 88312 SPECIAL STAINS GROUP 1: CPT | Mod: 26

## 2023-08-07 PROCEDURE — 32555 ASPIRATE PLEURA W/ IMAGING: CPT | Mod: GC

## 2023-08-07 PROCEDURE — 99285 EMERGENCY DEPT VISIT HI MDM: CPT

## 2023-08-07 PROCEDURE — 71045 X-RAY EXAM CHEST 1 VIEW: CPT | Mod: 26,XU

## 2023-08-07 PROCEDURE — 88305 TISSUE EXAM BY PATHOLOGIST: CPT | Mod: 26

## 2023-08-07 PROCEDURE — 99223 1ST HOSP IP/OBS HIGH 75: CPT | Mod: GC

## 2023-08-07 PROCEDURE — 76604 US EXAM CHEST: CPT | Mod: 26,GC

## 2023-08-07 RX ORDER — ASPIRIN/CALCIUM CARB/MAGNESIUM 324 MG
81 TABLET ORAL DAILY
Refills: 0 | Status: DISCONTINUED | OUTPATIENT
Start: 2023-08-07 | End: 2023-08-10

## 2023-08-07 RX ORDER — LISINOPRIL 2.5 MG/1
40 TABLET ORAL AT BEDTIME
Refills: 0 | Status: DISCONTINUED | OUTPATIENT
Start: 2023-08-07 | End: 2023-08-07

## 2023-08-07 RX ORDER — METOPROLOL TARTRATE 50 MG
50 TABLET ORAL AT BEDTIME
Refills: 0 | Status: DISCONTINUED | OUTPATIENT
Start: 2023-08-07 | End: 2023-08-10

## 2023-08-07 RX ORDER — GLUCOSAMINE/MSM/CHONDROITIN A 750-375MG
0 TABLET ORAL
Qty: 0 | Refills: 0 | DISCHARGE

## 2023-08-07 RX ORDER — ROSUVASTATIN CALCIUM 5 MG/1
1 TABLET ORAL
Qty: 0 | Refills: 0 | DISCHARGE

## 2023-08-07 RX ORDER — L.ACIDOPH/B.ANIMALIS/B.LONGUM 15B CELL
1 CAPSULE ORAL
Qty: 0 | Refills: 0 | DISCHARGE

## 2023-08-07 RX ORDER — METOPROLOL TARTRATE 50 MG
1 TABLET ORAL
Qty: 0 | Refills: 0 | DISCHARGE

## 2023-08-07 RX ORDER — RAMIPRIL 5 MG
10 CAPSULE ORAL AT BEDTIME
Refills: 0 | Status: DISCONTINUED | OUTPATIENT
Start: 2023-08-07 | End: 2023-08-10

## 2023-08-07 RX ORDER — AMLODIPINE BESYLATE 2.5 MG/1
1 TABLET ORAL
Qty: 0 | Refills: 0 | DISCHARGE

## 2023-08-07 RX ORDER — FUROSEMIDE 40 MG
20 TABLET ORAL
Refills: 0 | Status: COMPLETED | OUTPATIENT
Start: 2023-08-07 | End: 2023-08-08

## 2023-08-07 RX ADMIN — Medication 81 MILLIGRAM(S): at 22:20

## 2023-08-07 RX ADMIN — Medication 20 MILLIGRAM(S): at 22:21

## 2023-08-07 RX ADMIN — Medication 50 MILLIGRAM(S): at 22:20

## 2023-08-07 NOTE — H&P ADULT - PROBLEM SELECTOR PLAN 7
Diet: Dash  DVT proph: SCDs given recent thora  Dispo: Home pending course. PT consult ordered Diet: Soft and bite sized  DVT proph: SCDs given recent thora  Dispo: Home pending course. PT consult ordered

## 2023-08-07 NOTE — H&P ADULT - PROBLEM SELECTOR PLAN 2
- Unclear etiology, possible in the setting of pleural effusion  - ESR and CRP elevated as above  - Will send tick borne panel  - F/u blood, pleural fluid cultures. Pleural fluid appears transudative  - Blood and urine culture negative 8/2 outpatient.   - Given no white count would monitor off of abx for now. If spikes could start cefepime - Unclear etiology, possible in the setting of pleural effusion  - ESR and CRP elevated as above  - Will send tick borne panel  - F/u blood, pleural fluid cultures. Pleural fluid appears transudative  - Blood and urine culture negative 8/2 outpatient.   - f/u autoimmune workup MEGAN, ANCA, HIV  - Given no white count would monitor off of abx for now. If spikes could start ceftriaxone and azithro - Unclear etiology, possible in the setting of pleural effusion  - ESR and CRP severely elevated, as above  - Will send tick borne panel  - F/u blood, pleural fluid cultures. Pleural fluid appears transudative  - Blood and urine culture negative 8/2 outpatient.   - f/u autoimmune workup MEGAN, ANCA, HIV  - Given no white count would monitor off of Abx for now  - If spikes fever, would start Ceftriaxone and Azithromycin - Unclear etiology, malignant pleural effusion vs inflammatory  - ESR and CRP severely elevated, as above  - Will send tick borne panel  - F/u blood, pleural fluid cultures. Pleural fluid appears transudative  - Blood and urine culture negative 8/2 outpatient.   - f/u autoimmune workup MEGAN, ANCA, HIV  - Given no white count would monitor off of Abx for now  - If spikes fever, would start Ceftriaxone and Azithromycin  -Primary team to consider Rheumatologic consultation

## 2023-08-07 NOTE — H&P ADULT - NSHPREVIEWOFSYSTEMS_GEN_ALL_CORE
REVIEW OF SYSTEMS:  CONSTITUTIONAL: + fever and weakness. Poor appetite.   EYES/ENT: No visual changes;  No vertigo or throat pain   NECK: No pain or stiffness  RESPIRATORY: + cough. No wheezing, hemoptysis; No shortness of breath  CARDIOVASCULAR: No chest pain or palpitations  GASTROINTESTINAL: No abdominal or epigastric pain. No nausea, vomiting, or hematemesis; No diarrhea or constipation. No melena or hematochezia.  GENITOURINARY: No dysuria, frequency or hematuria  NEUROLOGICAL: No numbness or weakness  SKIN: No itching, rashes

## 2023-08-07 NOTE — PROCEDURE NOTE - NSINFORMCONSENT_GEN_A_CORE
Pt. with ESRD on HD TIW admitted for chest pain.     Problem/Plan - 1:  ·  Problem: ESRD (end stage renal disease).   ·  Plan: Pt. with ESRD on HD three times a week (TTS). Had Last maintenance HD on 10/23/21 via LUE AVF. Pt. clinically stable. Plan for HD today to optimize prior to OR tomorrow. Dose meds as per HD.     Problem/Plan - 2:  ·  Problem: Anemia secondary to renal failure.  ·  Plan: Patient with anemia in the setting of ESRD. Hemoglobin below target range. Continue Retacrit 10,000 unit TIW with HD. Monitor hemoglobin.     Problem/Plan - 3:  ·  Problem: Hyperphosphatemia.  ·  Plan: Pt. on oral phosphate binders with meals. Check serum phosphorus.  
Benefits, risks, and possible complications of procedure explained to patient/caregiver who verbalized understanding and gave written consent.

## 2023-08-07 NOTE — ED ADULT TRIAGE NOTE - CHIEF COMPLAINT QUOTE
c/o difficulty breathing, difficulty ambulation, fevers and generalized weakness x 3 weeks. Phx HTN, HLD, nodules in lungs, Afib on ASA. Pt was sent to ED by Bethel Gibbs (pulmonary) c/o GRIFFITH, difficulty ambulation, fevers and generalized weakness x 3 weeks. Phx HTN, HLD, nodules in lungs, Afib on ASA. Pt was sent to ED by Bethel Gibbs (pulmonary). +2 pitting edema BLE.

## 2023-08-07 NOTE — H&P ADULT - PROBLEM SELECTOR PLAN 3
- Calculated serum osm 269  - Serum osm ordered for am  - Urine osm 170  - Urine Na 53 ISO lasix  - Uosm suggests ADH independent hyponatremia  - Pt mentions she has been drinking a lot - possible primary polydipsia vs tea and toast given poor appetite?  - Fluid restrict diet and repeat Na in am. - Pt had normal hgb in 2021. Now with microtic anemia, hgb 9.8  - Was previously up to date on colonoscopy before aging out  - Denies dark or bloody stools  - f/u iron studies in am - Pt had normal hgb in 2021. Now with microtic anemia, Hgb 9.8  - Was previously up to date on colonoscopy before aging out  - Denies dark or bloody stools  - f/u iron studies in am - Pt had normal hgb in 2021. Now with microtic anemia, Hgb 9.8  - Was previously up to date on colonoscopy before aging out  -Reports no dark or bloody stools  - f/u iron studies in am

## 2023-08-07 NOTE — H&P ADULT - PROBLEM SELECTOR PLAN 1
- Differential diagnosis includes CHF vs malignant pleural effusion  - Pt with irregular right 1.5 cm nodule that has been suspicious for lung cancer, but has been stable over last 3 years per pulm note. Also has new left lung nodules on 6/7/23 CT  - BNP elevated at 2257.   - Serum , Pleural . Serum Protein 7.2, Pleural Protein 3.5. Suggests transudative effusion  - ESR on outpatient labs 8/3 elevated to 108  - CRP on outpatient labs 8/3 elevated to 152  - Continue lasix 40mg qd  - TTE ordered. TTE in 2021 with normal LVSF EF 60-65%, mildly dilated LA, mild AS, normal PAP - Differential diagnosis includes CHF vs malignant pleural effusion  - Pt with irregular right 1.5 cm nodule that has been suspicious for lung cancer, but has been stable over last 3 years per pulm note. Also has new left lung nodules on 6/7/23 CT  - Had left VATS with pleurodesis in 2020. Pleural effusion at that time negative for malignant cells.  - BNP elevated at 2257.   - s/p thoracentesis by pulm. 1050mL removed. Simple appearing effusion.  - Serum , Pleural . Serum Protein 7.2, Pleural Protein 3.5. Suggests transudative effusion  - ESR on outpatient labs 8/3 elevated to 108  - CRP on outpatient labs 8/3 elevated to 152  - Continue lasix 40mg qd  - TTE ordered. TTE in 2021 with normal LVSF EF 60-65%, mildly dilated LA, mild AS, normal PAP - Differential diagnosis includes CHF vs malignant pleural effusion  - Pt with irregular right 1.8 cm nodule that has been suspicious for lung cancer, but has been overall stable over last 3 years (previously was 1.5cm) per pulm note. Also has new left lung nodules on 6/7/23 CT  - Had left VATS with pleurodesis in 2020. Pleural effusion at that time negative for malignant cells.  - BNP elevated at 2257.   - s/p thoracentesis by pulm. 1050mL removed. Simple appearing effusion.  - Serum , Pleural . Serum Protein 7.2, Pleural Protein 3.5. Suggests transudative effusion  - ESR on outpatient labs 8/3 elevated to 108  - CRP on outpatient labs 8/3 elevated to 152  - Continue lasix 40mg qd  - TTE ordered. TTE in 2021 with normal LVSF EF 60-65%, mildly dilated LA, mild AS, normal PAP - Differential diagnosis includes CHF vs malignant pleural effusion  - Pt with irregular right 1.8 cm nodule that has been suspicious for lung cancer, but has been overall stable over last 3 years (previously was 1.5cm) per pulm note. Also has new left lung nodules on 6/7/23 CT  - Had left VATS with pleurodesis in 2020. Pleural effusion at that time negative for malignant cells.  - BNP elevated at 2257.   - s/p thoracentesis by pulm. 1050mL removed. Simple appearing effusion.  - Serum , Pleural . Serum Protein 7.2, Pleural Protein 3.5. Suggests transudative effusion  - ESR on outpatient labs 8/3 elevated to 108  - CRP on outpatient labs 8/3 elevated to 152  - Continue lasix 20mg IV BID  - f/u pulm studies  - TTE ordered. TTE in 2021 with normal LVSF EF 60-65%, mildly dilated LA, mild AS, normal PAP GRIFFITH due to moderate pleural effusion.   s/p Emergent thoracentesis by Pulmonology; 1050mL removed. Simple appearing effusion.  Differential diagnosis includes CHF vs malignant pleural effusion vs inflammatory.   - Pt with irregular right 1.8 cm nodule that has been suspicious for lung cancer, but has been overall stable over last 3 years (previously was 1.5cm) per pulm note. Also has new left lung nodules on 6/7/23 CT  - Had left VATS with pleurodesis in 2020. Pleural effusion at that time negative for malignant cells.  - Pro-BNP elevated at 2257.   - Serum , Pleural . Serum Protein 7.2, Pleural Protein 3.5. Suggests transudative effusion  - ESR on outpatient labs 8/3 elevated to 108  - CRP on outpatient labs 8/3 elevated to 152  - Lasix 20mg IV BID x 4 dose then re-evaluate   - f/u thoracentesis fluid studies   - TTE ordered. TTE in 2021 with normal LVSF EF 60-65%, mildly dilated LA, mild AS, normal PAP

## 2023-08-07 NOTE — CONSULT NOTE ADULT - ATTENDING COMMENTS
Right transudative pleural effusion likely due to acute CHF exacerbation. Doubt infected pleural effusion. Follow-up cultures. Also doubt malignant effusion, follow-up pleural fluid cytopath. Recent fevers of unclear etiology. Infectious workup pending, including blood and urine cultures. Consider TTE. Discussed with patient, , and daughter at bedside. Right transudative pleural effusion likely due to acute CHF exacerbation. Doubt infected pleural effusion. Follow-up cultures. Also doubt malignant effusion, follow-up pleural fluid cytopath. Recent fevers of unclear etiology. Infectious workup pending, including blood and urine cultures. Consider TTE. Discussed with patient, , and daughter at bedside.    Also with known stable RLL nodule. She had repeat CT chest as outpatient performed on 8/4/23 with stable RLL 1.8 cm lung nodule, but the effusion is new. Follow-up official CT chest report.

## 2023-08-07 NOTE — H&P ADULT - ATTENDING COMMENTS
89yo Female with medical history of atrial fibrillation on aspirin, heart murmur,  lung nodules (stable), pleural effusions, hypertension a/w GRIFFITH due to R moderate pleural effusion, s/p emergent thoracentesis in ED, and FUO. Possible malignant effusion vs CHF exacerbation vs inflammatory process;    Assessment and plan supplemented and modified where indicated;

## 2023-08-07 NOTE — ED ADULT NURSE NOTE - CHIEF COMPLAINT QUOTE
c/o GRIFFITH, difficulty ambulation, fevers and generalized weakness x 3 weeks. Phx HTN, HLD, nodules in lungs, Afib on ASA. Pt was sent to ED by Bethel Gibbs (pulmonary). +2 pitting edema BLE.

## 2023-08-07 NOTE — ED PROVIDER NOTE - ATTENDING CONTRIBUTION TO CARE
Seen and examined, have discussed plan of care with resident.   I agree with note as stated above, having amended the EMR as needed to reflect the findings. 89F c/o inc. SOB, hx of pleural effusions with now intermittent fevers. Had prev. thoracentesis, and noted worsening effusion on follow up CT. No cough, +GRIFFITH, no orthopnea, +edema to legs but at baseline. NAD at rest, sl. tachypnea, dec. b.s R base, no wheeze, heart reg, abd soft, NT to palp, no CVAT, +1 edema bilat., NT calves.

## 2023-08-07 NOTE — ED PROVIDER NOTE - CLINICAL SUMMARY MEDICAL DECISION MAKING FREE TEXT BOX
89-year-old female with past medical history of atrial fibrillation on aspirin, lung nodules (stable), pleural effusions, hypertension, presenting for worsening shortness of breath and intermittent fevers for the past few weeks.  Vitals nonactionable.  Rectal temp 99.9.  Physical exam with heart regular rate and rhythm, lungs clear to auscultation, abdomen soft nondistended nontender.  Patient with known worsening pleural effusion.  Will screen for worsening CHF, ACS, infectious causes including RVP, blood cultures, UA/UC.

## 2023-08-07 NOTE — H&P ADULT - NSHPLABSRESULTS_GEN_ALL_CORE
LABS:                         9.3    8.76  )-----------( 303      ( 07 Aug 2023 11:27 )             29.1         128<L>  |  92<L>  |  17  ----------------------------<  118<H>  4.7   |  23  |  0.91    Ca    9.1      07 Aug 2023 11:27    TPro  7.2  /  Alb  3.5  /  TBili  0.4  /  DBili  x   /  AST  37<H>  /  ALT  33  /  AlkPhos  147<H>      PT/INR - ( 07 Aug 2023 11:27 )   PT: 15.0 sec;   INR: 1.34 ratio         PTT - ( 07 Aug 2023 11:27 )  PTT:28.3 sec  Urinalysis Basic - ( 07 Aug 2023 13:32 )    Color: Yellow / Appearance: Clear / S.005 / pH: x  Gluc: x / Ketone: Negative mg/dL  / Bili: Negative / Urobili: 0.2 mg/dL   Blood: x / Protein: Negative mg/dL / Nitrite: Negative   Leuk Esterase: Negative / RBC: x / WBC x   Sq Epi: x / Non Sq Epi: x / Bacteria: x            RADIOLOGY, EKG & ADDITIONAL TESTS: LABS:                         9.3    8.76  )-----------( 303      ( 07 Aug 2023 11:27 )             29.1     08-    128<L>  |  92<L>  |  17  ----------------------------<  118<H>  4.7   |  23  |  0.91    Ca    9.1      07 Aug 2023 11:27    TPro  7.2  /  Alb  3.5  /  TBili  0.4  /  DBili  x   /  AST  37<H>  /  ALT  33  /  AlkPhos  147<H>      PT/INR - ( 07 Aug 2023 11:27 )   PT: 15.0 sec;   INR: 1.34 ratio         PTT - ( 07 Aug 2023 11:27 )  PTT:28.3 sec  Urinalysis Basic - ( 07 Aug 2023 13:32 )    Color: Yellow / Appearance: Clear / S.005 / pH: x  Gluc: x / Ketone: Negative mg/dL  / Bili: Negative / Urobili: 0.2 mg/dL   Blood: x / Protein: Negative mg/dL / Nitrite: Negative   Leuk Esterase: Negative / RBC: x / WBC x   Sq Epi: x / Non Sq Epi: x / Bacteria: x            RADIOLOGY, EKG & ADDITIONAL TESTS:  < from: Xray Chest 2 Views PA/Lat (23 @ 12:16) >    IMPRESSION:  Moderate right-sided pleural effusion with associated basilar   atelectasis. Better characterized on CT of the chest 2023.  Trace left-sided pleural effusion.    < end of copied text >    < from: CT Chest No Cont (23 @ 10:04) >    IMPRESSION:  Right lower lobe 1.8 cm nodule with ill-defined borders is unchanged from   most recent prior study but enlarged from older studies. Indolent   malignancy should be considered.    A few left-sided lung nodules measuring upto 5 mm are new from the prior   study. Three-month follow-up chest CT recommended for complete evaluation.    Other bilateral lung nodules measure 5 mm are unchanged.    < end of copied text > .    -Personally interpreted EKG: Afib, 78bpm, qtc 440, no acute Tw or ST changes, no PACs or PVCs     -Personally reviewed the following labs below:                         9.3    8.76  )-----------( 303      ( 07 Aug 2023 11:27 )             29.1         128<L>  |  92<L>  |  17  ----------------------------<  118<H>  4.7   |  23  |  0.91    Ca    9.1      07 Aug 2023 11:27    TPro  7.2  /  Alb  3.5  /  TBili  0.4  /  DBili  x   /  AST  37<H>  /  ALT  33  /  AlkPhos  147<H>      PT/INR - ( 07 Aug 2023 11:27 )   PT: 15.0 sec;   INR: 1.34 ratio      PTT - ( 07 Aug 2023 11:27 )  PTT:28.3 sec  Urinalysis Basic - ( 07 Aug 2023 13:32 )    Color: Yellow / Appearance: Clear / S.005 / pH: x  Gluc: x / Ketone: Negative mg/dL  / Bili: Negative / Urobili: 0.2 mg/dL   Blood: x / Protein: Negative mg/dL / Nitrite: Negative   Leuk Esterase: Negative / RBC: x / WBC x   Sq Epi: x / Non Sq Epi: x / Bacteria: x      -Personally reviewed: Xray Chest 2 Views PA/Lat (23 @ 12:16)     IMPRESSION:  Moderate right-sided pleural effusion with associated basilar   atelectasis. Better characterized on CT of the chest 2023.  Trace left-sided pleural effusion.      -Personally reviewed: CT Chest No Cont (23 @ 10:04)     IMPRESSION:  Right lower lobe 1.8 cm nodule with ill-defined borders is unchanged from   most recent prior study but enlarged from older studies. Indolent   malignancy should be considered.  A few left-sided lung nodules measuring upto 5 mm are new from the prior   study. Three-month follow-up chest CT recommended for complete evaluation.  Other bilateral lung nodules measure 5 mm are unchanged.

## 2023-08-07 NOTE — ED ADULT NURSE NOTE - OBJECTIVE STATEMENT
Patient presenting to the ED with c/o of sob and weakness x 3 weeks. The patient also reported having a fever for several days.  Rectal temp noted 99.9 F . Patient alert and oriented x 3.  and Daughter at bedside. Patient has a hx of HTN, HLD, Afib and lung nodules. Patient noted with edema to b/l lower extremities. Patient stated " they doubled up on my water pill and now I'm peeing every minutes". Patient and family updated on the plan of care. DANIELLE KERR

## 2023-08-07 NOTE — H&P ADULT - PROBLEM SELECTOR PLAN 4
- Pt had normal hgb in 2021. Now with microtic anemia, hgb 9.8  - Was previously up to date on colonoscopy before aging out  - Denies dark or bloody stools  - f/u iron studies in am - Calculated serum osm 269  - Serum osm ordered for am  - Urine osm 170  - Urine Na 53 ISO lasix  - Uosm suggests ADH independent hyponatremia  - Pt mentions she has been drinking a lot - possible primary polydipsia vs tea and toast given poor appetite vs CHF  - Fluid restrict diet, and repeat Na in am.  - f/u urine legionella - Calculated serum osm 269  - Serum osm ordered for am  - Urine osm 170  - Urine Na 53 ISO Lasix  - Pt mentions she has been drinking a lot - possible primary polydipsia vs tea and toast given poor appetite vs CHF  - Fluid restrict diet, and repeat Na in am.  - f/u urine legionella

## 2023-08-07 NOTE — CONSULT NOTE ADULT - ASSESSMENT
89-year-old female with past medical history of atrial fibrillation on aspirin, lung nodules (stable), pleural effusion  (s/p vats pleurodesis in 2020 on left), hypertension, presenting for worsening shortness of breath and intermittent fevers for the past few weeks.    #Pleural effusion;  - patient with right sdied pleural effusion of unclear etiology  - CT scan from 8/4 (not read yet officially), shows moderate effusion and similar sized pulmonary nodule (which looks suspicious but has been followed over the past few years)  - pro BNP also elevated  - started on increased dose of lasix as an outpatient which she reports did not help her urinate more until today at the hospital  - bedside POCUS with simple appearing effusion  - also reports fevers for past 2 weeks (log book shows fever ranging from 99 to 102.7)    Recommendations  - s/p thoracentesis with 1050mL removed of serous fluid (follow up studies)  - follow up post-procedure CXR  - infectious work up per primary team (blood and urine cx) and will send pleural cultures as well  - recommend TTE--> effusion may be from heart failure as well ( given vats and pleurodesis on left, patient may present with unilateral effusion from HF); elevated proBNP

## 2023-08-07 NOTE — ED PROVIDER NOTE - PHYSICAL EXAMINATION
Const: not in acute distress  Eyes: no conjunctival injection  HEENT: Head NCAT, Moist MM.  Neck: Trachea midline.   CVS: +S1/S2, Peripheral pulses 2+ and equal in all extremities.  RESP: Unlabored respiratory effort. Clear to auscultation bilaterally.  GI: Nontender/Nondistended, No CVA tenderness b/l.   MSK: Normocephalic/Atraumatic, (+) 2+ Lower Extremities edema b/l.   Skin: Intact.   Neuro: Motor & Sensation grossly intact.  Psych: Awake, Alert, & Cooperative

## 2023-08-07 NOTE — ED ADULT NURSE NOTE - NSFALLHARMRISKINTERV_ED_ALL_ED

## 2023-08-07 NOTE — H&P ADULT - NSHPPHYSICALEXAM_GEN_ALL_CORE
VITALS:   T(C): 36.7 (08-07-23 @ 19:37), Max: 37.7 (08-07-23 @ 11:52)  HR: 90 (08-07-23 @ 19:37) (77 - 90)  BP: 126/79 (08-07-23 @ 19:37) (126/79 - 177/77)  RR: 17 (08-07-23 @ 19:37) (16 - 20)  SpO2: 97% (08-07-23 @ 19:37) (96% - 97%)    GENERAL: NAD, lying in bed comfortably  HEAD:  Atraumatic, Normocephalic  EYES: EOMI, PERRLA, conjunctiva and sclera clear  ENT: Moist mucous membranes  NECK: Supple, No JVD  CHEST/LUNG: Crackles right lower lobe.   HEART: Afib, no murmurs  Peripheral edema: 1+  ABDOMEN: BSx4; Soft, nontender, nondistended  EXTREMITIES:  2+ radial pulses  NERVOUS SYSTEM:  A&Ox3, no gross lateralizing deficits   SKIN: No rashes or lesions Vital Signs Last 24 Hrs  T(C): 36.8 (08 Aug 2023 01:10), Max: 37.7 (07 Aug 2023 11:52)  T(F): 98.2 (08 Aug 2023 01:10), Max: 99.9 (07 Aug 2023 11:52)  HR: 77 (08 Aug 2023 01:10) (77 - 97)  BP: 139/60 (08 Aug 2023 01:10) (126/79 - 177/77)  BP(mean): --  RR: 18 (08 Aug 2023 01:10) (16 - 20)  SpO2: 97% (08 Aug 2023 01:10) (96% - 98%)    Parameters below as of 08 Aug 2023 01:10  Patient On (Oxygen Delivery Method): room air      GENERAL: NAD, lying in bed comfortably  HEAD:  Atraumatic, Normocephalic  EYES: EOMI, PERRLA, conjunctiva and sclera clear  ENT: Moist mucous membranes  NECK: Supple, No JVD  CHEST/LUNG: Crackles right lower lobe.   HEART: Afib, no murmurs  Peripheral edema: 1+  ABDOMEN: BSx4; Soft, nontender, nondistended  EXTREMITIES:  2+ radial pulses  NERVOUS SYSTEM:  A&Ox3, no gross lateralizing deficits   SKIN: No rashes or lesions Vital Signs Last 24 Hrs  T(C): 36.8 (08 Aug 2023 01:10), Max: 37.7 (07 Aug 2023 11:52)  T(F): 98.2 (08 Aug 2023 01:10), Max: 99.9 (07 Aug 2023 11:52)  HR: 77 (08 Aug 2023 01:10) (77 - 97)  BP: 139/60 (08 Aug 2023 01:10) (126/79 - 177/77)  BP(mean): --  RR: 18 (08 Aug 2023 01:10) (16 - 20)  SpO2: 97% (08 Aug 2023 01:10) (96% - 98%)    Parameters below as of 08 Aug 2023 01:10  Patient On (Oxygen Delivery Method): room air

## 2023-08-07 NOTE — H&P ADULT - PROBLEM SELECTOR PLAN 5
- On metoprolol succinate 50mg qhs  - Not on anticoagulation. Only on aspirin [id number] risk stratification score 3  EKG: Afib, no evidence of RVR    - On metoprolol succinate 50mg qhs  - Only on Aspirin  - Clarify with PCP why not on anticoagulation

## 2023-08-07 NOTE — H&P ADULT - PSYCHIATRIC
Chart reviewed. Spoke to patient and nursing. Patient currently receiving 2nd unit of blood. Patient confirmed that she was completely independent prior to this admission and that she does not need PT/OT. Nursing reports patient is up ad karen in room. No history of falls. Will complete order and remove patient from PT list. Patient scheduled for discharge after transfusion. Please re-consult if status changes. alert and oriented x3/normal behavior

## 2023-08-07 NOTE — CONSULT NOTE ADULT - SUBJECTIVE AND OBJECTIVE BOX
89F with past medical history of atrial fibrillation on aspirin, lung nodules (stable), pleural effusions, hypertension, presenting for worsening shortness of breath and intermittent fevers for the past few weeks.  Patient saw her pulmonologist outpatient, Dr. Bethel Chandler.  Pulmonologist sent patient into the ED due to worsening pleural effusions on CAT scan taken on Friday.  Patient reports dyspnea on exertion, difficulty ambulating, generalized weakness.  Patient denies chest pain, nausea/vomiting, abdominal pain    PAST MEDICAL & SURGICAL HISTORY:  H/O pleural effusion      HTN (hypertension)      Hyperlipidemia      Prediabetes      Basal cell carcinoma  and squamous cell carcinoma removed      Atrial fibrillation      History of total right hip replacement  left           FAMILY HISTORY:      SOCIAL HISTORY:  Smoking: [X ] Never Smoked [ ] Former Smoker (__ packs x ___ years) [ ] Current Smoker  (__ packs x ___ years); + smoking exposure  Substance Use: [ ] Never Used [ ] Used ____  EtOH Use:  Marital Status: [ ] Single [ ]  [ ]  [ ]   Sexual History:   Occupation:  Recent Travel:  Country of Birth:  Advance Directives:    Allergies    latex (Rash)  penicillins (Swelling)    Intolerances        HOME MEDICATIONS:    REVIEW OF SYSTEMS:  Constitutional: [ X] negative [ ] fevers [ ] chills [ ] weight loss [ ] weight gain  HEENT: [ X] negative [ ] dry eyes [ ] eye irritation [ ] postnasal drip [ ] nasal congestion  CV: [X ] negative  [ ] chest pain [ ] orthopnea [ ] palpitations [ ] murmur  Resp: [X ] negative [ ] cough [ ] shortness of breath [ ] dyspnea [ ] wheezing [ ] sputum [ ] hemoptysis  GI: [X ] negative [ ] nausea [ ] vomiting [ ] diarrhea [ ] constipation [ ] abd pain [ ] dysphagia   : [ X] negative [ ] dysuria [ ] nocturia [ ] hematuria [ ] increased urinary frequency  Musculoskeletal: [ ] negative [ ] back pain [ ] myalgias [ ] arthralgias [ ] fracture  Skin: [ ] negative [ ] rash [ ] itch  Neurological: [ ] negative [ ] headache [ ] dizziness [ ] syncope [ ] weakness [ ] numbness  Psychiatric: [ ] negative [ ] anxiety [ ] depression  Endocrine: [ ] negative [ ] diabetes [ ] thyroid problem  Hematologic/Lymphatic: [ ] negative [ ] anemia [ ] bleeding problem  Allergic/Immunologic: [ ] negative [ ] itchy eyes [ ] nasal discharge [ ] hives [ ] angioedema  [ ] All other systems negative  [ ] Unable to assess ROS because ________    OBJECTIVE:  ICU Vital Signs Last 24 Hrs  T(C): 36.7 (07 Aug 2023 15:52), Max: 37.7 (07 Aug 2023 11:52)  T(F): 98 (07 Aug 2023 15:52), Max: 99.9 (07 Aug 2023 11:52)  HR: 78 (07 Aug 2023 15:52) (77 - 82)  BP: 162/69 (07 Aug 2023 15:52) (162/69 - 177/77)  BP(mean): --  ABP: --  ABP(mean): --  RR: 16 (07 Aug 2023 15:52) (16 - 20)  SpO2: 96% (07 Aug 2023 15:52) (96% - 97%)    O2 Parameters below as of 07 Aug 2023 15:52  Patient On (Oxygen Delivery Method): room air              CAPILLARY BLOOD GLUCOSE          PHYSICAL EXAM:  General: well appearing, NAD, sitting in bed  HEENT: no icterus  Neck: supple  Respiratory: decreased breath sounds at right abse  Cardiovascular: s1/s2, rrr  Abdomen:  soft, nontender  Extremities: no LE edema  Skin: no rashes  Neurological: AxOX3  Psychiatry: normal mood and affect    LABS:                        9.3    8.76  )-----------( 303      ( 07 Aug 2023 11:27 )             29.1     Hgb Trend: 9.3<--  08    128<L>  |  92<L>  |  17  ----------------------------<  118<H>  4.7   |  23  |  0.91    Ca    9.1      07 Aug 2023 11:27    TPro  7.2  /  Alb  3.5  /  TBili  0.4  /  DBili  x   /  AST  37<H>  /  ALT  33  /  AlkPhos  147<H>  08    Creatinine Trend: 0.91<--  PT/INR - ( 07 Aug 2023 11:27 )   PT: 15.0 sec;   INR: 1.34 ratio         PTT - ( 07 Aug 2023 11:27 )  PTT:28.3 sec  Urinalysis Basic - ( 07 Aug 2023 13:32 )    Color: Yellow / Appearance: Clear / S.005 / pH: x  Gluc: x / Ketone: Negative mg/dL  / Bili: Negative / Urobili: 0.2 mg/dL   Blood: x / Protein: Negative mg/dL / Nitrite: Negative   Leuk Esterase: Negative / RBC: x / WBC x   Sq Epi: x / Non Sq Epi: x / Bacteria: x            MICROBIOLOGY:     RADIOLOGY:  [X ] Reviewed and interpreted by me

## 2023-08-07 NOTE — CHART NOTE - NSCHARTNOTEFT_GEN_A_CORE
: Yarely Nguyen    INDICATION: pleural effusion    PROCEDURE:  [ ] LIMITED ECHO  [X ] LIMITED CHEST  [ ] LIMITED RETROPERITONEAL  [ ] LIMITED ABDOMINAL  [ ] LIMITED DVT  [ ] NEEDLE GUIDANCE VASCULAR  [ ] NEEDLE GUIDANCE THORACENTESIS  [ ] NEEDLE GUIDANCE PARACENTESIS  [ ] NEEDLE GUIDANCE PERICARDIOCENTESIS  [ ] OTHER    FINDINGS:  Moderate right sided pleural effusion, appears simple without septations. Thoracentesis site marked under US guidance.  S/p thoracentesis with trace effusion remaining. + Lung sliding noted post procedure.    INTERPRETATION:  s/p right sided thoracentesis    Images stored on [employer]. : Yarely Nguyen    INDICATION: pleural effusion    PROCEDURE:  [ ] LIMITED ECHO  [X ] LIMITED CHEST  [ ] LIMITED RETROPERITONEAL  [ ] LIMITED ABDOMINAL  [ ] LIMITED DVT  [ ] NEEDLE GUIDANCE VASCULAR  [ ] NEEDLE GUIDANCE THORACENTESIS  [ ] NEEDLE GUIDANCE PARACENTESIS  [ ] NEEDLE GUIDANCE PERICARDIOCENTESIS  [ ] OTHER    FINDINGS:  Moderate right sided pleural effusion, appears simple without septations. Thoracentesis site marked under US guidance.  S/p thoracentesis with trace effusion remaining. + Lung sliding noted post procedure anteriorly.    INTERPRETATION:  s/p right sided thoracentesis    Images stored on [employer].

## 2023-08-07 NOTE — ED PROVIDER NOTE - OBJECTIVE STATEMENT
89-year-old female with past medical history of atrial fibrillation on aspirin, lung nodules (stable), pleural effusions, hypertension, presenting for worsening shortness of breath and intermittent fevers for the past few weeks.  Patient saw her pulmonologist outpatient, Dr. Bethel Chandler.  Pulmonologist sent patient into the ED due to worsening pleural effusions on CAT scan taken on Friday.  Patient reports dyspnea on exertion, difficulty ambulating, generalized weakness.  Patient denies chest pain, nausea/vomiting, abdominal pain

## 2023-08-07 NOTE — H&P ADULT - NSHPSOCIALHISTORY_GEN_ALL_CORE
Walks independently, drives by herself. Cooks her own meals. Lives with . Christiana . Possible asbestos and radiation exposure 50 years ago for 1 summer. Likes to go to the park in her free time.

## 2023-08-07 NOTE — ED PROVIDER NOTE - CARE PLAN
1 Principal Discharge DX:	Dyspnea on exertion   Principal Discharge DX:	Pleural effusion  Secondary Diagnosis:	Fever

## 2023-08-07 NOTE — H&P ADULT - HISTORY OF PRESENT ILLNESS
89F with past medical history of atrial fibrillation on aspirin, lung nodules (stable), pleural effusions, hypertension, presenting for worsening shortness of breath and intermittent fevers for the past few weeks.  Patient saw her pulmonologist outpatient, Dr. Bethel Chandler.  Pulmonologist sent patient into the ED due to worsening pleural effusions on CAT scan taken on Friday.  Patient reports dyspnea on exertion, difficulty ambulating, generalized weakness.  Patient denies chest pain, nausea/vomiting, abdominal pain.    s/p 1050 mL thoracentesis by pulm in the ED. 89F with past medical history of atrial fibrillation on aspirin, lung nodules (stable), pleural effusions, hypertension, presenting for worsening shortness of breath and intermittent fevers for the past 3 weeks, with t max at home of 102.7. Patient saw her pulmonologist outpatient, Dr. Bethel Chandler.  Pulmonologist sent patient into the ED due to worsening pleural effusions on CAT scan taken on Friday. Patient reports dyspnea on exertion, difficulty ambulating, generalized weakness. Patient denies chest pain, nausea/vomiting, abdominal pain.    ED Course:  Tmax 99  -177/69-82  HR afib 70's-90's.  On room air during my exam.    On my exam in the ED, she is s/p 1050 mL thoracentesis by pulm. She is feeling much better and no longer short of breath. Her appetite is good after the thora and she is asking to eat. She denies recent travel or tick bites. Dr. Chandler had increased her lasix from 20mg qd to 40mg qd on 8/4, and her urine output did not increase until today, 8/7. She also reports that she has been quite thirsty at home and has been drinking lots of water.  88yo Female with medical history of atrial fibrillation on aspirin, lung nodules (stable), pleural effusions, hypertension, presenting for worsening shortness of breath and intermittent fevers for the past 3 weeks, with t max at home of 102.7. Patient saw her pulmonologist outpatient, Dr. Bethel Chandler.  Pulmonologist sent patient into the ED due to worsening pleural effusions on CAT scan taken on Friday. Patient reports dyspnea on exertion, difficulty ambulating, generalized weakness. Patient denies chest pain, nausea/vomiting, abdominal pain.    ED Course:  Tmax 99  -177/69-82  HR afib 70's-90's.  On room air during my exam.    On my exam in the ED, she is s/p 1050 mL thoracentesis by pulm. She is feeling much better and no longer short of breath. Her appetite is good after the thora and she is asking to eat. She denies recent travel or tick bites. Dr. Chandler had increased her lasix from 20mg qd to 40mg qd on 8/4, and her urine output did not increase until today, 8/7. She also reports that she has been quite thirsty at home and has been drinking lots of water.  88yo Female with medical history of atrial fibrillation on aspirin, heart murmur, lung nodules (stable), pleural effusions, hypertension, presenting for worsening shortness of breath and intermittent fevers for the past 3 weeks, with t max at home of 102.7. Patient saw her pulmonologist outpatient, Dr. Bethel Chandler.  Pulmonologist sent patient into the ED due to worsening pleural effusions on CAT scan taken on Friday. Patient reports dyspnea on exertion, difficulty ambulating, generalized weakness. Patient denies chest pain, nausea/vomiting, abdominal pain.  She denies recent travel or tick bites. Dr. Chandler had increased her lasix from 20mg qd to 40mg qd on 8/4, and her urine output did not increase until today, 8/7. She also reports that she has been quite thirsty at home and has been drinking lots of water.     ED Course:  s/p 1050 mL thoracentesis by Pulmonology  Tmax 99  -177/69-82  HR afib 70's-90's.  On room air during my exam.

## 2023-08-07 NOTE — ED ADULT TRIAGE NOTE - CCCP TRG CHIEF CMPLNT
Pt ambulated well with walker. Pt and daughter stated that they have a walker available to use at home. Dr. Murphy made aware. difficulty breathing, generalized weakness/fever GRIFFITH, generalized weakness/fever

## 2023-08-07 NOTE — H&P ADULT - MUSCULOSKELETAL
no joint erythema/no joint warmth/no calf tenderness/strength 5/5 bilateral upper extremities/strength 5/5 bilateral lower extremities

## 2023-08-07 NOTE — H&P ADULT - ASSESSMENT
Melissa Medina is an 89 YOF with afib not on anticoagulation, lung nodules who presented with subacute dyspnea on exertion + fevers, found to have moderate right pleural effusion concerning for malignant effusion vs CHF exacerbation, s/p thoracentesis by pulm in the ED. 88yo Female with medical history of atrial fibrillation on aspirin, heart murmur,  lung nodules (stable), pleural effusions, hypertension a/w GRIFFITH due to R moderate pleural effusion, s/p emergent thoracentesis in ED, and FUO. Possible malignant effusion vs CHF exacerbation vs inflammatory process;

## 2023-08-07 NOTE — H&P ADULT - TIME BILLING
Multiple active medical problems;   Reviewed admission imaging reports, and admission labs prior to the encounter with  the patient   Reviewed Triage and ED course/ documentation   Reviewed consultants notes, including::: Pulmonology  Performed full physical exam and ROS  Reviewed outpatient records on All[facility]ript[facility]  Obtained list of home medications and performed home medications reconciliation on EMR  Discussed prognosis, treatment and its risk and benefits of monitoring off Abx with the patient  Ordered or reviewed  new admission medications and placed or reviewed all hospitalization admission orders  Managed (continued, held or adjusted doses) of home medications   Ordered  or reviewed orders of  new imaging and new lab w/up

## 2023-08-07 NOTE — H&P ADULT - PROBLEM SELECTOR PLAN 6
- Ramipril 10 at home --> lisinopril 40 - Ramipril 10 - c/w Metoprolol   - Hold Ramipril given s/p 1L thoracentesis and started on Lasix IV   - Monitor renal function

## 2023-08-08 LAB
ALBUMIN SERPL ELPH-MCNC: 3.1 G/DL — LOW (ref 3.3–5)
ALP SERPL-CCNC: 141 U/L — HIGH (ref 40–120)
ALT FLD-CCNC: 31 U/L — SIGNIFICANT CHANGE UP (ref 4–33)
ANION GAP SERPL CALC-SCNC: 15 MMOL/L — HIGH (ref 7–14)
AST SERPL-CCNC: 33 U/L — HIGH (ref 4–32)
BACTERIA BLD CULT: NORMAL
BASOPHILS # BLD AUTO: 0.04 K/UL — SIGNIFICANT CHANGE UP (ref 0–0.2)
BASOPHILS NFR BLD AUTO: 0.5 % — SIGNIFICANT CHANGE UP (ref 0–2)
BILIRUB SERPL-MCNC: 0.4 MG/DL — SIGNIFICANT CHANGE UP (ref 0.2–1.2)
BLD GP AB SCN SERPL QL: NEGATIVE — SIGNIFICANT CHANGE UP
BUN SERPL-MCNC: 18 MG/DL — SIGNIFICANT CHANGE UP (ref 7–23)
CALCIUM SERPL-MCNC: 8.7 MG/DL — SIGNIFICANT CHANGE UP (ref 8.4–10.5)
CHLORIDE SERPL-SCNC: 93 MMOL/L — LOW (ref 98–107)
CO2 SERPL-SCNC: 22 MMOL/L — SIGNIFICANT CHANGE UP (ref 22–31)
CREAT SERPL-MCNC: 0.88 MG/DL — SIGNIFICANT CHANGE UP (ref 0.5–1.3)
CRP SERPL-MCNC: 123.5 MG/L — HIGH
EGFR: 63 ML/MIN/1.73M2 — SIGNIFICANT CHANGE UP
EOSINOPHIL # BLD AUTO: 0.28 K/UL — SIGNIFICANT CHANGE UP (ref 0–0.5)
EOSINOPHIL NFR BLD AUTO: 3.2 % — SIGNIFICANT CHANGE UP (ref 0–6)
ERYTHROCYTE [SEDIMENTATION RATE] IN BLOOD: 59 MM/HR — HIGH (ref 4–25)
FERRITIN SERPL-MCNC: 275 NG/ML — SIGNIFICANT CHANGE UP (ref 13–330)
GLUCOSE SERPL-MCNC: 171 MG/DL — HIGH (ref 70–99)
GRAM STN FLD: SIGNIFICANT CHANGE UP
HCT VFR BLD CALC: 32.1 % — LOW (ref 34.5–45)
HGB BLD-MCNC: 9.9 G/DL — LOW (ref 11.5–15.5)
HIV 1+2 AB+HIV1 P24 AG SERPL QL IA: SIGNIFICANT CHANGE UP
IANC: 6.55 K/UL — SIGNIFICANT CHANGE UP (ref 1.8–7.4)
IMM GRANULOCYTES NFR BLD AUTO: 0.8 % — SIGNIFICANT CHANGE UP (ref 0–0.9)
IRON SATN MFR SERPL: 10 % — LOW (ref 14–50)
IRON SATN MFR SERPL: 19 UG/DL — LOW (ref 30–160)
LEGIONELLA AG UR QL: NEGATIVE — SIGNIFICANT CHANGE UP
LYMPHOCYTES # BLD AUTO: 0.77 K/UL — LOW (ref 1–3.3)
LYMPHOCYTES # BLD AUTO: 8.9 % — LOW (ref 13–44)
MAGNESIUM SERPL-MCNC: 2.1 MG/DL — SIGNIFICANT CHANGE UP (ref 1.6–2.6)
MCHC RBC-ENTMCNC: 22.8 PG — LOW (ref 27–34)
MCHC RBC-ENTMCNC: 30.8 GM/DL — LOW (ref 32–36)
MCV RBC AUTO: 73.8 FL — LOW (ref 80–100)
MONOCYTES # BLD AUTO: 0.98 K/UL — HIGH (ref 0–0.9)
MONOCYTES NFR BLD AUTO: 11.3 % — SIGNIFICANT CHANGE UP (ref 2–14)
NEUTROPHILS # BLD AUTO: 6.55 K/UL — SIGNIFICANT CHANGE UP (ref 1.8–7.4)
NEUTROPHILS NFR BLD AUTO: 75.3 % — SIGNIFICANT CHANGE UP (ref 43–77)
NIGHT BLUE STAIN TISS: SIGNIFICANT CHANGE UP
NRBC # BLD: 0 /100 WBCS — SIGNIFICANT CHANGE UP (ref 0–0)
NRBC # FLD: 0 K/UL — SIGNIFICANT CHANGE UP (ref 0–0)
OSMOLALITY SERPL: 274 MOSM/KG — LOW (ref 275–295)
PH FLD: 7.9 — SIGNIFICANT CHANGE UP
PHOSPHATE SERPL-MCNC: 3.6 MG/DL — SIGNIFICANT CHANGE UP (ref 2.5–4.5)
PLATELET # BLD AUTO: 330 K/UL — SIGNIFICANT CHANGE UP (ref 150–400)
POTASSIUM SERPL-MCNC: 4 MMOL/L — SIGNIFICANT CHANGE UP (ref 3.5–5.3)
POTASSIUM SERPL-SCNC: 4 MMOL/L — SIGNIFICANT CHANGE UP (ref 3.5–5.3)
PROT SERPL-MCNC: 6.7 G/DL — SIGNIFICANT CHANGE UP (ref 6–8.3)
RBC # BLD: 4.35 M/UL — SIGNIFICANT CHANGE UP (ref 3.8–5.2)
RBC # FLD: 16.8 % — HIGH (ref 10.3–14.5)
RH IG SCN BLD-IMP: POSITIVE — SIGNIFICANT CHANGE UP
SODIUM SERPL-SCNC: 130 MMOL/L — LOW (ref 135–145)
SPECIMEN SOURCE: SIGNIFICANT CHANGE UP
SPECIMEN SOURCE: SIGNIFICANT CHANGE UP
T4 FREE+ TSH PNL SERPL: 2.74 UIU/ML — SIGNIFICANT CHANGE UP (ref 0.27–4.2)
TIBC SERPL-MCNC: 195 UG/DL — LOW (ref 220–430)
TRANSFERRIN SERPL-MCNC: 163 MG/DL — LOW (ref 200–360)
UIBC SERPL-MCNC: 176 UG/DL — SIGNIFICANT CHANGE UP (ref 110–370)
WBC # BLD: 8.69 K/UL — SIGNIFICANT CHANGE UP (ref 3.8–10.5)
WBC # FLD AUTO: 8.69 K/UL — SIGNIFICANT CHANGE UP (ref 3.8–10.5)

## 2023-08-08 PROCEDURE — 93306 TTE W/DOPPLER COMPLETE: CPT | Mod: 26

## 2023-08-08 PROCEDURE — 99233 SBSQ HOSP IP/OBS HIGH 50: CPT | Mod: GC

## 2023-08-08 RX ORDER — FERROUS SULFATE 325(65) MG
325 TABLET ORAL DAILY
Refills: 0 | Status: DISCONTINUED | OUTPATIENT
Start: 2023-08-08 | End: 2023-08-10

## 2023-08-08 RX ADMIN — Medication 50 MILLIGRAM(S): at 22:10

## 2023-08-08 RX ADMIN — Medication 1 TABLET(S): at 14:07

## 2023-08-08 RX ADMIN — Medication 325 MILLIGRAM(S): at 18:05

## 2023-08-08 RX ADMIN — Medication 10 MILLIGRAM(S): at 22:10

## 2023-08-08 RX ADMIN — Medication 10 MILLIGRAM(S): at 00:36

## 2023-08-08 RX ADMIN — Medication 81 MILLIGRAM(S): at 12:09

## 2023-08-08 RX ADMIN — Medication 20 MILLIGRAM(S): at 05:09

## 2023-08-08 RX ADMIN — Medication 20 MILLIGRAM(S): at 14:08

## 2023-08-08 NOTE — PROGRESS NOTE ADULT - PROBLEM SELECTOR PLAN 3
- Pt had normal hgb in 2021. Now with microtic anemia, Hgb 9.8  - Was previously up to date on colonoscopy before aging out  - Reports no dark or bloody stools  - f/u iron studies in am - Pt had normal hgb in 2021. Now with microtic anemia, Hgb 9.8, iron 19, Ferritin 275, TIBC 195  - Likely mixed iron deficiency anemia and anemia of chronic disease given iron study  - Was previously up to date on colonoscopy before aging out  - Reports no dark or bloody stools  - ctm - Pt had normal hgb in 2021. Now with microtic anemia, Hgb 9.8, iron 19, Ferritin 275, TIBC 195  - Likely mixed iron deficiency anemia and anemia of chronic disease given iron study  - Was previously up to date on colonoscopy before aging out  - Reports no dark or bloody stools  - start ferrous sulfate 325 qd  - ctm

## 2023-08-08 NOTE — PROVIDER CONTACT NOTE (OTHER) - ACTION/TREATMENT ORDERED:
MAR made aware. WIll monitor intake and output and will repeat blood pressure in an hour and send am labs as ordered. Nursing care to continue

## 2023-08-08 NOTE — PROGRESS NOTE ADULT - ASSESSMENT
88yo Female with medical history of atrial fibrillation on aspirin, heart murmur,  lung nodules (stable), pleural effusions, hypertension a/w RGIFFITH due to R moderate pleural effusion, s/p emergent thoracentesis in ED, and FUO. Possible malignant effusion vs CHF exacerbation vs inflammatory process; 88yo Female with medical history of atrial fibrillation on aspirin, heart murmur,  lung nodules (stable), pleural effusions, hypertension a/w GRIFFITH due to R moderate pleural effusion, s/p emergent thoracentesis in ED, and FUO. Possible malignant effusion vs CHF exacerbation vs inflammatory process;

## 2023-08-08 NOTE — PHYSICAL THERAPY INITIAL EVALUATION ADULT - IMPAIRMENTS CONTRIBUTING TO GAIT DEVIATIONS, PT EVAL
+GRIFFITH requiring multiple standing rest breaks to recover SOB before continuing/impaired balance/impaired postural control/decreased strength

## 2023-08-08 NOTE — PHYSICAL THERAPY INITIAL EVALUATION ADULT - PERTINENT HX OF CURRENT PROBLEM, REHAB EVAL
89 year old Female with medical history of atrial fibrillation on aspirin, heart murmur,  lung nodules (stable), pleural effusions, hypertension admitted with GRIFFITH due to Right moderate pleural effusion, s/p emergent thoracentesis in ED, and FUO. Possible malignant effusion vs CHF exacerbation vs inflammatory process;

## 2023-08-08 NOTE — PROGRESS NOTE ADULT - ATTENDING COMMENTS
89yo Female with atrial fibrillation on aspirin, AS, lung nodules (stable), hx of pleural effusions, hypertension a/w GRIFFITH due to R moderate pleural effusion, s/p  thoracentesis with 1L fluid removed. Pleural fluid analysis with transudative effusion. Reported fever at home, here afebrile, no leukocytosis, pleural fluid gram stain negative. Elevated pro-BNP, hx of mild AS, afib, trace edema, with pleural effusion, likely due to HFpEF. F.u repeat TTE, one more day of IV diuresis, reassess tomorrow. F.u pleural fluid cytology and culture. Anemia- likely multifactorial, iron def and AOCD- start PO iron. Hypotonic hyponatremia- likely due to CHF, improving on diuretics, trend BMP.

## 2023-08-08 NOTE — PROGRESS NOTE ADULT - PROBLEM SELECTOR PLAN 6
- c/w Metoprolol   - Hold Ramipril given s/p 1L thoracentesis and started on Lasix IV   - Monitor renal function

## 2023-08-08 NOTE — PHYSICAL THERAPY INITIAL EVALUATION ADULT - GENERAL OBSERVATIONS, REHAB EVAL
Pt received semi-supine in bed, +primafit, all lines intact, in NAD. Pt agreeable to participate in PT evaluation.

## 2023-08-08 NOTE — PHYSICAL THERAPY INITIAL EVALUATION ADULT - ADDITIONAL COMMENTS
Pt lives in a private house with her , +steps inside ([place]), was independent with all functional mobility and ADL performance without use of an assistive device up until recently where she began using a Single Axis Cane.     Pt left semi-supine in bed, all lines intact, all needs in reach, bed alarm set, in NAD. CIRILO Johnson aware. HR 78 beats per minute.

## 2023-08-08 NOTE — PROGRESS NOTE ADULT - PROBLEM SELECTOR PLAN 2
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 Pleural fluid appears transudative  - Blood and urine culture negative 8/2 outpatient.   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

## 2023-08-08 NOTE — PROGRESS NOTE ADULT - PROBLEM SELECTOR PLAN 7
Diet: Soft and bite sized  DVT proph: SCDs given recent thora  Dispo: Home pending course. PT consult ordered Diet: Regular  DVT proph: Lovenox  Dispo: Home pending course. PT consult ordered Diet: Regular  DVT proph: SCD, patient refused AC  Dispo: Home pending course. PT consult ordered

## 2023-08-08 NOTE — PROGRESS NOTE ADULT - ASSESSMENT
89-year-old female with past medical history of atrial fibrillation on aspirin, lung nodules (stable), pleural effusion  (s/p vats pleurodesis in 2020 on left), hypertension, presenting for worsening shortness of breath and intermittent fevers for the past few weeks.    #Pleural effusion;  - patient with right sdied pleural effusion of unclear etiology  - CT scan from 8/4 (not read yet officially), shows moderate effusion and similar sized pulmonary nodule (which looks suspicious but has been followed over the past few years)  - pro BNP also elevated  - started on increased dose of lasix as an outpatient which she reports did not help her urinate more until today at the hospital  - bedside POCUS with simple appearing effusion  - also reports fevers for past 2 weeks (log book shows fever ranging from 99 to 102.7)    Recommendations  - s/p thoracentesis with 1050mL removed of serous fluid (follow up studies)  - follow up post-procedure CXR  - infectious work up per primary team (blood and urine cx) and will send pleural cultures as well  - recommend TTE--> effusion may be from heart failure as well ( given vats and pleurodesis on left, patient may present with unilateral effusion from HF); elevated proBNP 89-year-old female with past medical history of atrial fibrillation on aspirin, lung nodules (stable), pleural effusion  (s/p vats pleurodesis in 2020 on left), hypertension, presenting for worsening shortness of breath and intermittent fevers for the past few weeks.    #Pleural effusion;  - patient with right sided pleural effusion of unclear etiology  - CT scan from 8/4 (not read yet officially), shows moderate effusion and similar sized pulmonary nodule (which looks suspicious but has been followed over the past few years)  - pro BNP also elevated  - started on increased dose of lasix as an outpatient which she reports did not help her urinate more until today at the hospital  - bedside POCUS with simple appearing effusion  - also reports fevers for past 2 weeks (log book shows fever ranging from 99 to 102.7)    Recommendations  - s/p thoracentesis with 1050mL removed of serous fluid   - post procedure CXR without PTX; patient feels much improved from a respiratory standpoint  - effusion is transudative without suggestion of infection; follow up cytopath  - infectious work up per primary team (blood and urine cx) and will send pleural cultures as well  - recommend TTE--> effusion may be from heart failure as well ( given vats and pleurodesis on left, patient may present with unilateral effusion from HF); elevated proBNP

## 2023-08-08 NOTE — PROGRESS NOTE ADULT - PROBLEM SELECTOR PLAN 4
- Calculated serum osm 269  - Serum osm ordered for am  - Urine osm 170  - Urine Na 53 ISO Lasix  - Pt mentions she has been drinking a lot - possible primary polydipsia vs tea and toast given poor appetite vs CHF  - Fluid restrict diet, and repeat Na in am.  - f/u urine legionella - sodium 130, sOsm 274, uOsm 170, Stephanie 53 iso Lasix  - patient states she has been drinking a lot of fluid and not eating a lot recently 2/2 nausea, patient appears clinically euvolemic  - Likely mixed tea and toast + CHF  - Fluid restrict diet  - c/w Lasix 20mg BID (8/7-8/8)  - f/u urine legionella

## 2023-08-08 NOTE — PROGRESS NOTE ADULT - ATTENDING COMMENTS
Right transudative pleural effusion likely due to acute CHF exacerbation. Doubt infected pleural effusion. Cultures negative so far. Follow-up pleural fluid cytopath.     Recent fevers of unclear etiology. UA negative. Follow-up blood cultures. TTE with no suggestion of endocarditis. She has mild MR, mildly dilated LA, normal LV systolic function, severe MEGAN/RVE, normal TV, borderline pulmonary pressure (PASP 37), no pericardial effusion.    Also with known stable RLL nodule. She had repeat CT chest as outpatient performed on 8/4/23 with stable RLL 1.8 cm lung nodule, but the effusion is new.    D/c planning with close outpatient follow-up with Dr. Chandler.

## 2023-08-08 NOTE — PROGRESS NOTE ADULT - SUBJECTIVE AND OBJECTIVE BOX
PROGRESS NOTE:     Patient is a 89y old  Female who presents with a chief complaint of SOB (07 Aug 2023 19:12)      SUBJECTIVE / OVERNIGHT EVENTS: NAEO, AAOx4, feeling better, sob and cough improved, good appetite, no acute complaints.    ADDITIONAL REVIEW OF SYSTEMS: 10 point ROS negative except per HPI    MEDICATIONS  (STANDING):  aspirin  chewable 81 milliGRAM(s) Oral daily  furosemide   Injectable 20 milliGRAM(s) IV Push two times a day  metoprolol succinate ER 50 milliGRAM(s) Oral at bedtime  ramipril 10 milliGRAM(s) Oral at bedtime    MEDICATIONS  (PRN):      CAPILLARY BLOOD GLUCOSE        I&O's Summary    07 Aug 2023 07:01  -  08 Aug 2023 07:00  --------------------------------------------------------  IN: 250 mL / OUT: 1350 mL / NET: -1100 mL        PHYSICAL EXAM:  Vital Signs Last 24 Hrs  T(C): 36.8 (08 Aug 2023 05:07), Max: 37.7 (07 Aug 2023 11:52)  T(F): 98.2 (08 Aug 2023 05:07), Max: 99.9 (07 Aug 2023 11:52)  HR: 79 (08 Aug 2023 06:22) (76 - 97)  BP: 154/65 (08 Aug 2023 06:22) (126/79 - 177/77)  BP(mean): --  RR: 17 (08 Aug 2023 06:22) (16 - 20)  SpO2: 97% (08 Aug 2023 06:22) (96% - 98%)    Parameters below as of 08 Aug 2023 06:22  Patient On (Oxygen Delivery Method): room air        CONSTITUTIONAL: NAD, well-developed, A&Ox3 to person, place, time.  RESPIRATORY: Normal respiratory effort; lungs are clear to auscultation bilaterally  CARDIOVASCULAR: Regular rate and rhythm, normal S1 and S2, no murmur/rub/gallop; No lower extremity edema; Peripheral pulses are 2+ bilaterally  ABDOMEN: Nontender to palpation, no rebound/guarding; No hepatosplenomegaly  MUSCLOSKELETAL: no clubbing or cyanosis of digits; no joint swelling or tenderness to palpation  NEURO: CN 2-12 grossly intact, moves all limbs spontaneously      LABS:                          9.9    8.69  )-----------( 330      ( 08 Aug 2023 06:34 )             32.1     08-08    130<L>  |  93<L>  |  18  ----------------------------<  171<H>  4.0   |  22  |  0.88    Ca    8.7      08 Aug 2023 06:34  Phos  3.6     08-08  Mg     2.10     08-08    TPro  6.7  /  Alb  3.1<L>  /  TBili  0.4  /  DBili  x   /  AST  33<H>  /  ALT  31  /  AlkPhos  141<H>  08-08    PT/INR - ( 07 Aug 2023 11:27 )   PT: 15.0 sec;   INR: 1.34 ratio         PTT - ( 07 Aug 2023 11:27 )  PTT:28.3 sec      -----    MICRO  Urinalysis Basic - ( 08 Aug 2023 06:34 )    Color: x / Appearance: x / SG: x / pH: x  Gluc: 171 mg/dL / Ketone: x  / Bili: x / Urobili: x   Blood: x / Protein: x / Nitrite: x   Leuk Esterase: x / RBC: x / WBC x   Sq Epi: x / Non Sq Epi: x / Bacteria: x        Culture - Fungal, Body Fluid (collected 07 Aug 2023 20:39)  Source: Pleural Fl Pleural Fluid  Preliminary Report (08 Aug 2023 06:48):    Testing in progress    Culture - Body Fluid with Gram Stain (collected 07 Aug 2023 20:39)  Source: Pleural Fl Pleural Fluid  Gram Stain (08 Aug 2023 02:47):    No polymorphonuclear leukocytes seen    No organisms seen    by cytocentrifuge      -----    TRENDS  Hemoglobin: 9.9 g/dL (08-08 @ 06:34)  Hemoglobin: 9.3 g/dL (08-07 @ 11:27)    Creatinine Trend: 0.88<--, 0.91<--  ----  RADIOLOGY & ADDITIONAL TESTS:  Results Reviewed:   Imaging Personally Reviewed:  Electrocardiogram Personally Reviewed:    COORDINATION OF CARE:  Care Discussed with Consultants/Other Providers [Y/N]:  Prior or Outpatient Records Reviewed [Y/N]:   PROGRESS NOTE:     Patient is a 89y old  Female who presents with a chief complaint of SOB (07 Aug 2023 19:12)      SUBJECTIVE / OVERNIGHT EVENTS: No acute events overnight, s/p thoracentesis which took out 1050cc of clear yellow fluid. Patient states her symptoms are much improved, denies SOB, cough, chest pain, n/v/d.     ADDITIONAL REVIEW OF SYSTEMS: 10 point ROS negative except per HPI    MEDICATIONS  (STANDING):  aspirin  chewable 81 milliGRAM(s) Oral daily  furosemide   Injectable 20 milliGRAM(s) IV Push two times a day  metoprolol succinate ER 50 milliGRAM(s) Oral at bedtime  ramipril 10 milliGRAM(s) Oral at bedtime    MEDICATIONS  (PRN):      CAPILLARY BLOOD GLUCOSE        I&O's Summary    07 Aug 2023 07:01  -  08 Aug 2023 07:00  --------------------------------------------------------  IN: 250 mL / OUT: 1350 mL / NET: -1100 mL        PHYSICAL EXAM:  Vital Signs Last 24 Hrs  T(C): 36.8 (08 Aug 2023 05:07), Max: 37.7 (07 Aug 2023 11:52)  T(F): 98.2 (08 Aug 2023 05:07), Max: 99.9 (07 Aug 2023 11:52)  HR: 79 (08 Aug 2023 06:22) (76 - 97)  BP: 154/65 (08 Aug 2023 06:22) (126/79 - 177/77)  BP(mean): --  RR: 17 (08 Aug 2023 06:22) (16 - 20)  SpO2: 97% (08 Aug 2023 06:22) (96% - 98%)    Parameters below as of 08 Aug 2023 06:22  Patient On (Oxygen Delivery Method): room air        CONSTITUTIONAL: NAD, well-developed, A&Ox3 to person, place, time.  RESPIRATORY: Normal respiratory effort; lungs are clear to auscultation bilaterally  CARDIOVASCULAR: Regular rate and rhythm, normal S1 and S2, no murmur/rub/gallop; No lower extremity edema; Peripheral pulses are 2+ bilaterally  ABDOMEN: Nontender to palpation, no rebound/guarding; No hepatosplenomegaly  MUSCLOSKELETAL: no clubbing or cyanosis of digits; no joint swelling or tenderness to palpation  NEURO: CN 2-12 grossly intact, moves all limbs spontaneously      LABS:                          9.9    8.69  )-----------( 330      ( 08 Aug 2023 06:34 )             32.1     08-08    130<L>  |  93<L>  |  18  ----------------------------<  171<H>  4.0   |  22  |  0.88    Ca    8.7      08 Aug 2023 06:34  Phos  3.6     08-08  Mg     2.10     08-08    TPro  6.7  /  Alb  3.1<L>  /  TBili  0.4  /  DBili  x   /  AST  33<H>  /  ALT  31  /  AlkPhos  141<H>  08-08    PT/INR - ( 07 Aug 2023 11:27 )   PT: 15.0 sec;   INR: 1.34 ratio         PTT - ( 07 Aug 2023 11:27 )  PTT:28.3 sec      -----    MICRO  Urinalysis Basic - ( 08 Aug 2023 06:34 )    Color: x / Appearance: x / SG: x / pH: x  Gluc: 171 mg/dL / Ketone: x  / Bili: x / Urobili: x   Blood: x / Protein: x / Nitrite: x   Leuk Esterase: x / RBC: x / WBC x   Sq Epi: x / Non Sq Epi: x / Bacteria: x        Culture - Fungal, Body Fluid (collected 07 Aug 2023 20:39)  Source: Pleural Fl Pleural Fluid  Preliminary Report (08 Aug 2023 06:48):    Testing in progress    Culture - Body Fluid with Gram Stain (collected 07 Aug 2023 20:39)  Source: Pleural Fl Pleural Fluid  Gram Stain (08 Aug 2023 02:47):    No polymorphonuclear leukocytes seen    No organisms seen    by cytocentrifuge      -----    TRENDS  Hemoglobin: 9.9 g/dL (08-08 @ 06:34)  Hemoglobin: 9.3 g/dL (08-07 @ 11:27)    Creatinine Trend: 0.88<--, 0.91<--  ----  RADIOLOGY & ADDITIONAL TESTS:  Results Reviewed:   Imaging Personally Reviewed:  Electrocardiogram Personally Reviewed:    COORDINATION OF CARE:  Care Discussed with Consultants/Other Providers [Y/N]:  Prior or Outpatient Records Reviewed [Y/N]:

## 2023-08-08 NOTE — PROGRESS NOTE ADULT - PROBLEM SELECTOR PLAN 1
GRIFFITH due to moderate pleural effusion.   s/p Emergent thoracentesis by Pulmonology; 1050mL removed. Simple appearing effusion.  Differential diagnosis includes CHF vs malignant pleural effusion vs inflammatory.   - Pt with irregular right 1.8 cm nodule that has been suspicious for lung cancer, but has been overall stable over last 3 years (previously was 1.5cm) per pulm note. Also has new left lung nodules on 6/7/23 CT  - Had left VATS with pleurodesis in 2020. Pleural effusion at that time negative for malignant cells.  - Pro-BNP elevated at 2257.   - Serum , Pleural . Serum Protein 7.2, Pleural Protein 3.5. Suggests transudative effusion  - ESR on outpatient labs 8/3 elevated to 108  - CRP on outpatient labs 8/3 elevated to 152  - Lasix 20mg IV BID x 4 dose then re-evaluate   - f/u thoracentesis fluid studies   - TTE ordered. TTE in 2021 with normal LVSF EF 60-65%, mildly dilated LA, mild AS, normal PAP - CHF vs malignant pleural effusion vs inflammatory. Working diagnosis is CHF given transudative effusion (/332, Protein 3.5/7.2, normal pH, glucose) and symptom improvement with diuresis.  - s/p Emergent thoracentesis by Pulmonology; 1050mL removed. Simple appearing effusion.  - Pt with irregular right 1.8 cm nodule that has been suspicious for lung cancer, but has been overall stable over last 3 years (previously was 1.5cm) per pulm note. Also has new left lung nodules on 6/7/23 CT.   - Had left VATS with pleurodesis in 2020. Pleural effusion at that time negative for malignant cells.  - Pro-BNP elevated at 2257.   - c/w Lasix 20mg IV BID (8/7-8/8)  - ESR on outpatient labs 8/3 elevated to 108  - CRP on outpatient labs 8/3 elevated to 152  - TTE ordered. TTE in 2021 with normal LVSF EF 60-65%, mildly dilated LA, mild AS, normal PAP

## 2023-08-08 NOTE — PROGRESS NOTE ADULT - PROBLEM SELECTOR PLAN 5
[id number] risk stratification score 3  EKG: Afib, no evidence of RVR    - On metoprolol succinate 50mg qhs  - Only on Aspirin  - Clarify with PCP why not on anticoagulation [id number] risk stratification score 3  EKG: Afib, no evidence of RVR    - On metoprolol succinate 50mg qhs  - Only on Aspirin  - States does not want AC as she has high tendency to bleed, patient aware that she is at higher risk for developing CVA with AF

## 2023-08-08 NOTE — PROGRESS NOTE ADULT - TIME BILLING
Time-based billing (NON-critical care).     55 minutes spent on total encounter; more than 50% of the visit was spent counseling and / or coordinating care by the attending physician.  The necessity of the time spent during the encounter on this date of service was due to:     documentation in [place], reviewing chart and coordinating care with patient/residents and interdisciplinary staff (such as , social workers, etc) as well as reviewing vitals, laboratory data, radiology, medication list, consultants' recommendations and prior records. Interventions were performed as documented above.

## 2023-08-08 NOTE — PROGRESS NOTE ADULT - SUBJECTIVE AND OBJECTIVE BOX
CHIEF COMPLAINT: sob    Interval Events: Patient seen and examined at bedside. No acute events overnight. Patient reports she feels much improved after fluid removal.     REVIEW OF SYSTEMS:  Constitutional: [X ] negative [ ] fevers [ ] chills [ ] weight loss [ ] weight gain  HEENT: [ X] negative [ ] dry eyes [ ] eye irritation [ ] postnasal drip [ ] nasal congestion  CV: [ X] negative  [ ] chest pain [ ] orthopnea [ ] palpitations [ ] murmur  Resp: [X ] negative [ ] cough [ ] shortness of breath [ ] dyspnea [ ] wheezing [ ] sputum [ ] hemoptysis  GI: [ X] negative [ ] nausea [ ] vomiting [ ] diarrhea [ ] constipation [ ] abd pain [ ] dysphagia   : [ X] negative [ ] dysuria [ ] nocturia [ ] hematuria [ ] increased urinary frequency  Musculoskeletal: [ ] negative [ ] back pain [ ] myalgias [ ] arthralgias [ ] fracture  Skin: [ ] negative [ ] rash [ ] itch  Neurological: [ ] negative [ ] headache [ ] dizziness [ ] syncope [ ] weakness [ ] numbness  Psychiatric: [ ] negative [ ] anxiety [ ] depression  Endocrine: [ ] negative [ ] diabetes [ ] thyroid problem  Hematologic/Lymphatic: [ ] negative [ ] anemia [ ] bleeding problem  Allergic/Immunologic: [ ] negative [ ] itchy eyes [ ] nasal discharge [ ] hives [ ] angioedema  [ ] All other systems negative  [ ] Unable to assess ROS because ________    OBJECTIVE:  ICU Vital Signs Last 24 Hrs  T(C): 36.8 (08 Aug 2023 05:07), Max: 37.7 (07 Aug 2023 11:52)  T(F): 98.2 (08 Aug 2023 05:07), Max: 99.9 (07 Aug 2023 11:52)  HR: 79 (08 Aug 2023 06:22) (76 - 97)  BP: 154/65 (08 Aug 2023 06:22) (126/79 - 177/77)  BP(mean): --  ABP: --  ABP(mean): --  RR: 17 (08 Aug 2023 06:22) (16 - 20)  SpO2: 97% (08 Aug 2023 06:22) (96% - 98%)    O2 Parameters below as of 08 Aug 2023 06:22  Patient On (Oxygen Delivery Method): room air              08-07 @ 07:01  -  08-08 @ 07:00  --------------------------------------------------------  IN: 250 mL / OUT: 1350 mL / NET: -1100 mL      CAPILLARY BLOOD GLUCOSE          PHYSICAL EXAM:  General: well appearing, NAD, sitting in bed  HEENT: no icterus  Neck: supple  Respiratory: CTABL  Cardiovascular: s1/s2, rrr  Abdomen:  soft, nontender  Extremities: no LE edema  Skin: no rashes  Neurological: AxOX3  Psychiatry: normal mood and affect    HOSPITAL MEDICATIONS:  aspirin  chewable 81 milliGRAM(s) Oral daily      furosemide   Injectable 20 milliGRAM(s) IV Push two times a day  metoprolol succinate ER 50 milliGRAM(s) Oral at bedtime  ramipril 10 milliGRAM(s) Oral at bedtime                          LABS:                        9.9    8.69  )-----------( 330      ( 08 Aug 2023 06:34 )             32.1     Hgb Trend: 9.9<--, 9.3<--  08-08    130<L>  |  93<L>  |  18  ----------------------------<  171<H>  4.0   |  22  |  0.88    Ca    8.7      08 Aug 2023 06:34  Phos  3.6     08-08  Mg     2.10     08-08    TPro  6.7  /  Alb  3.1<L>  /  TBili  0.4  /  DBili  x   /  AST  33<H>  /  ALT  31  /  AlkPhos  141<H>  08-08    Creatinine Trend: 0.88<--, 0.91<--  PT/INR - ( 07 Aug 2023 11:27 )   PT: 15.0 sec;   INR: 1.34 ratio         PTT - ( 07 Aug 2023 11:27 )  PTT:28.3 sec  Urinalysis Basic - ( 08 Aug 2023 06:34 )    Color: x / Appearance: x / SG: x / pH: x  Gluc: 171 mg/dL / Ketone: x  / Bili: x / Urobili: x   Blood: x / Protein: x / Nitrite: x   Leuk Esterase: x / RBC: x / WBC x   Sq Epi: x / Non Sq Epi: x / Bacteria: x            MICROBIOLOGY:     RADIOLOGY:  [ ] Reviewed and interpreted by me    PULMONARY FUNCTION TESTS:    EKG:

## 2023-08-09 ENCOUNTER — TRANSCRIPTION ENCOUNTER (OUTPATIENT)
Age: 88
End: 2023-08-09

## 2023-08-09 LAB
A1C WITH ESTIMATED AVERAGE GLUCOSE RESULT: 6.5 % — HIGH (ref 4–5.6)
ALBUMIN SERPL ELPH-MCNC: 2.9 G/DL — LOW (ref 3.3–5)
ALP SERPL-CCNC: 136 U/L — HIGH (ref 40–120)
ALT FLD-CCNC: 33 U/L — SIGNIFICANT CHANGE UP (ref 4–33)
ANA TITR SER: NEGATIVE — SIGNIFICANT CHANGE UP
ANION GAP SERPL CALC-SCNC: 13 MMOL/L — SIGNIFICANT CHANGE UP (ref 7–14)
ANION GAP SERPL CALC-SCNC: 9 MMOL/L — SIGNIFICANT CHANGE UP (ref 7–14)
AST SERPL-CCNC: 35 U/L — HIGH (ref 4–32)
BILIRUB SERPL-MCNC: 0.3 MG/DL — SIGNIFICANT CHANGE UP (ref 0.2–1.2)
BUN SERPL-MCNC: 21 MG/DL — SIGNIFICANT CHANGE UP (ref 7–23)
BUN SERPL-MCNC: 23 MG/DL — SIGNIFICANT CHANGE UP (ref 7–23)
CALCIUM SERPL-MCNC: 8.6 MG/DL — SIGNIFICANT CHANGE UP (ref 8.4–10.5)
CALCIUM SERPL-MCNC: 9 MG/DL — SIGNIFICANT CHANGE UP (ref 8.4–10.5)
CHLORIDE SERPL-SCNC: 91 MMOL/L — LOW (ref 98–107)
CHLORIDE SERPL-SCNC: 91 MMOL/L — LOW (ref 98–107)
CO2 SERPL-SCNC: 25 MMOL/L — SIGNIFICANT CHANGE UP (ref 22–31)
CO2 SERPL-SCNC: 27 MMOL/L — SIGNIFICANT CHANGE UP (ref 22–31)
CREAT ?TM UR-MCNC: 69 MG/DL — SIGNIFICANT CHANGE UP
CREAT SERPL-MCNC: 0.82 MG/DL — SIGNIFICANT CHANGE UP (ref 0.5–1.3)
CREAT SERPL-MCNC: 0.83 MG/DL — SIGNIFICANT CHANGE UP (ref 0.5–1.3)
EGFR: 67 ML/MIN/1.73M2 — SIGNIFICANT CHANGE UP
EGFR: 68 ML/MIN/1.73M2 — SIGNIFICANT CHANGE UP
ESTIMATED AVERAGE GLUCOSE: 140 — SIGNIFICANT CHANGE UP
GLUCOSE SERPL-MCNC: 116 MG/DL — HIGH (ref 70–99)
GLUCOSE SERPL-MCNC: 118 MG/DL — HIGH (ref 70–99)
HCT VFR BLD CALC: 31.3 % — LOW (ref 34.5–45)
HGB BLD-MCNC: 9.9 G/DL — LOW (ref 11.5–15.5)
HIV 1+2 AB+HIV1 P24 AG SERPL QL IA: SIGNIFICANT CHANGE UP
MAGNESIUM SERPL-MCNC: 2 MG/DL — SIGNIFICANT CHANGE UP (ref 1.6–2.6)
MCHC RBC-ENTMCNC: 23 PG — LOW (ref 27–34)
MCHC RBC-ENTMCNC: 31.6 GM/DL — LOW (ref 32–36)
MCV RBC AUTO: 72.8 FL — LOW (ref 80–100)
NRBC # BLD: 0 /100 WBCS — SIGNIFICANT CHANGE UP (ref 0–0)
NRBC # FLD: 0 K/UL — SIGNIFICANT CHANGE UP (ref 0–0)
OSMOLALITY UR: 451 MOSM/KG — SIGNIFICANT CHANGE UP (ref 50–1200)
PHOSPHATE SERPL-MCNC: 3.6 MG/DL — SIGNIFICANT CHANGE UP (ref 2.5–4.5)
PLATELET # BLD AUTO: 346 K/UL — SIGNIFICANT CHANGE UP (ref 150–400)
POTASSIUM SERPL-MCNC: 4 MMOL/L — SIGNIFICANT CHANGE UP (ref 3.5–5.3)
POTASSIUM SERPL-MCNC: 4.7 MMOL/L — SIGNIFICANT CHANGE UP (ref 3.5–5.3)
POTASSIUM SERPL-SCNC: 4 MMOL/L — SIGNIFICANT CHANGE UP (ref 3.5–5.3)
POTASSIUM SERPL-SCNC: 4.7 MMOL/L — SIGNIFICANT CHANGE UP (ref 3.5–5.3)
PROT ?TM UR-MCNC: 11 MG/DL — SIGNIFICANT CHANGE UP
PROT SERPL-MCNC: 6.4 G/DL — SIGNIFICANT CHANGE UP (ref 6–8.3)
PROT/CREAT UR-RTO: 0.2 RATIO — SIGNIFICANT CHANGE UP (ref 0–0.2)
RBC # BLD: 4.3 M/UL — SIGNIFICANT CHANGE UP (ref 3.8–5.2)
RBC # FLD: 16.7 % — HIGH (ref 10.3–14.5)
SODIUM SERPL-SCNC: 127 MMOL/L — LOW (ref 135–145)
SODIUM SERPL-SCNC: 129 MMOL/L — LOW (ref 135–145)
SODIUM UR-SCNC: 52 MMOL/L — SIGNIFICANT CHANGE UP
URATE UR-MCNC: 61.6 MG/DL — SIGNIFICANT CHANGE UP
UUN UR-MCNC: 731 MG/DL — SIGNIFICANT CHANGE UP
WBC # BLD: 8.51 K/UL — SIGNIFICANT CHANGE UP (ref 3.8–10.5)
WBC # FLD AUTO: 8.51 K/UL — SIGNIFICANT CHANGE UP (ref 3.8–10.5)

## 2023-08-09 PROCEDURE — 99232 SBSQ HOSP IP/OBS MODERATE 35: CPT | Mod: GC

## 2023-08-09 PROCEDURE — 99233 SBSQ HOSP IP/OBS HIGH 50: CPT | Mod: GC

## 2023-08-09 RX ORDER — FERROUS SULFATE 325(65) MG
1 TABLET ORAL
Qty: 0 | Refills: 0
Start: 2023-08-09

## 2023-08-09 RX ORDER — FUROSEMIDE 40 MG
1 TABLET ORAL
Qty: 0 | Refills: 0 | DISCHARGE
Start: 2023-08-09

## 2023-08-09 RX ORDER — FERROUS SULFATE 325(65) MG
1 TABLET ORAL
Qty: 0 | Refills: 0 | DISCHARGE
Start: 2023-08-09

## 2023-08-09 RX ORDER — FUROSEMIDE 40 MG
40 TABLET ORAL DAILY
Refills: 0 | Status: DISCONTINUED | OUTPATIENT
Start: 2023-08-10 | End: 2023-08-10

## 2023-08-09 RX ORDER — FUROSEMIDE 40 MG
1 TABLET ORAL
Qty: 30 | Refills: 0
Start: 2023-08-09 | End: 2023-09-07

## 2023-08-09 RX ORDER — FERROUS SULFATE 325(65) MG
1 TABLET ORAL
Qty: 30 | Refills: 0
Start: 2023-08-09 | End: 2023-09-07

## 2023-08-09 RX ORDER — FUROSEMIDE 40 MG
1 TABLET ORAL
Refills: 0 | DISCHARGE

## 2023-08-09 RX ORDER — FUROSEMIDE 40 MG
40 TABLET ORAL ONCE
Refills: 0 | Status: COMPLETED | OUTPATIENT
Start: 2023-08-09 | End: 2023-08-09

## 2023-08-09 RX ORDER — SODIUM CHLORIDE 9 MG/ML
1 INJECTION INTRAMUSCULAR; INTRAVENOUS; SUBCUTANEOUS THREE TIMES A DAY
Refills: 0 | Status: DISCONTINUED | OUTPATIENT
Start: 2023-08-09 | End: 2023-08-10

## 2023-08-09 RX ADMIN — SODIUM CHLORIDE 1 GRAM(S): 9 INJECTION INTRAMUSCULAR; INTRAVENOUS; SUBCUTANEOUS at 21:49

## 2023-08-09 RX ADMIN — Medication 40 MILLIGRAM(S): at 12:50

## 2023-08-09 RX ADMIN — Medication 325 MILLIGRAM(S): at 11:47

## 2023-08-09 RX ADMIN — Medication 10 MILLIGRAM(S): at 21:49

## 2023-08-09 RX ADMIN — Medication 1 TABLET(S): at 11:47

## 2023-08-09 RX ADMIN — Medication 81 MILLIGRAM(S): at 11:46

## 2023-08-09 RX ADMIN — Medication 50 MILLIGRAM(S): at 21:49

## 2023-08-09 NOTE — DISCHARGE NOTE PROVIDER - PROVIDER TOKENS
PROVIDER:[TOKEN:[[id number]],ESTABLISHEDPATIENT:[T]],PROVIDER:[TOKEN:[[id number]],ESTABLISHEDPATIENT:[T]]

## 2023-08-09 NOTE — PROGRESS NOTE ADULT - PROBLEM SELECTOR PLAN 5
[id number] risk stratification score 3  EKG: Afib, no evidence of RVR    - On metoprolol succinate 50mg qhs  - Only on Aspirin  - States does not want AC as she has high tendency to bleed, patient aware that she is at higher risk for developing CVA with AF

## 2023-08-09 NOTE — DISCHARGE NOTE PROVIDER - CARE PROVIDER_API CALL
Milton Rojas  Internal Medicine  [address] [place] [address], [address]  [place] [address]  Phone: [phone]  Fax: [phone]  Established Patient  Follow Up Time:     Bethel Chandler  Pulmonary Disease  [address] [place] [address] [address]  [place] [address]  Phone: [phone]  Fax: [phone]  Established Patient  Follow Up Time:

## 2023-08-09 NOTE — DISCHARGE NOTE PROVIDER - CARE PROVIDERS DIRECT ADDRESSES
[email or web address]

## 2023-08-09 NOTE — PROGRESS NOTE ADULT - ATTENDING COMMENTS
89yo Female with atrial fibrillation on aspirin, AS, lung nodules (stable), hx of pleural effusions, hypertension a/w GRIFFITH due to R moderate pleural effusion, s/p  thoracentesis with 1L fluid removed. Pleural fluid analysis with transudative effusion. Reported fever at home, here afebrile, no leukocytosis, pleural fluid gram stain negative. Elevated pro-BNP, hx of mild AS, afib, trace edema, with pleural effusion, TTE with concentric left ventricular remodeling, severe right atrial enlargement, right ventricular enlargement, moderate AS. Patient treated for CHF, HFpEF, transition to oral lasix today. Pleural fluid culture NGTD, pending cytology. Anemia- likely multifactorial, iron def and AOCD- started PO iron. Hypotonic hyponatremia- likely due to poor solute intake and CHF, worsening today- start fluid restriction and salt tabs, goal increase no more than 6meq in 24hrs. 89yo Female with atrial fibrillation on aspirin, AS, lung nodules (stable), hx of pleural effusions, hypertension a/w GRIFFITH due to R moderate pleural effusion, s/p  thoracentesis with 1L fluid removed. Pleural fluid analysis with transudative effusion. Reported fever at home, here afebrile, no leukocytosis, pleural fluid gram stain negative. Elevated pro-BNP, hx of mild AS, afib, trace edema, with pleural effusion, TTE with concentric left ventricular remodeling, severe right atrial enlargement, right ventricular enlargement, moderate AS. Patient treated for CHF, acute HFpEF, transition to oral lasix today. Pleural fluid culture NGTD, pending cytology. Anemia- likely multifactorial, iron def and AOCD- started PO iron. Hypotonic hyponatremia- likely due to poor solute intake and CHF, worsening today- start fluid restriction and salt tabs, goal increase no more than 6meq in 24hrs.

## 2023-08-09 NOTE — PROGRESS NOTE ADULT - ASSESSMENT
89-year-old female with past medical history of atrial fibrillation on aspirin, lung nodules (stable), pleural effusion  (s/p vats pleurodesis in 2020 on left), hypertension, presenting for worsening shortness of breath and intermittent fevers for the past few weeks.    #Pleural effusion;  - patient with right sided pleural effusion of unclear etiology  - CT scan from 8/4 (not read yet officially), shows moderate effusion and similar sized pulmonary nodule (which looks suspicious but has been followed over the past few years)  - pro BNP also elevated; s/p IV diuresis with improvement in dyspnea and LE edema  - bedside POCUS with simple appearing effusion on admission  - also reports fevers for past 2 weeks (log book shows fever ranging from 99 to 102.7)    Recommendations  - s/p thoracentesis with 1050mL removed of serous fluid   - post procedure CXR without PTX; patient feels much improved from a respiratory standpoint  - effusion is transudative without suggestion of infection; follow up cytopath  - infectious work up per primary team --> thus far unrevealing  - TTE with moderate AS and LV hypertrophy; effusion may be from heart failure as well ( given vats and pleurodesis on left, patient may present with unilateral effusion from HF)

## 2023-08-09 NOTE — DISCHARGE NOTE PROVIDER - HOSPITAL COURSE
HPI:  88yo Female with medical history of atrial fibrillation on aspirin, heart murmur, lung nodules (stable), pleural effusions, hypertension, presenting for worsening shortness of breath and intermittent fevers for the past 3 weeks, with t max at home of 102.7. Patient saw her pulmonologist outpatient, Dr. Bethel Chandler.  Pulmonologist sent patient into the ED due to worsening pleural effusions on CAT scan taken on Friday. Patient reports dyspnea on exertion, difficulty ambulating, generalized weakness. Patient denies chest pain, nausea/vomiting, abdominal pain.  She denies recent travel or tick bites. Dr. Chandler had increased her lasix from 20mg qd to 40mg qd on 8/4, and her urine output did not increase until today, 8/7. She also reports that she has been quite thirsty at home and has been drinking lots of water.     ED Course:  s/p 1050 mL thoracentesis by Pulmonology  Tmax 99  -177/69-82  HR afib 70's-90's.  On room air during examination    Her hospital course was uneventful as her symptom resolved after thoracentesis, infectious work up sent because of history of fever but were negative. TTE revealed findings likely represent HFpEF. Hospital course complicated by hyponatremia to 130 likely in the settings of poor oral intake and HFpEF. She was told to restrict fluid intake and encouraged to eat more protein, sodium remains stable throughout hospital stay. She is medically optimized for discharge.   HPI:  89yo Female with medical history of atrial fibrillation on aspirin, heart murmur, lung nodules (stable), pleural effusions, hypertension, presenting for worsening shortness of breath and intermittent fevers for the past 3 weeks, with t max at home of 102.7. Patient saw her pulmonologist outpatient, Dr. Bethel Chandler.  Pulmonologist sent patient into the ED due to worsening pleural effusions on CAT scan taken on Friday. Patient reports dyspnea on exertion, difficulty ambulating, generalized weakness. Patient denies chest pain, nausea/vomiting, abdominal pain.  She denies recent travel or tick bites. Dr. Chandler had increased her lasix from 20mg qd to 40mg qd on 8/4, and her urine output did not increase until today, 8/7. She also reports that she has been quite thirsty at home and has been drinking lots of water.     ED Course:  s/p 1050 mL thoracentesis by Pulmonology  Tmax 99  -177/69-82  HR afib 70's-90's.  On room air during examination    Her hospital course was uneventful as her symptom resolved after thoracentesis, infectious work up sent because of history of fever but were negative. TTE revealed findings likely represent HFpEF. Hospital course complicated by hyponatremia to 130 likely in the settings of poor oral intake and HFpEF. She was told to restrict fluid intake and encouraged to eat more protein, sodium remains stable throughout hospital stay. She is medically optimized for discharge.   HPI:  88yo Female with medical history of atrial fibrillation on aspirin, heart murmur, lung nodules (stable), pleural effusions, hypertension, presenting for worsening shortness of breath and intermittent fevers for the past 3 weeks, with t max at home of 102.7. Patient saw her pulmonologist outpatient, Dr. Bethel Chandler.  Pulmonologist sent patient into the ED due to worsening pleural effusions on CAT scan taken on Friday. Patient reports dyspnea on exertion, difficulty ambulating, generalized weakness. Patient denies chest pain, nausea/vomiting, abdominal pain.  She denies recent travel or tick bites. Dr. Chandler had increased her lasix from 20mg qd to 40mg qd on 8/4, and her urine output did not increase until today, 8/7. She also reports that she has been quite thirsty at home and has been drinking lots of water.     ED Course:  s/p 1050 mL thoracentesis by Pulmonology  Tmax 99  -177/69-82  HR afib 70's-90's.  On room air during examination    Her hospital course was uneventful as her symptom resolved after thoracentesis, infectious work up sent because of history of fever but were negative. Cytology from pleural fluid pending at the time of discharge. TTE revealed findings likely represent HFpEF. Hospital course complicated by hyponatremia to 130 likely in the settings of poor oral intake and HFpEF. She was told to restrict fluid intake and encouraged to eat more protein, sodium remains stable throughout hospital stay. She is medically optimized for discharge.

## 2023-08-09 NOTE — PROGRESS NOTE ADULT - ATTENDING COMMENTS
Right transudative pleural effusion likely due to acute CHF exacerbation. Doubt infected pleural effusion. Cultures negative so far. Follow-up pleural fluid cytopath and ADA. Continue diuresis with furosemide. Hyponatremia likely due to poor solute intake vs. hypervolemia, started on NaCl tabs.    Recent fevers of unclear etiology. Now resolved. UA negative, although urine culture with 10-49,000 E. coli. Blood cultures and pleural fluid cultures negative. Urine Legionella and RVP also negative. ESR and CRP elevated of unclear etiology. TTE with no suggestion of endocarditis. She has mild MR, moderate AS, mildly dilated LA, normal LV systolic function, severe MEGAN/RVE, normal TV, borderline pulmonary pressure (PASP 37), no pericardial effusion. Follow-up MEGAN, ANCA, and tick borne panel.    Also with known stable RLL nodule. She had repeat CT chest as outpatient performed on 8/4/23 with stable RLL 1.8 cm lung nodule, but the effusion is new.    D/c planning with close outpatient follow-up with Dr. Chandler. Right transudative pleural effusion likely due to acute CHF exacerbation. Doubt infected pleural effusion. Cultures negative so far. Follow-up pleural fluid cytopath and ADA. Continue diuresis with furosemide. Hyponatremia likely due to poor solute intake vs. hypervolemia, started on NaCl tabs.    Recent fevers of unclear etiology. Now resolved. UA negative, although urine culture with 10-49,000 E. coli. Blood cultures and pleural fluid cultures negative. Urine Legionella and RVP also negative. ESR and CRP elevated, but improved compared to recent outpatient values. TTE with no suggestion of endocarditis. She has mild MR, moderate AS, mildly dilated LA, normal LV systolic function, severe MEGAN/RVE, normal TV, borderline pulmonary pressure (PASP 37), no pericardial effusion. Follow-up MEGAN, ANCA, and tick borne panel.    Also with known stable RLL nodule. She had repeat CT chest as outpatient performed on 8/4/23 with stable RLL 1.8 cm lung nodule, but the effusion is new.    D/c planning with close outpatient follow-up with Dr. Chandler.

## 2023-08-09 NOTE — DISCHARGE NOTE PROVIDER - NSDCHHNEEDSERVICE_GEN_ALL_CORE
Medication teaching and assessment/Observation and assessment Rehabilitation services Rehabilitation services/Teaching and training

## 2023-08-09 NOTE — DISCHARGE NOTE PROVIDER - NSDCCPTREATMENT_GEN_ALL_CORE_FT
PRINCIPAL PROCEDURE  Procedure: Thoracentesis  Findings and Treatment: You received a thoracentesis where a needle was inserted into your pleural space to remove excessive fluid (pleural effusion). The procedure went well and no complication was noted after the procedure.

## 2023-08-09 NOTE — PROGRESS NOTE ADULT - PROBLEM SELECTOR PLAN 6
- c/w Metoprolol   - Hold Ramipril given s/p 1L thoracentesis and started on Lasix IV   - Monitor renal function - c/w Metoprolol  - c/w Ramipril  - c/w Lasix  - Monitor renal function

## 2023-08-09 NOTE — DISCHARGE NOTE PROVIDER - NSFOLLOWUPCLINICS_GEN_ALL_ED_FT
Cardiology at [facility]  Cardiology  [address] [place] [address]  Phone: [phone]  Fax:   Follow Up Time: 2 weeks

## 2023-08-09 NOTE — DISCHARGE NOTE PROVIDER - NSDCMRMEDTOKEN_GEN_ALL_CORE_FT
ascorbic acid 500 mg oral capsule: 2 cap(s) orally once a day  aspirin 81 mg oral tablet: 1 tab(s) orally once a day; last dose on 1/28/20  furosemide 40 mg oral tablet: 1 tab(s) orally once a day  metoprolol succinate 50 mg oral tablet, extended release: 1 orally once a day (at bedtime)  Multiple Vitamins oral capsule: 1 orally once a day  ramipril 10 mg oral capsule: 1 cap(s) orally once a day   ascorbic acid 500 mg oral capsule: 2 cap(s) orally once a day  aspirin 81 mg oral tablet: 1 tab(s) orally once a day; last dose on 1/28/20  ferrous sulfate 325 mg (65 mg elemental iron) oral tablet: 1 tab(s) orally once a day  furosemide 40 mg oral tablet: 1 tab(s) orally once a day  metoprolol succinate 50 mg oral tablet, extended release: 1 orally once a day (at bedtime)  Multiple Vitamins oral capsule: 1 orally once a day  ramipril 10 mg oral capsule: 1 cap(s) orally once a day

## 2023-08-09 NOTE — DISCHARGE NOTE PROVIDER - NSDCFUADDAPPT_GEN_ALL_CORE_FT
You may call [phone] to cardiology department at [facility] if you are interested in a second opinion from a different outpatient cardiologist.  You may call [phone] to cardiology department at [facility] if you are interested in a second opinion from a different outpatient cardiologist.     Please follow up with your primary care doctor for a repeat BMP within 1-2 weeks.

## 2023-08-09 NOTE — PROGRESS NOTE ADULT - ASSESSMENT
88yo Female with medical history of atrial fibrillation on aspirin, heart murmur,  lung nodules (stable), pleural effusions, hypertension a/w GRIFFITH due to R moderate pleural effusion, s/p emergent thoracentesis in ED, and FUO. Possible malignant effusion vs CHF exacerbation vs inflammatory process; 2 seconds or less

## 2023-08-09 NOTE — PROGRESS NOTE ADULT - SUBJECTIVE AND OBJECTIVE BOX
CHIEF COMPLAINT: sob    Interval Events: Patient seen and examined at bedside. No acute events overnight. reports some leg cramping.    REVIEW OF SYSTEMS:  Constitutional: [X ] negative [ ] fevers [ ] chills [ ] weight loss [ ] weight gain  HEENT: [ X] negative [ ] dry eyes [ ] eye irritation [ ] postnasal drip [ ] nasal congestion  CV: [ X] negative  [ ] chest pain [ ] orthopnea [ ] palpitations [ ] murmur  Resp: [X ] negative [ ] cough [ ] shortness of breath [ ] dyspnea [ ] wheezing [ ] sputum [ ] hemoptysis  GI: [X ] negative [ ] nausea [ ] vomiting [ ] diarrhea [ ] constipation [ ] abd pain [ ] dysphagia   : [X ] negative [ ] dysuria [ ] nocturia [ ] hematuria [ ] increased urinary frequency  Musculoskeletal: [ ]X negative [ ] back pain [ ] myalgias [ ] arthralgias [ ] fracture  Skin: [ ] negative [ ] rash [ ] itch  Neurological: [ ] negative [ ] headache [ ] dizziness [ ] syncope [ ] weakness [ ] numbness  Psychiatric: [ ] negative [ ] anxiety [ ] depression  Endocrine: [ ] negative [ ] diabetes [ ] thyroid problem  Hematologic/Lymphatic: [ ] negative [ ] anemia [ ] bleeding problem  Allergic/Immunologic: [ ] negative [ ] itchy eyes [ ] nasal discharge [ ] hives [ ] angioedema  [ ] All other systems negative  [ ] Unable to assess ROS because ________    OBJECTIVE:  ICU Vital Signs Last 24 Hrs  T(C): 36.7 (09 Aug 2023 05:20), Max: 37 (08 Aug 2023 20:53)  T(F): 98.1 (09 Aug 2023 05:20), Max: 98.6 (08 Aug 2023 20:53)  HR: 70 (09 Aug 2023 05:20) (70 - 86)  BP: 142/79 (09 Aug 2023 05:20) (131/69 - 176/81)  BP(mean): --  ABP: --  ABP(mean): --  RR: 17 (09 Aug 2023 05:20) (17 - 18)  SpO2: 97% (09 Aug 2023 05:20) (95% - 100%)    O2 Parameters below as of 09 Aug 2023 05:20  Patient On (Oxygen Delivery Method): room air              08-08 @ 07:01  -  08-09 @ 07:00  --------------------------------------------------------  IN: 0 mL / OUT: 950 mL / NET: -950 mL      CAPILLARY BLOOD GLUCOSE          PHYSICAL EXAM:  General: well appearing, NAD, sitting in bed  HEENT: no icterus  Neck: supple  Respiratory: CTABL  Cardiovascular: s1/s2, rrr  Abdomen:  soft, nontender  Extremities: no LE edema  Skin: no rashes  Neurological: AxOX3  Psychiatry: normal mood and affect    HOSPITAL MEDICATIONS:  aspirin  chewable 81 milliGRAM(s) Oral daily      metoprolol succinate ER 50 milliGRAM(s) Oral at bedtime  ramipril 10 milliGRAM(s) Oral at bedtime                ferrous    sulfate 325 milliGRAM(s) Oral daily  multivitamin 1 Tablet(s) Oral daily            LABS:                        9.9    8.69  )-----------( 330      ( 08 Aug 2023 06:34 )             32.1     Hgb Trend: 9.9<--, 9.3<--  08-08    130<L>  |  93<L>  |  18  ----------------------------<  171<H>  4.0   |  22  |  0.88    Ca    8.7      08 Aug 2023 06:34  Phos  3.6     08-08  Mg     2.10     08-08    TPro  6.7  /  Alb  3.1<L>  /  TBili  0.4  /  DBili  x   /  AST  33<H>  /  ALT  31  /  AlkPhos  141<H>  08-08    Creatinine Trend: 0.88<--, 0.91<--  PT/INR - ( 07 Aug 2023 11:27 )   PT: 15.0 sec;   INR: 1.34 ratio         PTT - ( 07 Aug 2023 11:27 )  PTT:28.3 sec  Urinalysis Basic - ( 08 Aug 2023 06:34 )    Color: x / Appearance: x / SG: x / pH: x  Gluc: 171 mg/dL / Ketone: x  / Bili: x / Urobili: x   Blood: x / Protein: x / Nitrite: x   Leuk Esterase: x / RBC: x / WBC x   Sq Epi: x / Non Sq Epi: x / Bacteria: x            MICROBIOLOGY:     RADIOLOGY:  [ ] Reviewed and interpreted by me    PULMONARY FUNCTION TESTS:    EKG:

## 2023-08-09 NOTE — PROGRESS NOTE ADULT - PROBLEM SELECTOR PLAN 1
- CHF vs malignant pleural effusion vs inflammatory. Working diagnosis is CHF given transudative effusion (/332, Protein 3.5/7.2, normal pH, glucose) and symptom improvement with diuresis.  - s/p Emergent thoracentesis by Pulmonology; 1050mL removed. Simple appearing effusion.  - Pt with irregular right 1.8 cm nodule that has been suspicious for lung cancer, but has been overall stable over last 3 years (previously was 1.5cm) per pulm note. Also has new left lung nodules on 6/7/23 CT.   - Had left VATS with pleurodesis in 2020. Pleural effusion at that time negative for malignant cells.  - Pro-BNP elevated at 2257.   - s/p Lasix 20mg IV BID (8/7-8/8)  - ESR on outpatient labs 8/3 elevated to 108  - CRP on outpatient labs 8/3 elevated to 152  - TTE ordered. TTE in 2021 with normal LVSF EF 60-65%, mildly dilated LA, mild AS, normal PAP - CHF vs malignant pleural effusion vs inflammatory. Working diagnosis is CHF given transudative effusion (/332, Protein 3.5/7.2, normal pH, glucose) and symptom improvement with diuresis.  - s/p Emergent thoracentesis by Pulmonology; 1050mL removed. Simple appearing effusion.  - Pt with irregular right 1.8 cm nodule that has been suspicious for lung cancer, but has been overall stable over last 3 years (previously was 1.5cm) per pulm note. Also has new left lung nodules on 6/7/23 CT.   - Had left VATS with pleurodesis in 2020. Pleural effusion at that time negative for malignant cells.  - Pro-BNP elevated at 2257.   - s/p Lasix 20mg IV BID (8/7-8/8) with symptom improvement  - ESR on outpatient labs 8/3 elevated to 108  - CRP on outpatient labs 8/3 elevated to 152  - TTE (8/8) showed LV concentric remodeling,   - TTE ordered. TTE in 2021 with normal LVSF EF 60-65%, mildly dilated LA, mild AS, normal PAP - CHF vs malignant pleural effusion vs inflammatory. Working diagnosis is CHF given transudative effusion (/332, Protein 3.5/7.2, normal pH, glucose) and symptom improvement with diuresis.  - s/p Emergent thoracentesis by Pulmonology; 1050mL removed. Simple appearing effusion.  - Pt with irregular right 1.8 cm nodule that has been suspicious for lung cancer, but has been overall stable over last 3 years (previously was 1.5cm) per pulm note. Also has new left lung nodules on 6/7/23 CT.   - Had left VATS with pleurodesis in 2020. Pleural effusion at that time negative for malignant cells.  - Pro-BNP elevated at 2257.   - s/p Lasix 20mg IV BID (8/7-8/8) with symptom improvement  - ESR on outpatient labs 8/3 elevated to 108  - CRP on outpatient labs 8/3 elevated to 152  - TTE (8/8) showed LV concentric remodeling, normal LVSF EF 60-65%, mildly dilated LA, borderline PASP  - Likely has HFpEF given high H2FPEF score (>=6) and elevated BNP, will need medication adjustment outpatient.  - c/w Lasix 40mg QD

## 2023-08-09 NOTE — DISCHARGE NOTE NURSING/CASE MANAGEMENT/SOCIAL WORK - NSDCFUADDAPPT_GEN_ALL_CORE_FT
You may call [phone] to cardiology department at [facility] if you are interested in a second opinion from a different outpatient cardiologist.     Please follow up with your primary care doctor for a repeat BMP within 1-2 weeks.

## 2023-08-09 NOTE — DISCHARGE NOTE NURSING/CASE MANAGEMENT/SOCIAL WORK - NSDCPEFALRISK_GEN_ALL_CORE
For information on Fall & Injury Prevention, visit: [email or web address] OR  [email or web address] OR  https://www.cdc.gov/steadi/patient.html

## 2023-08-09 NOTE — DISCHARGE NOTE NURSING/CASE MANAGEMENT/SOCIAL WORK - NSSCTYPOFSERV_GEN_ALL_CORE
RN/PT services. Nurse to visit 24-48 hours from discharge. Nurse to call prior to visit to arrange. Physical therapy to follow.

## 2023-08-09 NOTE — DISCHARGE NOTE NURSING/CASE MANAGEMENT/SOCIAL WORK - PATIENT PORTAL LINK FT
You can access the FollowMyHealth Patient Portal offered by [facility] by registering at the following website: [email or web address]. By joining [employer]s FollowMyHealth portal, you will also be able to view your health information using other applications (apps) compatible with our system.

## 2023-08-09 NOTE — PROGRESS NOTE ADULT - SUBJECTIVE AND OBJECTIVE BOX
PROGRESS NOTE:     Patient is a 89y old  Female who presents with a chief complaint of SOB (08 Aug 2023 09:16)      SUBJECTIVE / OVERNIGHT EVENTS:    ADDITIONAL REVIEW OF SYSTEMS: 10 point ROS negative except per HPI    MEDICATIONS  (STANDING):  aspirin  chewable 81 milliGRAM(s) Oral daily  ferrous    sulfate 325 milliGRAM(s) Oral daily  metoprolol succinate ER 50 milliGRAM(s) Oral at bedtime  multivitamin 1 Tablet(s) Oral daily  ramipril 10 milliGRAM(s) Oral at bedtime    MEDICATIONS  (PRN):      CAPILLARY BLOOD GLUCOSE        I&O's Summary    08 Aug 2023 07:01  -  09 Aug 2023 07:00  --------------------------------------------------------  IN: 0 mL / OUT: 950 mL / NET: -950 mL        PHYSICAL EXAM:  Vital Signs Last 24 Hrs  T(C): 36.7 (09 Aug 2023 05:20), Max: 37 (08 Aug 2023 20:53)  T(F): 98.1 (09 Aug 2023 05:20), Max: 98.6 (08 Aug 2023 20:53)  HR: 70 (09 Aug 2023 05:20) (70 - 86)  BP: 142/79 (09 Aug 2023 05:20) (131/69 - 176/81)  BP(mean): --  RR: 17 (09 Aug 2023 05:20) (17 - 18)  SpO2: 97% (09 Aug 2023 05:20) (95% - 100%)    Parameters below as of 09 Aug 2023 05:20  Patient On (Oxygen Delivery Method): room air        CONSTITUTIONAL: NAD, well-developed, A&Ox3 to person, place, time.  RESPIRATORY: Normal respiratory effort; lungs are clear to auscultation bilaterally  CARDIOVASCULAR: Regular rate and rhythm, normal S1 and S2, no murmur/rub/gallop; No lower extremity edema; Peripheral pulses are 2+ bilaterally  ABDOMEN: Nontender to palpation, no rebound/guarding; No hepatosplenomegaly  MUSCLOSKELETAL: no clubbing or cyanosis of digits; no joint swelling or tenderness to palpation  NEURO: CN 2-12 grossly intact, moves all limbs spontaneously      LABS:                          9.9    8.69  )-----------( 330      ( 08 Aug 2023 06:34 )             32.1     08-08    130<L>  |  93<L>  |  18  ----------------------------<  171<H>  4.0   |  22  |  0.88    Ca    8.7      08 Aug 2023 06:34  Phos  3.6     08-08  Mg     2.10     08-08    TPro  6.7  /  Alb  3.1<L>  /  TBili  0.4  /  DBili  x   /  AST  33<H>  /  ALT  31  /  AlkPhos  141<H>  08-08    PT/INR - ( 07 Aug 2023 11:27 )   PT: 15.0 sec;   INR: 1.34 ratio         PTT - ( 07 Aug 2023 11:27 )  PTT:28.3 sec      -----    MICRO  Urinalysis Basic - ( 08 Aug 2023 06:34 )    Color: x / Appearance: x / SG: x / pH: x  Gluc: 171 mg/dL / Ketone: x  / Bili: x / Urobili: x   Blood: x / Protein: x / Nitrite: x   Leuk Esterase: x / RBC: x / WBC x   Sq Epi: x / Non Sq Epi: x / Bacteria: x        Culture - Acid Fast - Body Fluid w/Smear (collected 07 Aug 2023 20:39)  Source: Pleural Fl Pleural Fluid    Culture - Fungal, Body Fluid (collected 07 Aug 2023 20:39)  Source: Pleural Fl Pleural Fluid  Preliminary Report (08 Aug 2023 06:48):    Testing in progress    Culture - Body Fluid with Gram Stain (collected 07 Aug 2023 20:39)  Source: Pleural Fl Pleural Fluid  Gram Stain (08 Aug 2023 02:47):    No polymorphonuclear leukocytes seen    No organisms seen    by cytocentrifuge  Preliminary Report (08 Aug 2023 22:25):    No growth    Culture - Urine (collected 07 Aug 2023 18:28)  Source: Clean Catch Clean Catch (Midstream)  Preliminary Report (09 Aug 2023 07:18):    10,000 - 49,000 CFU/mL Escherichia coli    Culture - Blood (collected 07 Aug 2023 11:53)  Source: .Blood Blood-Peripheral  Preliminary Report (08 Aug 2023 17:01):    No growth at 24 hours    Culture - Blood (collected 07 Aug 2023 11:48)  Source: .Blood Blood-Peripheral  Preliminary Report (08 Aug 2023 17:01):    No growth at 24 hours      -----    TRENDS  Hemoglobin: 9.9 g/dL (08-08 @ 06:34)  Hemoglobin: 9.3 g/dL (08-07 @ 11:27)    Creatinine Trend: 0.88<--, 0.91<--  ----  RADIOLOGY & ADDITIONAL TESTS:  Results Reviewed:   Imaging Personally Reviewed:  Electrocardiogram Personally Reviewed:    COORDINATION OF CARE:  Care Discussed with Consultants/Other Providers [Y/N]:  Prior or Outpatient Records Reviewed [Y/N]:   PROGRESS NOTE:     Patient is a 89y old  Female who presents with a chief complaint of SOB (08 Aug 2023 09:16)      SUBJECTIVE / OVERNIGHT EVENTS: No acute event overnight, s/p diuresis with symptom improvement. Still c/o mild bilateral lower extremities cramping, but denies tingling/numbness/weakness. Denies fever, dizziness, chest pain, SOB, n/v/d, dysuria.     ADDITIONAL REVIEW OF SYSTEMS: 10 point ROS negative except per HPI    MEDICATIONS  (STANDING):  aspirin  chewable 81 milliGRAM(s) Oral daily  ferrous    sulfate 325 milliGRAM(s) Oral daily  metoprolol succinate ER 50 milliGRAM(s) Oral at bedtime  multivitamin 1 Tablet(s) Oral daily  ramipril 10 milliGRAM(s) Oral at bedtime    MEDICATIONS  (PRN):      CAPILLARY BLOOD GLUCOSE        I&O's Summary    08 Aug 2023 07:01  -  09 Aug 2023 07:00  --------------------------------------------------------  IN: 0 mL / OUT: 950 mL / NET: -950 mL        PHYSICAL EXAM:  Vital Signs Last 24 Hrs  T(C): 36.7 (09 Aug 2023 05:20), Max: 37 (08 Aug 2023 20:53)  T(F): 98.1 (09 Aug 2023 05:20), Max: 98.6 (08 Aug 2023 20:53)  HR: 70 (09 Aug 2023 05:20) (70 - 86)  BP: 142/79 (09 Aug 2023 05:20) (131/69 - 176/81)  BP(mean): --  RR: 17 (09 Aug 2023 05:20) (17 - 18)  SpO2: 97% (09 Aug 2023 05:20) (95% - 100%)    Parameters below as of 09 Aug 2023 05:20  Patient On (Oxygen Delivery Method): room air        CONSTITUTIONAL: NAD, well-developed, A&Ox3 to person, place, time.  RESPIRATORY: Normal respiratory effort; lungs are clear to auscultation bilaterally  CARDIOVASCULAR: Regular rate and rhythm, normal S1 and S2, no murmur/rub/gallop; No lower extremity edema; Peripheral pulses are 2+ bilaterally  ABDOMEN: Nontender to palpation, no rebound/guarding; No hepatosplenomegaly  MUSCLOSKELETAL: no clubbing or cyanosis of digits; no joint swelling or tenderness to palpation  NEURO: CN 2-12 grossly intact, moves all limbs spontaneously      LABS:                          9.9    8.69  )-----------( 330      ( 08 Aug 2023 06:34 )             32.1     08-08    130<L>  |  93<L>  |  18  ----------------------------<  171<H>  4.0   |  22  |  0.88    Ca    8.7      08 Aug 2023 06:34  Phos  3.6     08-08  Mg     2.10     08-08    TPro  6.7  /  Alb  3.1<L>  /  TBili  0.4  /  DBili  x   /  AST  33<H>  /  ALT  31  /  AlkPhos  141<H>  08-08    PT/INR - ( 07 Aug 2023 11:27 )   PT: 15.0 sec;   INR: 1.34 ratio         PTT - ( 07 Aug 2023 11:27 )  PTT:28.3 sec      -----    MICRO  Urinalysis Basic - ( 08 Aug 2023 06:34 )    Color: x / Appearance: x / SG: x / pH: x  Gluc: 171 mg/dL / Ketone: x  / Bili: x / Urobili: x   Blood: x / Protein: x / Nitrite: x   Leuk Esterase: x / RBC: x / WBC x   Sq Epi: x / Non Sq Epi: x / Bacteria: x        Culture - Acid Fast - Body Fluid w/Smear (collected 07 Aug 2023 20:39)  Source: Pleural Fl Pleural Fluid    Culture - Fungal, Body Fluid (collected 07 Aug 2023 20:39)  Source: Pleural Fl Pleural Fluid  Preliminary Report (08 Aug 2023 06:48):    Testing in progress    Culture - Body Fluid with Gram Stain (collected 07 Aug 2023 20:39)  Source: Pleural Fl Pleural Fluid  Gram Stain (08 Aug 2023 02:47):    No polymorphonuclear leukocytes seen    No organisms seen    by cytocentrifuge  Preliminary Report (08 Aug 2023 22:25):    No growth    Culture - Urine (collected 07 Aug 2023 18:28)  Source: Clean Catch Clean Catch (Midstream)  Preliminary Report (09 Aug 2023 07:18):    10,000 - 49,000 CFU/mL Escherichia coli    Culture - Blood (collected 07 Aug 2023 11:53)  Source: .Blood Blood-Peripheral  Preliminary Report (08 Aug 2023 17:01):    No growth at 24 hours    Culture - Blood (collected 07 Aug 2023 11:48)  Source: .Blood Blood-Peripheral  Preliminary Report (08 Aug 2023 17:01):    No growth at 24 hours      -----    TRENDS  Hemoglobin: 9.9 g/dL (08-08 @ 06:34)  Hemoglobin: 9.3 g/dL (08-07 @ 11:27)    Creatinine Trend: 0.88<--, 0.91<--  ----  RADIOLOGY & ADDITIONAL TESTS:  Results Reviewed:   Imaging Personally Reviewed:  Electrocardiogram Personally Reviewed:    COORDINATION OF CARE:  Care Discussed with Consultants/Other Providers [Y/N]:  Prior or Outpatient Records Reviewed [Y/N]:

## 2023-08-09 NOTE — PROVIDER CONTACT NOTE (OTHER) - ACTION/TREATMENT ORDERED:
MD Dodson made aware.  Nursing care to continue MD Dodson made aware. Continue sequential compression device. Nursing care to continue

## 2023-08-09 NOTE — PROGRESS NOTE ADULT - PROBLEM SELECTOR PLAN 2
- Unclear etiology, malignant pleural effusion vs inflammatory  - ESR and CRP severely elevated, as above  - Will send tick borne panel  - f/u blood culture, urine culture, pleural fluid culture  - f/u autoimmune workup MEGAN, ANCA, HIV  - Given no white count would monitor off of Abx for now  - If spikes fever, would start Ceftriaxone and Azithromycin

## 2023-08-09 NOTE — DISCHARGE NOTE PROVIDER - NSDCCPCAREPLAN_GEN_ALL_CORE_FT
PRINCIPAL DISCHARGE DIAGNOSIS  Diagnosis: Heart failure with preserved ejection fraction  Assessment and Plan of Treatment: During hospital stay, you received transthoracic echocardiogram (TTE) which showed evidence of heart failure with preserved ejection fraction (HFpEF), you were treated with Furosemide (diuretics) to help remove excessive water from you body. Please continue to take Furosemide 40mg once daily and follow up with your cardiologist after discharge.      SECONDARY DISCHARGE DIAGNOSES  Diagnosis: Pleural effusion  Assessment and Plan of Treatment: You were found to have right sided pleural effusion when you came to the hospital, which is fluid build up around the lungs. You received thoracentesis, a procedure where a needle was inserted into your pleural space to remove the fluid. You were also given Furosemide (diuretics) to facilitate fluid removal. Please continue to take Furosemide 40mg once daily and follow up with your pulmonologist after discharge.

## 2023-08-09 NOTE — PROGRESS NOTE ADULT - PROBLEM SELECTOR PLAN 3
- Pt had normal hgb in 2021. Now with microtic anemia, Hgb 9.8, iron 19, Ferritin 275, TIBC 195  - Likely mixed iron deficiency anemia and anemia of chronic disease given iron study  - Was previously up to date on colonoscopy before aging out  - Reports no dark or bloody stools  - start ferrous sulfate 325 qd  - ctm

## 2023-08-10 VITALS
TEMPERATURE: 98 F | HEART RATE: 77 BPM | RESPIRATION RATE: 17 BRPM | DIASTOLIC BLOOD PRESSURE: 62 MMHG | OXYGEN SATURATION: 98 % | SYSTOLIC BLOOD PRESSURE: 150 MMHG

## 2023-08-10 LAB
-  AMIKACIN: SIGNIFICANT CHANGE UP
-  AMOXICILLIN/CLAVULANIC ACID: SIGNIFICANT CHANGE UP
-  AMPICILLIN/SULBACTAM: SIGNIFICANT CHANGE UP
-  AMPICILLIN: SIGNIFICANT CHANGE UP
-  AZTREONAM: SIGNIFICANT CHANGE UP
-  CEFAZOLIN: SIGNIFICANT CHANGE UP
-  CEFEPIME: SIGNIFICANT CHANGE UP
-  CEFOXITIN: SIGNIFICANT CHANGE UP
-  CEFTRIAXONE: SIGNIFICANT CHANGE UP
-  CEFUROXIME: SIGNIFICANT CHANGE UP
-  CIPROFLOXACIN: SIGNIFICANT CHANGE UP
-  ERTAPENEM: SIGNIFICANT CHANGE UP
-  GENTAMICIN: SIGNIFICANT CHANGE UP
-  IMIPENEM: SIGNIFICANT CHANGE UP
-  LEVOFLOXACIN: SIGNIFICANT CHANGE UP
-  MEROPENEM: SIGNIFICANT CHANGE UP
-  NITROFURANTOIN: SIGNIFICANT CHANGE UP
-  PIPERACILLIN/TAZOBACTAM: SIGNIFICANT CHANGE UP
-  TOBRAMYCIN: SIGNIFICANT CHANGE UP
-  TRIMETHOPRIM/SULFAMETHOXAZOLE: SIGNIFICANT CHANGE UP
A PHAGOCYTOPH IGG TITR SER IF: SIGNIFICANT CHANGE UP TITER
ADENOSINE DEAMINASE PLR-CCNC: 4 U/L — SIGNIFICANT CHANGE UP (ref 0–30)
ANION GAP SERPL CALC-SCNC: 12 MMOL/L — SIGNIFICANT CHANGE UP (ref 7–14)
AUTO DIFF PNL BLD: ABNORMAL
B BURGDOR AB SER QL IA: NEGATIVE — SIGNIFICANT CHANGE UP
B MICROTI IGG TITR SER: SIGNIFICANT CHANGE UP TITER
BUN SERPL-MCNC: 25 MG/DL — HIGH (ref 7–23)
C-ANCA SER-ACNC: NEGATIVE — SIGNIFICANT CHANGE UP
CALCIUM SERPL-MCNC: 8.7 MG/DL — SIGNIFICANT CHANGE UP (ref 8.4–10.5)
CHLORIDE SERPL-SCNC: 93 MMOL/L — LOW (ref 98–107)
CO2 SERPL-SCNC: 23 MMOL/L — SIGNIFICANT CHANGE UP (ref 22–31)
CREAT SERPL-MCNC: 0.82 MG/DL — SIGNIFICANT CHANGE UP (ref 0.5–1.3)
E CHAFFEENSIS IGG TITR SER IF: SIGNIFICANT CHANGE UP TITER
EGFR: 68 ML/MIN/1.73M2 — SIGNIFICANT CHANGE UP
GLUCOSE SERPL-MCNC: 116 MG/DL — HIGH (ref 70–99)
HCT VFR BLD CALC: 32.7 % — LOW (ref 34.5–45)
HGB BLD-MCNC: 9.8 G/DL — LOW (ref 11.5–15.5)
MAGNESIUM SERPL-MCNC: 2.1 MG/DL — SIGNIFICANT CHANGE UP (ref 1.6–2.6)
MCHC RBC-ENTMCNC: 23 PG — LOW (ref 27–34)
MCHC RBC-ENTMCNC: 30 GM/DL — LOW (ref 32–36)
MCV RBC AUTO: 76.6 FL — LOW (ref 80–100)
METHOD TYPE: SIGNIFICANT CHANGE UP
MPO AB + PR3 PNL SER: SIGNIFICANT CHANGE UP
NRBC # BLD: 0 /100 WBCS — SIGNIFICANT CHANGE UP (ref 0–0)
NRBC # FLD: 0 K/UL — SIGNIFICANT CHANGE UP (ref 0–0)
OSMOLALITY SERPL: 276 MOSM/KG — SIGNIFICANT CHANGE UP (ref 275–295)
P-ANCA SER-ACNC: NEGATIVE — SIGNIFICANT CHANGE UP
PHOSPHATE SERPL-MCNC: 3.7 MG/DL — SIGNIFICANT CHANGE UP (ref 2.5–4.5)
PLATELET # BLD AUTO: 317 K/UL — SIGNIFICANT CHANGE UP (ref 150–400)
POTASSIUM SERPL-MCNC: 4.6 MMOL/L — SIGNIFICANT CHANGE UP (ref 3.5–5.3)
POTASSIUM SERPL-SCNC: 4.6 MMOL/L — SIGNIFICANT CHANGE UP (ref 3.5–5.3)
RBC # BLD: 4.27 M/UL — SIGNIFICANT CHANGE UP (ref 3.8–5.2)
RBC # FLD: 17.1 % — HIGH (ref 10.3–14.5)
SODIUM SERPL-SCNC: 128 MMOL/L — LOW (ref 135–145)
WBC # BLD: 9.18 K/UL — SIGNIFICANT CHANGE UP (ref 3.8–10.5)
WBC # FLD AUTO: 9.18 K/UL — SIGNIFICANT CHANGE UP (ref 3.8–10.5)

## 2023-08-10 PROCEDURE — 99233 SBSQ HOSP IP/OBS HIGH 50: CPT | Mod: GC

## 2023-08-10 PROCEDURE — 99239 HOSP IP/OBS DSCHRG MGMT >30: CPT | Mod: GC

## 2023-08-10 RX ORDER — FERROUS SULFATE 325(65) MG
1 TABLET ORAL
Qty: 30 | Refills: 0
Start: 2023-08-10 | End: 2023-09-08

## 2023-08-10 RX ORDER — FUROSEMIDE 40 MG
1 TABLET ORAL
Qty: 30 | Refills: 0
Start: 2023-08-10 | End: 2023-09-08

## 2023-08-10 RX ORDER — FERROUS SULFATE 325(65) MG
1 TABLET ORAL
Qty: 30 | Refills: 0 | DISCHARGE
Start: 2023-08-10 | End: 2023-09-08

## 2023-08-10 RX ADMIN — Medication 1 TABLET(S): at 11:48

## 2023-08-10 RX ADMIN — SODIUM CHLORIDE 1 GRAM(S): 9 INJECTION INTRAMUSCULAR; INTRAVENOUS; SUBCUTANEOUS at 05:56

## 2023-08-10 RX ADMIN — Medication 40 MILLIGRAM(S): at 05:56

## 2023-08-10 RX ADMIN — Medication 81 MILLIGRAM(S): at 11:47

## 2023-08-10 RX ADMIN — Medication 325 MILLIGRAM(S): at 11:47

## 2023-08-10 NOTE — PROGRESS NOTE ADULT - ATTENDING COMMENTS
1. Right transudative pleural effusion likely due to acute CHF exacerbation. S/p drainage of 1050 mL on admission. Pleural fluid cultures negative. Adenosine deaminase is 4, which essentially rules out TB. Cytopathology negative for malignancy, GMS and AFB stains negative. Continue diuresis with furosemide.     2. Stable RLL nodule. She had repeat CT chest as outpatient performed on 8/4/23 with stable RLL 1.8 cm lung nodule. Doubt malignancy as nodule has been present since 2019 and has not significantly changed in size.    3. Recent fevers of unclear etiology. Now resolved. UA negative, although urine culture with 10-49,000 pan-sensitive E. coli. Blood cultures and pleural fluid cultures negative. Urine Legionella and RVP also negative. ESR and CRP elevated, but improved compared to recent outpatient values. Possible recent viral URI. TTE with no suggestion of endocarditis. She has mild MR, moderate AS, mildly dilated LA, normal LV systolic function, severe MEGAN/RVE, normal TV, borderline pulmonary pressure (PASP 37), no pericardial effusion. MEGAN, ANCA, and tick borne panel negative.     4. Hyponatremia likely due to poor solute intake vs. hypervolemia. Asymptomatic. Hyponatremia stable. On NaCl tabs. Restrict fluid intake. Continue furosemide.    5. Stable for discharge today with close outpatient follow-up with Dr. Chandler.

## 2023-08-10 NOTE — PROVIDER CONTACT NOTE (OTHER) - SITUATION
Patient left with transport team   Son took all of patient's belongings  Faxed over AVS to facility  Report given to facility 
Patient with elevated blood pressure
Patient states she has a cramp on right leg
Patient with elevated blood pressure
Patients blood pressure pose Lasix po 157/59 heart rate 72
Lasix 20mg IV push order for two times a day, administered early this morning as it populated in EMAR for 6am

## 2023-08-10 NOTE — PROGRESS NOTE ADULT - PROBLEM SELECTOR PLAN 5
[id number] risk stratification score 3  EKG: Afib, no evidence of RVR    - On metoprolol succinate 50mg qhs  - Only on Aspirin  - States does not want AC as she has high tendency to bleed, patient aware that she is at higher risk for developing CVA with AF

## 2023-08-10 NOTE — PROGRESS NOTE ADULT - ASSESSMENT
88yo Female with medical history of atrial fibrillation on aspirin, heart murmur,  lung nodules (stable), pleural effusions, hypertension a/w GRIFFITH due to R moderate pleural effusion, s/p emergent thoracentesis in ED, and FUO. Possible malignant effusion vs CHF exacerbation vs inflammatory process;

## 2023-08-10 NOTE — PROVIDER CONTACT NOTE (OTHER) - RECOMMENDATIONS
MD to be notified
MAR to be made aware, to repeat blood pressure in an hour
MD to be notified, and to repeat blood pressure in an hour
MD to be notified
MD to be notified, administer due lasix

## 2023-08-10 NOTE — PROGRESS NOTE ADULT - ASSESSMENT
89-year-old female with past medical history of atrial fibrillation on aspirin, lung nodules (stable), pleural effusion  (s/p vats pleurodesis in 2020 on left), hypertension, presenting for worsening shortness of breath and intermittent fevers for the past few weeks.    #Pleural effusion;  - patient with right sided pleural effusion of unclear etiology  - CT scan from 8/4 (not read yet officially), shows moderate effusion and similar sized pulmonary nodule (which looks suspicious but has been followed over the past few years)  - pro BNP also elevated; s/p IV diuresis with improvement in dyspnea and LE edema  - bedside POCUS with simple appearing effusion on admission  - also reports fevers for past 2 weeks (log book shows fever ranging from 99 to 102.7)    Recommendations  - s/p thoracentesis with 1050mL removed of serous fluid   - post procedure CXR without PTX; patient feels much improved from a respiratory standpoint  - effusion is transudative without suggestion of infection; follow up cytopath  - infectious work up per primary team --> thus far unrevealing  - TTE with moderate AS and LV hypertrophy; effusion may be from heart failure as well ( given vats and pleurodesis on left, patient may present with unilateral effusion from HF)  - hyponatremia noted on labwork--> patient with increased fluid intake however urine osm is elevated more consistent with SIADH vs polydupsia

## 2023-08-10 NOTE — PROGRESS NOTE ADULT - PROBLEM SELECTOR PLAN 7
Diet: Regular  DVT proph: SCD, patient refused AC  Dispo: Home pending course. PT consult ordered Diet: Fluid restricted regular  DVT proph: SCD, patient refused AC  Dispo: Home pending course. PT consult ordered Diet: Fluid restricted regular  DVT proph: SCD, patient refused AC  Dispo: Home with home PT and visiting nurse

## 2023-08-10 NOTE — PROGRESS NOTE ADULT - PROBLEM SELECTOR PLAN 3
- Pt had normal hgb in 2021. Now with microtic anemia, Hgb 9.8, iron 19, Ferritin 275, TIBC 195  - Likely mixed iron deficiency anemia and anemia of chronic disease given iron study  - Was previously up to date on colonoscopy before aging out  - Reports no dark or bloody stools  - start ferrous sulfate 325 qd  - ctm - Pt had normal hgb in 2021. Now with microtic anemia, Hgb 9.8, iron 19, Ferritin 275, TIBC 195  - Likely mixed iron deficiency anemia and anemia of chronic disease given iron study  - Was previously up to date on colonoscopy before aging out  - Reports no dark or bloody stools  - started ferrous sulfate 325 qd  - ctm

## 2023-08-10 NOTE — PROGRESS NOTE ADULT - ATTENDING COMMENTS
88yo Female with atrial fibrillation on aspirin, AS, lung nodules (stable), hx of pleural effusions, hypertension a/w GRIFFITH due to R moderate pleural effusion, s/p  thoracentesis with 1L fluid removed. Pleural fluid analysis with transudative effusion. Reported fever at home, here afebrile, no leukocytosis, pleural fluid gram stain negative. Elevated pro-BNP, hx of mild AS, afib, trace edema, with pleural effusion, TTE with concentric left ventricular remodeling, severe right atrial enlargement, right ventricular enlargement, moderate AS. Patient treated for CHF, acute HFpEF, transitioned to oral lasix 8/9. Pleural fluid culture NGTD, pending cytology. Anemia- likely multifactorial, iron def and AOCD- started PO iron. Hypotonic hyponatremia- likely due to poor solute intake and CHF, diet changed to high protein, c/w water restriction on discharge. Patient currently asymptomatic, oriented to follow up closely with PCP for a repeat BMP within 1 week, patient expresses understanding. DC home today

## 2023-08-10 NOTE — PROGRESS NOTE ADULT - PROBLEM SELECTOR PLAN 1
- CHF vs malignant pleural effusion vs inflammatory. Working diagnosis is CHF given transudative effusion (/332, Protein 3.5/7.2, normal pH, glucose) and symptom improvement with diuresis.  - s/p Emergent thoracentesis by Pulmonology; 1050mL removed. Simple appearing effusion.  - Pt with irregular right 1.8 cm nodule that has been suspicious for lung cancer, but has been overall stable over last 3 years (previously was 1.5cm) per pulm note. Also has new left lung nodules on 6/7/23 CT.   - Had left VATS with pleurodesis in 2020. Pleural effusion at that time negative for malignant cells.  - Pro-BNP elevated at 2257.   - s/p Lasix 20mg IV BID (8/7-8/8) with symptom improvement  - ESR on outpatient labs 8/3 elevated to 108  - CRP on outpatient labs 8/3 elevated to 152  - TTE (8/8) showed LV concentric remodeling, normal LVSF EF 60-65%, mildly dilated LA, borderline PASP  - Likely has HFpEF given high H2FPEF score (>=6) and elevated BNP, will need medication adjustment outpatient.  - c/w Lasix 40mg QD - CHF vs malignant pleural effusion vs inflammatory. Working diagnosis is CHF given transudative effusion (/332, Protein 3.5/7.2, normal pH, glucose) and symptom improvement with diuresis.  - TTE (8/8) showed LV concentric remodeling, normal LVSF EF 60-65%, mildly dilated LA, borderline PASP, likely represents HFpEF, has high H2FPEF score (>=6) and elevated BNP  - s/p Emergent thoracentesis by Pulmonology; 1050mL removed. Simple appearing effusion. f/u Thoracentesis labs  - Pt with irregular right 1.8 cm nodule that has been suspicious for lung cancer, but has been overall stable over last 3 years (previously was 1.5cm) per pulm note. Also has new left lung nodules on 6/7/23 CT. Being followed by outpatient pulmonologist Dr. Bethel Chandler  - Had left VATS with pleurodesis in 2020. Pleural effusion at that time negative for malignant cells.  - Pro-BNP elevated at 2257.   - s/p Lasix 20mg IV BID (8/7-8/8) with symptom improvement  - c/w Lasix PO 40mg QD (8/9 - )

## 2023-08-10 NOTE — PROGRESS NOTE ADULT - PROBLEM SELECTOR PLAN 2
- Unclear etiology, malignant pleural effusion vs inflammatory  - ESR and CRP severely elevated, as above  - Will send tick borne panel  - f/u blood culture, urine culture, pleural fluid culture  - f/u autoimmune workup MEGAN, ANCA, HIV  - Given no white count would monitor off of Abx for now  - If spikes fever, would start Ceftriaxone and Azithromycin - Unclear etiology, malignant pleural effusion vs inflammatory  - ESR and CRP elevated on outpatient labs  - Infectious and rheumatologic workup negative so far  - f/u blood culture, urine culture, pleural fluid culture  - f/u autoimmune workup MEGAN, ANCA, HIV  - Given no white count would monitor off of Abx for now  - If spikes fever, would start Ceftriaxone and Azithromycin - ESR and CRP elevated on outpatient labs  - Infectious and rheumatologic workup negative so far  -  blood culture, urine culture, pleural fluid culture negative, HIV neg  - f/u autoimmune workup MEGAN, ANCA as OP, no other sings of rheumatologic disease  - Given no white count would monitor off of Abx for now  -no fevers while admitted

## 2023-08-10 NOTE — PROGRESS NOTE ADULT - PROBLEM SELECTOR PLAN 6
- c/w Metoprolol  - c/w Ramipril  - c/w Lasix  - Monitor renal function - c/w Metoprolol 50 qhs  - c/w Ramipril 10 qd  - c/w Lasix 40 qd  - Monitor renal function

## 2023-08-10 NOTE — PROVIDER CONTACT NOTE (OTHER) - ASSESSMENT
Patients blood pressure pose Lasix po 157/59 heart rate 72
Patient states she has a cramp on right leg. Patient states sequential compression device help
Patients blood pressure 162/78 heart rate 79. Patient asymptomatic , due for Lasix PO
Patient with elevated blood pressure. Patients blood pressure 176/81 heart rate 75. Patient resting in bed. Patient states maybe because she is upset as she is trying to fall asleep and patient states she feels a little weak. Other wise patient states she is okay
Lasix 20mg IV push administered early as it populated in [facility] for 6am. Patients blood pressure 144/80 heart rate 76 prior to administration and 148/60 heart rate 77 post Lasix.

## 2023-08-10 NOTE — PROVIDER CONTACT NOTE (OTHER) - BACKGROUND
Patient diagnosed with  "other form of dyspnea"

## 2023-08-10 NOTE — PROVIDER CONTACT NOTE (OTHER) - REASON
Patient with elevated blood pressure
Patients blood pressure pose Lasix po 157/59 heart rate 72
Patient with elevated blood pressure
Lasix IV push order for two times a day, administered early this morning as it populated in [facility] for 6am
Patient states she has a cramp on right leg

## 2023-08-10 NOTE — PROGRESS NOTE ADULT - SUBJECTIVE AND OBJECTIVE BOX
CHIEF COMPLAINT:    Interval Events:    REVIEW OF SYSTEMS:  Constitutional: [ ] negative [ ] fevers [ ] chills [ ] weight loss [ ] weight gain  HEENT: [ ] negative [ ] dry eyes [ ] eye irritation [ ] postnasal drip [ ] nasal congestion  CV: [ ] negative  [ ] chest pain [ ] orthopnea [ ] palpitations [ ] murmur  Resp: [ ] negative [ ] cough [ ] shortness of breath [ ] dyspnea [ ] wheezing [ ] sputum [ ] hemoptysis  GI: [ ] negative [ ] nausea [ ] vomiting [ ] diarrhea [ ] constipation [ ] abd pain [ ] dysphagia   : [ ] negative [ ] dysuria [ ] nocturia [ ] hematuria [ ] increased urinary frequency  Musculoskeletal: [ ] negative [ ] back pain [ ] myalgias [ ] arthralgias [ ] fracture  Skin: [ ] negative [ ] rash [ ] itch  Neurological: [ ] negative [ ] headache [ ] dizziness [ ] syncope [ ] weakness [ ] numbness  Psychiatric: [ ] negative [ ] anxiety [ ] depression  Endocrine: [ ] negative [ ] diabetes [ ] thyroid problem  Hematologic/Lymphatic: [ ] negative [ ] anemia [ ] bleeding problem  Allergic/Immunologic: [ ] negative [ ] itchy eyes [ ] nasal discharge [ ] hives [ ] angioedema  [ ] All other systems negative  [ ] Unable to assess ROS because ________    OBJECTIVE:  ICU Vital Signs Last 24 Hrs  T(C): 36.8 (10 Aug 2023 05:05), Max: 36.8 (10 Aug 2023 05:05)  T(F): 98.2 (10 Aug 2023 05:05), Max: 98.2 (10 Aug 2023 05:05)  HR: 72 (10 Aug 2023 06:42) (72 - 88)  BP: 157/59 (10 Aug 2023 06:42) (132/74 - 162/78)  BP(mean): --  ABP: --  ABP(mean): --  RR: 18 (10 Aug 2023 06:42) (17 - 18)  SpO2: 99% (10 Aug 2023 06:42) (95% - 99%)    O2 Parameters below as of 10 Aug 2023 06:42  Patient On (Oxygen Delivery Method): room air              08-09 @ 07:01  -  08-10 @ 07:00  --------------------------------------------------------  IN: 0 mL / OUT: 900 mL / NET: -900 mL      CAPILLARY BLOOD GLUCOSE          PHYSICAL EXAM:  General:   HEENT:   Lymph Nodes:  Neck:   Respiratory:   Cardiovascular:   Abdomen:   Extremities:   Skin:   Neurological:  Psychiatry:    HOSPITAL MEDICATIONS:  aspirin  chewable 81 milliGRAM(s) Oral daily      furosemide    Tablet 40 milliGRAM(s) Oral daily  metoprolol succinate ER 50 milliGRAM(s) Oral at bedtime  ramipril 10 milliGRAM(s) Oral at bedtime                ferrous    sulfate 325 milliGRAM(s) Oral daily  multivitamin 1 Tablet(s) Oral daily  sodium chloride 1 Gram(s) Oral three times a day            LABS:                        9.9    8.51  )-----------( 346      ( 09 Aug 2023 05:50 )             31.3     Hgb Trend: 9.9<--, 9.9<--, 9.3<--  08-09    127<L>  |  91<L>  |  23  ----------------------------<  118<H>  4.7   |  27  |  0.83    Ca    9.0      09 Aug 2023 13:34  Phos  3.6     08-09  Mg     2.00     08-09    TPro  6.4  /  Alb  2.9<L>  /  TBili  0.3  /  DBili  x   /  AST  35<H>  /  ALT  33  /  AlkPhos  136<H>  08-09    Creatinine Trend: 0.83<--, 0.82<--, 0.88<--, 0.91<--    Urinalysis Basic - ( 09 Aug 2023 13:34 )    Color: x / Appearance: x / SG: x / pH: x  Gluc: 118 mg/dL / Ketone: x  / Bili: x / Urobili: x   Blood: x / Protein: x / Nitrite: x   Leuk Esterase: x / RBC: x / WBC x   Sq Epi: x / Non Sq Epi: x / Bacteria: x            MICROBIOLOGY:     RADIOLOGY:  [ ] Reviewed and interpreted by me    PULMONARY FUNCTION TESTS:    EKG: CHIEF COMPLAINT: sob    Interval Events: Patient seen and examined at bedside.     REVIEW OF SYSTEMS:  Constitutional: [ ] negative [ ] fevers [ ] chills [ ] weight loss [ ] weight gain  HEENT: [ ] negative [ ] dry eyes [ ] eye irritation [ ] postnasal drip [ ] nasal congestion  CV: [ ] negative  [ ] chest pain [ ] orthopnea [ ] palpitations [ ] murmur  Resp: [ ] negative [ ] cough [ ] shortness of breath [ ] dyspnea [ ] wheezing [ ] sputum [ ] hemoptysis  GI: [ ] negative [ ] nausea [ ] vomiting [ ] diarrhea [ ] constipation [ ] abd pain [ ] dysphagia   : [ ] negative [ ] dysuria [ ] nocturia [ ] hematuria [ ] increased urinary frequency  Musculoskeletal: [ ] negative [ ] back pain [ ] myalgias [ ] arthralgias [ ] fracture  Skin: [ ] negative [ ] rash [ ] itch  Neurological: [ ] negative [ ] headache [ ] dizziness [ ] syncope [ ] weakness [ ] numbness  Psychiatric: [ ] negative [ ] anxiety [ ] depression  Endocrine: [ ] negative [ ] diabetes [ ] thyroid problem  Hematologic/Lymphatic: [ ] negative [ ] anemia [ ] bleeding problem  Allergic/Immunologic: [ ] negative [ ] itchy eyes [ ] nasal discharge [ ] hives [ ] angioedema  [ ] All other systems negative  [ ] Unable to assess ROS because ________    OBJECTIVE:  ICU Vital Signs Last 24 Hrs  T(C): 36.8 (10 Aug 2023 05:05), Max: 36.8 (10 Aug 2023 05:05)  T(F): 98.2 (10 Aug 2023 05:05), Max: 98.2 (10 Aug 2023 05:05)  HR: 72 (10 Aug 2023 06:42) (72 - 88)  BP: 157/59 (10 Aug 2023 06:42) (132/74 - 162/78)  BP(mean): --  ABP: --  ABP(mean): --  RR: 18 (10 Aug 2023 06:42) (17 - 18)  SpO2: 99% (10 Aug 2023 06:42) (95% - 99%)    O2 Parameters below as of 10 Aug 2023 06:42  Patient On (Oxygen Delivery Method): room air              08-09 @ 07:01  -  08-10 @ 07:00  --------------------------------------------------------  IN: 0 mL / OUT: 900 mL / NET: -900 mL      CAPILLARY BLOOD GLUCOSE          PHYSICAL EXAM:  General:   HEENT:   Lymph Nodes:  Neck:   Respiratory:   Cardiovascular:   Abdomen:   Extremities:   Skin:   Neurological:  Psychiatry:    HOSPITAL MEDICATIONS:  aspirin  chewable 81 milliGRAM(s) Oral daily      furosemide    Tablet 40 milliGRAM(s) Oral daily  metoprolol succinate ER 50 milliGRAM(s) Oral at bedtime  ramipril 10 milliGRAM(s) Oral at bedtime                ferrous    sulfate 325 milliGRAM(s) Oral daily  multivitamin 1 Tablet(s) Oral daily  sodium chloride 1 Gram(s) Oral three times a day            LABS:                        9.9    8.51  )-----------( 346      ( 09 Aug 2023 05:50 )             31.3     Hgb Trend: 9.9<--, 9.9<--, 9.3<--  08-09    127<L>  |  91<L>  |  23  ----------------------------<  118<H>  4.7   |  27  |  0.83    Ca    9.0      09 Aug 2023 13:34  Phos  3.6     08-09  Mg     2.00     08-09    TPro  6.4  /  Alb  2.9<L>  /  TBili  0.3  /  DBili  x   /  AST  35<H>  /  ALT  33  /  AlkPhos  136<H>  08-09    Creatinine Trend: 0.83<--, 0.82<--, 0.88<--, 0.91<--    Urinalysis Basic - ( 09 Aug 2023 13:34 )    Color: x / Appearance: x / SG: x / pH: x  Gluc: 118 mg/dL / Ketone: x  / Bili: x / Urobili: x   Blood: x / Protein: x / Nitrite: x   Leuk Esterase: x / RBC: x / WBC x   Sq Epi: x / Non Sq Epi: x / Bacteria: x            MICROBIOLOGY:     RADIOLOGY:  [ ] Reviewed and interpreted by me    PULMONARY FUNCTION TESTS:    EKG: CHIEF COMPLAINT: sob    Interval Events: Patient seen and examined at bedside. NO acute events overnight. Anxious to go home.    REVIEW OF SYSTEMS:  Constitutional: [ X] negative [ ] fevers [ ] chills [ ] weight loss [ ] weight gain  HEENT: [X ] negative [ ] dry eyes [ ] eye irritation [ ] postnasal drip [ ] nasal congestion  CV: [ X] negative  [ ] chest pain [ ] orthopnea [ ] palpitations [ ] murmur  Resp: [ X] negative [ ] cough [ ] shortness of breath [ ] dyspnea [ ] wheezing [ ] sputum [ ] hemoptysis  GI: [X ] negative [ ] nausea [ ] vomiting [ ] diarrhea [ ] constipation [ ] abd pain [ ] dysphagia   : [ X] negative [ ] dysuria [ ] nocturia [ ] hematuria [ ] increased urinary frequency  Musculoskeletal: [ ] negative [ ] back pain [ ] myalgias [ ] arthralgias [ ] fracture  Skin: [ ] negative [ ] rash [ ] itch  Neurological: [ ] negative [ ] headache [ ] dizziness [ ] syncope [ ] weakness [ ] numbness  Psychiatric: [ ] negative [ ] anxiety [ ] depression  Endocrine: [ ] negative [ ] diabetes [ ] thyroid problem  Hematologic/Lymphatic: [ ] negative [ ] anemia [ ] bleeding problem  Allergic/Immunologic: [ ] negative [ ] itchy eyes [ ] nasal discharge [ ] hives [ ] angioedema  [ ] All other systems negative  [ ] Unable to assess ROS because ________    OBJECTIVE:  ICU Vital Signs Last 24 Hrs  T(C): 36.8 (10 Aug 2023 05:05), Max: 36.8 (10 Aug 2023 05:05)  T(F): 98.2 (10 Aug 2023 05:05), Max: 98.2 (10 Aug 2023 05:05)  HR: 72 (10 Aug 2023 06:42) (72 - 88)  BP: 157/59 (10 Aug 2023 06:42) (132/74 - 162/78)  BP(mean): --  ABP: --  ABP(mean): --  RR: 18 (10 Aug 2023 06:42) (17 - 18)  SpO2: 99% (10 Aug 2023 06:42) (95% - 99%)    O2 Parameters below as of 10 Aug 2023 06:42  Patient On (Oxygen Delivery Method): room air              08-09 @ 07:01  -  08-10 @ 07:00  --------------------------------------------------------  IN: 0 mL / OUT: 900 mL / NET: -900 mL      CAPILLARY BLOOD GLUCOSE          PHYSICAL EXAM:  General: well appearing, NAD, sitting in bed  HEENT: no icterus  Neck: supple  Respiratory: CTABL  Cardiovascular: s1/s2, rrr  Abdomen:  soft, nontender  Extremities: no LE edema  Skin: no rashes  Neurological: AxOX3  Psychiatry: normal mood and affect        HOSPITAL MEDICATIONS:  aspirin  chewable 81 milliGRAM(s) Oral daily      furosemide    Tablet 40 milliGRAM(s) Oral daily  metoprolol succinate ER 50 milliGRAM(s) Oral at bedtime  ramipril 10 milliGRAM(s) Oral at bedtime                ferrous    sulfate 325 milliGRAM(s) Oral daily  multivitamin 1 Tablet(s) Oral daily  sodium chloride 1 Gram(s) Oral three times a day            LABS:                        9.9    8.51  )-----------( 346      ( 09 Aug 2023 05:50 )             31.3     Hgb Trend: 9.9<--, 9.9<--, 9.3<--  08-09    127<L>  |  91<L>  |  23  ----------------------------<  118<H>  4.7   |  27  |  0.83    Ca    9.0      09 Aug 2023 13:34  Phos  3.6     08-09  Mg     2.00     08-09    TPro  6.4  /  Alb  2.9<L>  /  TBili  0.3  /  DBili  x   /  AST  35<H>  /  ALT  33  /  AlkPhos  136<H>  08-09    Creatinine Trend: 0.83<--, 0.82<--, 0.88<--, 0.91<--    Urinalysis Basic - ( 09 Aug 2023 13:34 )    Color: x / Appearance: x / SG: x / pH: x  Gluc: 118 mg/dL / Ketone: x  / Bili: x / Urobili: x   Blood: x / Protein: x / Nitrite: x   Leuk Esterase: x / RBC: x / WBC x   Sq Epi: x / Non Sq Epi: x / Bacteria: x            MICROBIOLOGY:     RADIOLOGY:  [X] Reviewed and interpreted by me

## 2023-08-10 NOTE — PROGRESS NOTE ADULT - SUBJECTIVE AND OBJECTIVE BOX
Patient is a 89y old  Female who presents with a chief complaint of SOB (09 Aug 2023 11:56)      ======Subjective/Overnight events======  no acute event overnight, patient seen and examined by bedside during AM rounds, denies dizziness, headache, fever, chest pain, SOB, nausea, vomiting, diarrhea, dysuria. Appetite good, tolerating diet, had normal BM yesterday.    ======Medications======  MEDICATIONS  (STANDING):  aspirin  chewable 81 milliGRAM(s) Oral daily  ferrous    sulfate 325 milliGRAM(s) Oral daily  furosemide    Tablet 40 milliGRAM(s) Oral daily  metoprolol succinate ER 50 milliGRAM(s) Oral at bedtime  multivitamin 1 Tablet(s) Oral daily  ramipril 10 milliGRAM(s) Oral at bedtime  sodium chloride 1 Gram(s) Oral three times a day    MEDICATIONS  (PRN):      ======Vital Signs======  T(C): 36.8 (08-10-23 @ 05:05), Max: 36.8 (08-10-23 @ 05:05)  T(F): 98.2 (08-10-23 @ 05:05), Max: 98.2 (08-10-23 @ 05:05)  HR: 72 (08-10-23 @ 06:42) (72 - 88)  BP: 157/59 (08-10-23 @ 06:42) (132/74 - 162/78)  BP(mean): --  RR: 18 (08-10-23 @ 06:42) (17 - 18)  SpO2: 99% (08-10-23 @ 06:42) (95% - 99%)    ======PHYSICAL EXAM======  GENERAL: NAD, lying in bed comfortably  HEAD:  Atraumatic, normocephalic  EYES: EOMI, PERRLA, conjunctiva clear, no conjunctival pallor, anicteric sclera  NECK: Supple, trachea midline, no JVD  HEART: Regular rate and rhythm, Normal S1 S2, no murmurs, rubs, or gallops  LUNGS: Unlabored respirations.  Clear to auscultation bilaterally, no crackles, wheezing, or rhonchi  ABDOMEN: Soft, nondistended, nontender, no rebound or guarding, bowel sounds presents  EXTREMITIES: Warm extremities, no clubbing, cyanosis, or edema, peripheral pulses 2+ bilaterally  MUSCULOSKELETAL: No joint swelling or tenderness to palpation  NEURO: CN 2-12 grossly intact, moves all limbs spontaneously  SKIN: No rashes or lesions    ======Labs======                9.9<L>  8.51 >----------< 346  (MCV: 72.8<L>)                31.3<L>   127<L> | 91<L> | 23  -----------------------< 118<H>  4.7 | 27 | 0.83    TPro: 6.4 / Alb: 2.9<L> / TBili: 0.3 / DBili: -- / AlkPhos: 136<H> / ALT: 33 / AST: 35<H> (08-09-23 @ 05:50)  Ca: 9.0 / Phos: -- / Mg: -- (08-09-23 @ 13:34)          ======Microbiology======  Urinalysis Basic - ( 09 Aug 2023 13:34 )    Color: x / Appearance: x / SG: x / pH: x  Gluc: 118 mg/dL / Ketone: x  / Bili: x / Urobili: x   Blood: x / Protein: x / Nitrite: x   Leuk Esterase: x / RBC: x / WBC x   Sq Epi: x / Non Sq Epi: x / Bacteria: x        Culture - Acid Fast - Body Fluid w/Smear (collected 07 Aug 2023 20:39)  Source: Pleural Fl Pleural Fluid  Preliminary Report (09 Aug 2023 15:09):    Culture is being performed.    Culture - Fungal, Body Fluid (collected 07 Aug 2023 20:39)  Source: Pleural Fl Pleural Fluid  Preliminary Report (09 Aug 2023 15:03):    Culture is being performed. Fungal cultures are held for 4 weeks.    Culture - Body Fluid with Gram Stain (collected 07 Aug 2023 20:39)  Source: Pleural Fl Pleural Fluid  Gram Stain (08 Aug 2023 02:47):    No polymorphonuclear leukocytes seen    No organisms seen    by cytocentrifuge  Preliminary Report (08 Aug 2023 22:25):    No growth    Culture - Urine (collected 07 Aug 2023 18:28)  Source: Clean Catch Clean Catch (Midstream)  Preliminary Report (09 Aug 2023 07:18):    10,000 - 49,000 CFU/mL Escherichia coli    Culture - Blood (collected 07 Aug 2023 11:53)  Source: .Blood Blood-Peripheral  Preliminary Report (09 Aug 2023 17:02):    No growth at 48 Hours    Culture - Blood (collected 07 Aug 2023 11:48)  Source: .Blood Blood-Peripheral  Preliminary Report (09 Aug 2023 17:02):    No growth at 48 Hours        ======Radiology & Additional tests======  Results Reviewed:   Imaging Personally Reviewed:  Electrocardiogram Personally Reviewed:    ======I&O's======  I&O's Summary    09 Aug 2023 07:01  -  10 Aug 2023 07:00  --------------------------------------------------------  IN: 0 mL / OUT: 900 mL / NET: -900 mL         Patient is a 89y old  Female who presents with a chief complaint of SOB (09 Aug 2023 11:56)      ======Subjective/Overnight events======  c/o constipation since admission, but passing gas freely. Reports mild nausea with salt tabs, otherwise no acute complaints, states b/l leg cramps resolved. Has not been eating much, states doesn't like the food, drank 4 cups of water yesterday. Otherwise, no acute event overnight, patient seen and examined by bedside during AM rounds, denies dizziness, headache, fever, chest pain, SOB, nausea, vomiting, diarrhea, dysuria.     ======Medications======  MEDICATIONS  (STANDING):  aspirin  chewable 81 milliGRAM(s) Oral daily  ferrous    sulfate 325 milliGRAM(s) Oral daily  furosemide    Tablet 40 milliGRAM(s) Oral daily  metoprolol succinate ER 50 milliGRAM(s) Oral at bedtime  multivitamin 1 Tablet(s) Oral daily  ramipril 10 milliGRAM(s) Oral at bedtime  sodium chloride 1 Gram(s) Oral three times a day    MEDICATIONS  (PRN):      ======Vital Signs======  T(C): 36.8 (08-10-23 @ 05:05), Max: 36.8 (08-10-23 @ 05:05)  T(F): 98.2 (08-10-23 @ 05:05), Max: 98.2 (08-10-23 @ 05:05)  HR: 72 (08-10-23 @ 06:42) (72 - 88)  BP: 157/59 (08-10-23 @ 06:42) (132/74 - 162/78)  BP(mean): --  RR: 18 (08-10-23 @ 06:42) (17 - 18)  SpO2: 99% (08-10-23 @ 06:42) (95% - 99%)    ======PHYSICAL EXAM======  GENERAL: NAD, lying in bed comfortably  HEAD:  Atraumatic, normocephalic  EYES: EOMI, PERRLA, conjunctiva clear, no conjunctival pallor, anicteric sclera  NECK: Supple, trachea midline, no JVD  HEART: Regular rate and rhythm, Normal S1 S2, no murmurs, rubs, or gallops  LUNGS: Unlabored respirations.  Clear to auscultation bilaterally, no crackles, wheezing, or rhonchi  ABDOMEN: Soft, nondistended, nontender, no rebound or guarding, bowel sounds presents  EXTREMITIES: Warm extremities, no clubbing, cyanosis, or edema, peripheral pulses 2+ bilaterally  MUSCULOSKELETAL: No joint swelling or tenderness to palpation  NEURO: CN 2-12 grossly intact, moves all limbs spontaneously  SKIN: No rashes or lesions    ======Labs======                9.9<L>  8.51 >----------< 346  (MCV: 72.8<L>)                31.3<L>   127<L> | 91<L> | 23  -----------------------< 118<H>  4.7 | 27 | 0.83    TPro: 6.4 / Alb: 2.9<L> / TBili: 0.3 / DBili: -- / AlkPhos: 136<H> / ALT: 33 / AST: 35<H> (08-09-23 @ 05:50)  Ca: 9.0 / Phos: -- / Mg: -- (08-09-23 @ 13:34)          ======Microbiology======  Urinalysis Basic - ( 09 Aug 2023 13:34 )    Color: x / Appearance: x / SG: x / pH: x  Gluc: 118 mg/dL / Ketone: x  / Bili: x / Urobili: x   Blood: x / Protein: x / Nitrite: x   Leuk Esterase: x / RBC: x / WBC x   Sq Epi: x / Non Sq Epi: x / Bacteria: x        Culture - Acid Fast - Body Fluid w/Smear (collected 07 Aug 2023 20:39)  Source: Pleural Fl Pleural Fluid  Preliminary Report (09 Aug 2023 15:09):    Culture is being performed.    Culture - Fungal, Body Fluid (collected 07 Aug 2023 20:39)  Source: Pleural Fl Pleural Fluid  Preliminary Report (09 Aug 2023 15:03):    Culture is being performed. Fungal cultures are held for 4 weeks.    Culture - Body Fluid with Gram Stain (collected 07 Aug 2023 20:39)  Source: Pleural Fl Pleural Fluid  Gram Stain (08 Aug 2023 02:47):    No polymorphonuclear leukocytes seen    No organisms seen    by cytocentrifuge  Preliminary Report (08 Aug 2023 22:25):    No growth    Culture - Urine (collected 07 Aug 2023 18:28)  Source: Clean Catch Clean Catch (Midstream)  Preliminary Report (09 Aug 2023 07:18):    10,000 - 49,000 CFU/mL Escherichia coli    Culture - Blood (collected 07 Aug 2023 11:53)  Source: .Blood Blood-Peripheral  Preliminary Report (09 Aug 2023 17:02):    No growth at 48 Hours    Culture - Blood (collected 07 Aug 2023 11:48)  Source: .Blood Blood-Peripheral  Preliminary Report (09 Aug 2023 17:02):    No growth at 48 Hours        ======Radiology & Additional tests======  Results Reviewed:   Imaging Personally Reviewed:  Electrocardiogram Personally Reviewed:    ======I&O's======  I&O's Summary    09 Aug 2023 07:01  -  10 Aug 2023 07:00  --------------------------------------------------------  IN: 0 mL / OUT: 900 mL / NET: -900 mL

## 2023-08-10 NOTE — PROGRESS NOTE ADULT - PROBLEM SELECTOR PLAN 4
- Likely i/s/o tea and toast diet +/- HFpEF  - sodium 130, sOsm 274, uOsm 170, Stephanie 53 iso Lasix  - patient states she has been drinking a lot of fluid and not eating a lot recently 2/2 nausea, patient appears clinically euvolemic  - Fluid restrict diet, encourage protein intake  - Legionella -ve  - c/w Lasix 40mg QD - Likely i/s/o tea and toast diet +/- HFpEF  - patient states she has been drinking a lot of fluid and not eating a lot recently 2/2 nausea, patient appears clinically euvolemic  - Fluid restrict diet, encourage protein intake  - Legionella -ve  - c/w Lasix 40mg QD  - Sodium 128, sOsm 276, uOsm 451, Stephanie 52 iso Lasix (8/10)  - f/u Lipid panel given normosmotic hyponatremia

## 2023-08-11 LAB
-  CEFTRIAXONE: SIGNIFICANT CHANGE UP
-  LEVOFLOXACIN: SIGNIFICANT CHANGE UP
-  PENICILLIN: SIGNIFICANT CHANGE UP
-  VANCOMYCIN: SIGNIFICANT CHANGE UP
CULTURE RESULTS: SIGNIFICANT CHANGE UP
METHOD TYPE: SIGNIFICANT CHANGE UP
ORGANISM # SPEC MICROSCOPIC CNT: SIGNIFICANT CHANGE UP
SPECIMEN SOURCE: SIGNIFICANT CHANGE UP

## 2023-08-12 LAB
CULTURE RESULTS: SIGNIFICANT CHANGE UP
SPECIMEN SOURCE: SIGNIFICANT CHANGE UP

## 2023-08-13 ENCOUNTER — NON-APPOINTMENT (OUTPATIENT)
Age: 88
End: 2023-08-13

## 2023-08-15 ENCOUNTER — APPOINTMENT (OUTPATIENT)
Dept: PULMONOLOGY | Facility: CLINIC | Age: 88
End: 2023-08-15
Payer: MEDICARE

## 2023-08-15 VITALS
BODY MASS INDEX: 25.2 KG/M2 | DIASTOLIC BLOOD PRESSURE: 74 MMHG | SYSTOLIC BLOOD PRESSURE: 168 MMHG | OXYGEN SATURATION: 96 % | WEIGHT: 133.5 LBS | HEART RATE: 76 BPM | TEMPERATURE: 97.4 F | RESPIRATION RATE: 15 BRPM | HEIGHT: 61 IN

## 2023-08-15 DIAGNOSIS — E87.1 HYPO-OSMOLALITY AND HYPONATREMIA: ICD-10-CM

## 2023-08-15 LAB
ALBUMIN SERPL ELPH-MCNC: 3.9 G/DL
ALP BLD-CCNC: 153 U/L
ALT SERPL-CCNC: 32 U/L
ANION GAP SERPL CALC-SCNC: 16 MMOL/L
AST SERPL-CCNC: 32 U/L
BILIRUB SERPL-MCNC: 0.2 MG/DL
BUN SERPL-MCNC: 38 MG/DL
CALCIUM SERPL-MCNC: 9.1 MG/DL
CHLORIDE SERPL-SCNC: 96 MMOL/L
CO2 SERPL-SCNC: 24 MMOL/L
CREAT SERPL-MCNC: 1.03 MG/DL
EGFR: 52 ML/MIN/1.73M2
GLUCOSE SERPL-MCNC: 84 MG/DL
POTASSIUM SERPL-SCNC: 4.8 MMOL/L
PROT SERPL-MCNC: 6.6 G/DL
SODIUM SERPL-SCNC: 136 MMOL/L

## 2023-08-15 PROCEDURE — 99214 OFFICE O/P EST MOD 30 MIN: CPT

## 2023-08-15 NOTE — REVIEW OF SYSTEMS
[Fever] : no fever [Night Sweats] : no night sweats [Discharge] : no discharge [Vision Problems] : no vision problems [Chest Pain] : no chest pain [Orthopnea] : no orthopnea [Shortness Of Breath] : no shortness of breath [Abdominal Pain] : no abdominal pain [Vomiting] : no vomiting [Dysuria] : no dysuria [Hematuria] : no hematuria [Joint Pain] : no joint pain

## 2023-08-15 NOTE — PHYSICAL EXAM
[No Acute Distress] : no acute distress [Well Nourished] : well nourished [Well Developed] : well developed [Normal Sclera/Conjunctiva] : normal sclera/conjunctiva [Normal Outer Ear/Nose] : the outer ears and nose were normal in appearance [Normal Oropharynx] : the oropharynx was normal [No Respiratory Distress] : no respiratory distress  [No Accessory Muscle Use] : no accessory muscle use [Normal Rate] : normal rate  [Soft] : abdomen soft [No HSM] : no HSM [No Joint Swelling] : no joint swelling [No Rash] : no rash [de-identified] : 1/6 systolic ejection murmur

## 2023-08-15 NOTE — HISTORY OF PRESENT ILLNESS
[[facility]] : Post-hospitalization from [facility] [Pleural effusion] : Pleural effusion [FreeTextEntry2] : 89-year-old female with past medical history of atrial fibrillation on aspirin, lung nodules (stable), pleural effusion  (s/p vats pleurodesis in 2020 on left), hypertension, presents for follow up post hospital discharge.  Patient presented to the hospital for  worsening shortness of breath and intermittent fevers for the past few weeks. She was also found to have a  right sided pleural effusion of unclear etiology s/p thoracentesis showing transudative fluid with negative cytopathology. IT was thought her effusion was secondary to HF (unilateral on right because she had a vats and pleurodesis on left). Echo showed  TTE with moderate AS and LV hypertrophy. She also underwent an infectious work up which was negative. Her course was complicated by hyponatremia, and she was discharged on a fluid restricted diet and increase in her lasix to 40 mg for HF.  Since discharge, patient states she feels well. She is taking her lasix with good UO and drinking < 1.5L/ day. SHe reports some swelling in her legs which she contributes to walking more. She denies any more fevers at home.   4. Hyponatremia likely due to poor solute intake vs. hypervolemia. Asymptomatic. Hyponatremia stable. On NaCl tabs. Restrict fluid intake. Continue furosemide.  5. Stable for discharge today with close outpatient follow-up with Dr. Chandler.

## 2023-08-15 NOTE — END OF VISIT
[] : Fellow [Time Spent: ___ minutes] : I have spent [unfilled] minutes of time on the encounter. [FreeTextEntry3] : I spent a total of 30 minutes with this patient contact including face-to-face time, reviewing of hospital records, imaging and documentation

## 2023-08-15 NOTE — ASSESSMENT
[FreeTextEntry1] : 89-year-old female with past medical history of atrial fibrillation on aspirin, lung nodules (stable), pleural effusion  (s/p vats pleurodesis in 2020 on left), hypertension, presents for follow up post hospital discharge.  #Pleural effusion: - transudative, cytopath negative and cx negative, ADA 4 - likely from HF in setting of AS  - c/w diuresis with 40mg of lasix daily  #Hyponatremia: - unclear etiology--> may be from poor PO intake and high fluid intake - c/w fluid restriction - check Na level today  #RLL nodule: - CT on 8/4 with stable appearing nodule - continue to monitor for now  #Fevers: - afebrile since hospitalization - infectious w/u negative inpatient   RTC in 3-6 months.

## 2023-08-30 ENCOUNTER — APPOINTMENT (OUTPATIENT)
Dept: CARDIOLOGY | Facility: CLINIC | Age: 88
End: 2023-08-30
Payer: MEDICARE

## 2023-08-30 ENCOUNTER — NON-APPOINTMENT (OUTPATIENT)
Age: 88
End: 2023-08-30

## 2023-08-30 VITALS
OXYGEN SATURATION: 100 % | RESPIRATION RATE: 17 BRPM | SYSTOLIC BLOOD PRESSURE: 148 MMHG | BODY MASS INDEX: 25.11 KG/M2 | WEIGHT: 133 LBS | DIASTOLIC BLOOD PRESSURE: 86 MMHG | HEART RATE: 66 BPM | HEIGHT: 61 IN

## 2023-08-30 VITALS — SYSTOLIC BLOOD PRESSURE: 152 MMHG | DIASTOLIC BLOOD PRESSURE: 80 MMHG

## 2023-08-30 PROCEDURE — 99214 OFFICE O/P EST MOD 30 MIN: CPT | Mod: 25

## 2023-08-30 PROCEDURE — 93000 ELECTROCARDIOGRAM COMPLETE: CPT

## 2023-08-30 RX ORDER — FERROUS SULFATE 325(65) MG
325 (65 FE) TABLET ORAL
Refills: 0 | Status: ACTIVE | COMMUNITY

## 2023-08-30 RX ORDER — FUROSEMIDE 20 MG/1
20 TABLET ORAL
Qty: 60 | Refills: 3 | Status: DISCONTINUED | COMMUNITY
Start: 2021-10-04 | End: 2023-08-30

## 2023-08-30 RX ORDER — FUROSEMIDE 20 MG/1
20 TABLET ORAL
Qty: 10 | Refills: 0 | Status: DISCONTINUED | COMMUNITY
Start: 2021-05-18 | End: 2023-08-30

## 2023-08-30 RX ORDER — FUROSEMIDE 20 MG/1
20 TABLET ORAL
Qty: 90 | Refills: 3 | Status: DISCONTINUED | COMMUNITY
Start: 2022-02-11 | End: 2023-08-30

## 2023-08-30 RX ORDER — FUROSEMIDE 20 MG/1
20 TABLET ORAL
Qty: 90 | Refills: 3 | Status: DISCONTINUED | COMMUNITY
Start: 2021-05-18 | End: 2023-08-30

## 2023-08-30 NOTE — DISCUSSION/SUMMARY
[FreeTextEntry1] : She is an 89-year-old with a history of chronic atrial fibrillation, hypertension with LVH and moderate aortic stenosis who had recent hospitalization for heart failure with preserved ejection fraction. HFpEF:Appears well compensated currently on her current regimen including 40 mg of Lasix daily and ramipril. Resolved hyponatremia. Hypertension: Discussed the need for continued aggressive medical therapy and she should continue her current dose of diuretics in addition to metoprolol and ramipril. Moderate aortic stenosis: No signs of decompensation currently.  Annual echo. Anemia: Referred to internal medicine for further work-up.  Continue iron. Atrial fibrillation: Chronic.  Heart rate well controlled on her current dose of metoprolol.  She adamantly refuses to consider oral anticoagulation despite the known increased stroke risk. We will consider a conversation about other options at the next visit.  Rhythm control Would require oral anticoagulation as well.  [EKG obtained to assist in diagnosis and management of assessed problem(s)] : EKG obtained to assist in diagnosis and management of assessed problem(s)

## 2023-08-30 NOTE — HISTORY OF PRESENT ILLNESS
[FreeTextEntry1] : recent hosp for fever and volume overload.   lsix increased ot 40 qd  atrial fibrillation

## 2023-09-06 LAB
CULTURE RESULTS: SIGNIFICANT CHANGE UP
SPECIMEN SOURCE: SIGNIFICANT CHANGE UP

## 2023-09-23 LAB
CULTURE RESULTS: SIGNIFICANT CHANGE UP
SPECIMEN SOURCE: SIGNIFICANT CHANGE UP

## 2023-10-16 NOTE — PATIENT PROFILE ADULT - FUNCTIONAL ASSESSMENT - DAILY ACTIVITY 1.
4 = No assist / stand by assistance Note Text (......Xxx Chief Complaint.): This diagnosis correlates with the Detail Level: Zone Other (Free Text): Patient stop using ointment and covering due to not liking the greasiness in her hair. Patient is very concerned about potential bald spots. Patient told she needs to continue ointment for best results. Render Risk Assessment In Note?: no

## 2023-10-19 ENCOUNTER — OUTPATIENT (OUTPATIENT)
Dept: OUTPATIENT SERVICES | Facility: HOSPITAL | Age: 88
LOS: 1 days | End: 2023-10-19
Payer: MEDICARE

## 2023-10-19 ENCOUNTER — APPOINTMENT (OUTPATIENT)
Dept: CT IMAGING | Facility: IMAGING CENTER | Age: 88
End: 2023-10-19
Payer: MEDICARE

## 2023-10-19 DIAGNOSIS — R93.89 ABNORMAL FINDINGS ON DIAGNOSTIC IMAGING OF OTHER SPECIFIED BODY STRUCTURES: ICD-10-CM

## 2023-10-19 DIAGNOSIS — Z96.641 PRESENCE OF RIGHT ARTIFICIAL HIP JOINT: Chronic | ICD-10-CM

## 2023-10-19 DIAGNOSIS — Z00.8 ENCOUNTER FOR OTHER GENERAL EXAMINATION: ICD-10-CM

## 2023-10-19 PROCEDURE — 71250 CT THORAX DX C-: CPT | Mod: 26

## 2023-10-19 PROCEDURE — 71250 CT THORAX DX C-: CPT

## 2023-11-07 ENCOUNTER — APPOINTMENT (OUTPATIENT)
Dept: PULMONOLOGY | Facility: CLINIC | Age: 88
End: 2023-11-07
Payer: MEDICARE

## 2023-11-07 DIAGNOSIS — D64.9 ANEMIA, UNSPECIFIED: ICD-10-CM

## 2023-11-07 PROCEDURE — 99214 OFFICE O/P EST MOD 30 MIN: CPT

## 2023-11-08 LAB
ANION GAP SERPL CALC-SCNC: 12 MMOL/L
BASOPHILS # BLD AUTO: 0.01 K/UL
BASOPHILS NFR BLD AUTO: 0.2 %
BUN SERPL-MCNC: 26 MG/DL
CALCIUM SERPL-MCNC: 9.4 MG/DL
CHLORIDE SERPL-SCNC: 98 MMOL/L
CO2 SERPL-SCNC: 28 MMOL/L
CREAT SERPL-MCNC: 0.98 MG/DL
EGFR: 55 ML/MIN/1.73M2
EOSINOPHIL # BLD AUTO: 0.14 K/UL
EOSINOPHIL NFR BLD AUTO: 2.8 %
FERRITIN SERPL-MCNC: 47 NG/ML
FOLATE SERPL-MCNC: >20 NG/ML
GLUCOSE SERPL-MCNC: 100 MG/DL
HCT VFR BLD CALC: 38.6 %
HGB BLD-MCNC: 11.9 G/DL
IMM GRANULOCYTES NFR BLD AUTO: 0.2 %
IRON SATN MFR SERPL: 11 %
IRON SERPL-MCNC: 37 UG/DL
LYMPHOCYTES # BLD AUTO: 1 K/UL
LYMPHOCYTES NFR BLD AUTO: 19.8 %
MAN DIFF?: NORMAL
MCHC RBC-ENTMCNC: 26.2 PG
MCHC RBC-ENTMCNC: 30.8 GM/DL
MCV RBC AUTO: 85 FL
MONOCYTES # BLD AUTO: 0.51 K/UL
MONOCYTES NFR BLD AUTO: 10.1 %
NEUTROPHILS # BLD AUTO: 3.37 K/UL
NEUTROPHILS NFR BLD AUTO: 66.9 %
PLATELET # BLD AUTO: 191 K/UL
POTASSIUM SERPL-SCNC: 4.5 MMOL/L
RBC # BLD: 4.54 M/UL
RBC # FLD: 19.2 %
SODIUM SERPL-SCNC: 137 MMOL/L
TIBC SERPL-MCNC: 326 UG/DL
UIBC SERPL-MCNC: 288 UG/DL
VIT B12 SERPL-MCNC: 890 PG/ML
WBC # FLD AUTO: 5.04 K/UL

## 2023-11-30 ENCOUNTER — NON-APPOINTMENT (OUTPATIENT)
Age: 88
End: 2023-11-30

## 2023-11-30 ENCOUNTER — APPOINTMENT (OUTPATIENT)
Dept: CARDIOLOGY | Facility: CLINIC | Age: 88
End: 2023-11-30
Payer: MEDICARE

## 2023-11-30 VITALS
BODY MASS INDEX: 25.3 KG/M2 | OXYGEN SATURATION: 100 % | HEIGHT: 61 IN | HEART RATE: 63 BPM | DIASTOLIC BLOOD PRESSURE: 80 MMHG | WEIGHT: 134 LBS | SYSTOLIC BLOOD PRESSURE: 130 MMHG

## 2023-11-30 DIAGNOSIS — J90 PLEURAL EFFUSION, NOT ELSEWHERE CLASSIFIED: ICD-10-CM

## 2023-11-30 PROCEDURE — 93000 ELECTROCARDIOGRAM COMPLETE: CPT

## 2023-11-30 PROCEDURE — 99214 OFFICE O/P EST MOD 30 MIN: CPT | Mod: 25

## 2023-12-08 ENCOUNTER — APPOINTMENT (OUTPATIENT)
Dept: GERIATRICS | Facility: CLINIC | Age: 88
End: 2023-12-08
Payer: MEDICARE

## 2023-12-08 VITALS
WEIGHT: 139.13 LBS | HEART RATE: 71 BPM | OXYGEN SATURATION: 98 % | RESPIRATION RATE: 16 BRPM | HEIGHT: 61 IN | TEMPERATURE: 97.8 F | SYSTOLIC BLOOD PRESSURE: 202 MMHG | BODY MASS INDEX: 26.27 KG/M2 | DIASTOLIC BLOOD PRESSURE: 94 MMHG

## 2023-12-08 VITALS — DIASTOLIC BLOOD PRESSURE: 95 MMHG | SYSTOLIC BLOOD PRESSURE: 198 MMHG

## 2023-12-08 DIAGNOSIS — M54.16 RADICULOPATHY, LUMBAR REGION: ICD-10-CM

## 2023-12-08 DIAGNOSIS — R26.9 UNSPECIFIED ABNORMALITIES OF GAIT AND MOBILITY: ICD-10-CM

## 2023-12-08 PROCEDURE — 99204 OFFICE O/P NEW MOD 45 MIN: CPT | Mod: GC

## 2023-12-08 RX ORDER — PNEUMOCOCCAL 23-VAL P-SAC VAC 25MCG/0.5
25 VIAL (ML) INJECTION
Qty: 1 | Refills: 0 | Status: DISCONTINUED | COMMUNITY
Start: 2019-10-08 | End: 2023-12-08

## 2023-12-08 RX ORDER — ROSUVASTATIN CALCIUM 5 MG/1
5 TABLET, FILM COATED ORAL
Qty: 90 | Refills: 0 | Status: DISCONTINUED | COMMUNITY
Start: 2019-10-16 | End: 2023-12-08

## 2023-12-14 ENCOUNTER — APPOINTMENT (OUTPATIENT)
Dept: RADIOLOGY | Facility: CLINIC | Age: 88
End: 2023-12-14
Payer: MEDICARE

## 2023-12-14 PROCEDURE — 77085 DXA BONE DENSITY AXL VRT FX: CPT

## 2023-12-15 ENCOUNTER — LABORATORY RESULT (OUTPATIENT)
Age: 88
End: 2023-12-15

## 2023-12-29 ENCOUNTER — APPOINTMENT (OUTPATIENT)
Dept: CT IMAGING | Facility: IMAGING CENTER | Age: 88
End: 2023-12-29
Payer: MEDICARE

## 2023-12-29 ENCOUNTER — OUTPATIENT (OUTPATIENT)
Dept: OUTPATIENT SERVICES | Facility: HOSPITAL | Age: 88
LOS: 1 days | End: 2023-12-29
Payer: MEDICARE

## 2023-12-29 DIAGNOSIS — R91.1 SOLITARY PULMONARY NODULE: ICD-10-CM

## 2023-12-29 DIAGNOSIS — Z96.641 PRESENCE OF RIGHT ARTIFICIAL HIP JOINT: Chronic | ICD-10-CM

## 2023-12-29 DIAGNOSIS — Z00.8 ENCOUNTER FOR OTHER GENERAL EXAMINATION: ICD-10-CM

## 2023-12-29 PROCEDURE — 71250 CT THORAX DX C-: CPT

## 2023-12-29 PROCEDURE — 71250 CT THORAX DX C-: CPT | Mod: 26

## 2024-01-09 ENCOUNTER — APPOINTMENT (OUTPATIENT)
Dept: PULMONOLOGY | Facility: CLINIC | Age: 89
End: 2024-01-09
Payer: MEDICARE

## 2024-01-09 VITALS
SYSTOLIC BLOOD PRESSURE: 189 MMHG | OXYGEN SATURATION: 99 % | BODY MASS INDEX: 25.94 KG/M2 | TEMPERATURE: 98 F | RESPIRATION RATE: 16 BRPM | HEART RATE: 67 BPM | HEIGHT: 61 IN | WEIGHT: 137.38 LBS | DIASTOLIC BLOOD PRESSURE: 72 MMHG

## 2024-01-09 DIAGNOSIS — R93.89 ABNORMAL FINDINGS ON DIAGNOSTIC IMAGING OF OTHER SPECIFIED BODY STRUCTURES: ICD-10-CM

## 2024-01-09 DIAGNOSIS — Z23 ENCOUNTER FOR IMMUNIZATION: ICD-10-CM

## 2024-01-09 PROCEDURE — G0009: CPT

## 2024-01-09 PROCEDURE — 99213 OFFICE O/P EST LOW 20 MIN: CPT | Mod: GC

## 2024-01-09 PROCEDURE — 90677 PCV20 VACCINE IM: CPT

## 2024-02-06 ENCOUNTER — APPOINTMENT (OUTPATIENT)
Dept: GERIATRICS | Facility: CLINIC | Age: 89
End: 2024-02-06
Payer: MEDICARE

## 2024-02-06 VITALS
WEIGHT: 139 LBS | SYSTOLIC BLOOD PRESSURE: 210 MMHG | HEIGHT: 61 IN | OXYGEN SATURATION: 99 % | BODY MASS INDEX: 26.24 KG/M2 | DIASTOLIC BLOOD PRESSURE: 94 MMHG | HEART RATE: 68 BPM | TEMPERATURE: 97.8 F

## 2024-02-06 DIAGNOSIS — Z71.89 OTHER SPECIFIED COUNSELING: ICD-10-CM

## 2024-02-06 DIAGNOSIS — R91.1 SOLITARY PULMONARY NODULE: ICD-10-CM

## 2024-02-06 LAB
ALBUMIN SERPL ELPH-MCNC: 4.6 G/DL
ALP BLD-CCNC: 103 U/L
ALT SERPL-CCNC: 18 U/L
ANION GAP SERPL CALC-SCNC: 21 MMOL/L
AST SERPL-CCNC: 31 U/L
BASOPHILS # BLD AUTO: 0.01 K/UL
BASOPHILS NFR BLD AUTO: 0.2 %
BILIRUB SERPL-MCNC: 0.4 MG/DL
BUN SERPL-MCNC: 33 MG/DL
CALCIUM SERPL-MCNC: 9.2 MG/DL
CHLORIDE SERPL-SCNC: 101 MMOL/L
CHOLEST SERPL-MCNC: 193 MG/DL
CO2 SERPL-SCNC: 19 MMOL/L
CREAT SERPL-MCNC: 1.07 MG/DL
EGFR: 49 ML/MIN/1.73M2
EOSINOPHIL # BLD AUTO: 0.06 K/UL
EOSINOPHIL NFR BLD AUTO: 1.2 %
GLUCOSE SERPL-MCNC: 88 MG/DL
HCT VFR BLD CALC: 43.3 %
HDLC SERPL-MCNC: 52 MG/DL
HGB BLD-MCNC: 13.5 G/DL
IMM GRANULOCYTES NFR BLD AUTO: 0.2 %
LDLC SERPL CALC-MCNC: 118 MG/DL
LYMPHOCYTES # BLD AUTO: 1.07 K/UL
LYMPHOCYTES NFR BLD AUTO: 21 %
MAN DIFF?: NORMAL
MCHC RBC-ENTMCNC: 28 PG
MCHC RBC-ENTMCNC: 31.2 GM/DL
MCV RBC AUTO: 89.6 FL
MONOCYTES # BLD AUTO: 0.53 K/UL
MONOCYTES NFR BLD AUTO: 10.4 %
NEUTROPHILS # BLD AUTO: 3.41 K/UL
NEUTROPHILS NFR BLD AUTO: 67 %
NONHDLC SERPL-MCNC: 141 MG/DL
PLATELET # BLD AUTO: 158 K/UL
POTASSIUM SERPL-SCNC: 4.5 MMOL/L
PROT SERPL-MCNC: 7.4 G/DL
RBC # BLD: 4.83 M/UL
RBC # FLD: 16.5 %
SODIUM SERPL-SCNC: 141 MMOL/L
TRIGL SERPL-MCNC: 130 MG/DL
WBC # FLD AUTO: 5.09 K/UL

## 2024-02-06 PROCEDURE — 99497 ADVNCD CARE PLAN 30 MIN: CPT | Mod: GC,25

## 2024-02-06 PROCEDURE — 99214 OFFICE O/P EST MOD 30 MIN: CPT | Mod: 25,GC

## 2024-02-06 NOTE — REVIEW OF SYSTEMS
[Recent Weight Gain (___ Lbs)] : recent [unfilled] ~Ulb weight gain [As Noted in HPI] : as noted in HPI [Negative] : Heme/Lymph [FreeTextEntry4] : dry mouth [FreeTextEntry5] : murmur [FreeTextEntry9] : leg swelling

## 2024-02-06 NOTE — PHYSICAL EXAM
[Alert] : alert [Normal Outer Ear/Nose] : the ears and nose were normal in appearance [Normal Appearance] : the appearance of the neck was normal [Supple] : the neck was supple [No Respiratory Distress] : no respiratory distress [No Acc Muscle Use] : no accessory muscle use [Respiration, Rhythm And Depth] : normal respiratory rhythm and effort [Auscultation Breath Sounds / Voice Sounds] : lungs were clear to auscultation bilaterally [Bowel Sounds] : normal bowel sounds [Abdomen Tenderness] : non-tender [Abdomen Soft] : soft [No Spinal Tenderness] : no spinal tenderness [Normal Color / Pigmentation] : normal skin color and pigmentation [Normal Turgor] : normal skin turgor [Normal Affect] : the affect was normal [Normal Mood] : the mood was normal [de-identified] :  JESSY 4-5/6 [de-identified] : B/L leg edema ++/++ [de-identified] : tremors . gait instability with small steps

## 2024-02-06 NOTE — GOALS
[Patient] : patient [Time Spent: ___ minutes] : I, personally, spent [unfilled] minutes on advance care planning services with the patient.  This time is separate and distinct from any other care management services provided on this date [Staff: _____] : staff - [unfilled] [Healthcare proxy document is in chart] : Healthcare proxy document is in chart [FreeTextEntry1] : Sam Chung [FreeTextEntry2] :   [FreeTextEntry3] : [phone] [FreeTextEntry7] : Discussed and defined HCP.   HCP form completed and to be scanned to. EMR.  [FreeTextEntry8] : Sam Chung [FreeTextEntry9] :  [de-identified] :

## 2024-02-06 NOTE — ASSESSMENT
[FreeTextEntry1] : Ms. Melissa Medina is a 89 years old female with PMHx of  HTN, A-fib, AS, CHF/pleural effusion, RLL lung nodule, Fhx of early colon cancer and stomach cancer presenting [facility] [place]. 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 The utility of colonoscopy at her age is not clear, either.  Physical examination is notable for tremor, gait instability, JESSY 4-5/6, and B/L lower leg swelling(improved compared to the previous visit) with chronic venous stasis.    Pulmo note appreciated. CT-Chest is to be repeated during the summer.   BP control is important to reduce afterload given AS. She is taking 40 mg of Lasix daily. No adjustment was made today for BP meds since she did not take medicine this morning, and BP at home has been well as per her. She denies any cp/sob/palps/diaphoresis.  -Avoid salty food -Continue f/u with Cardio/Pulmo -HCP form filled out and to be scanned; Her  Sam -Check CMP today. -Continue monitoring BP and BP control  -PT referral.  -Health maintenance RSV  2023  flu 2023 covid 2023 [id number]  2024 DEXA 12/2023 osteopenia   RTC 2 month Addendum: Lab reviewed. Kidney function strain in CMP. High AG metabolic acidosis? vs. Lab error.  Discussed with the patient: Recommended continuing Lasix for now, given weight gain, lack of alkalosis/volume contraction, and uncontrolled BP, but would need to cut down Lasix sooner or later to protect renal function. Discuss further management with Cardiology and repeat BMP. Agree to hold the Iron pill. Message sent to Cardiology.

## 2024-02-06 NOTE — ASSESSMENT
[FreeTextEntry1] : Ms. Melissa Medina is a 89 years old female with PMHx of  HTN, A-fib, AS, CHF/pleural effusion, RLL lung nodule, Fhx of early colon cancer and stomach cancer presenting [facility] [place]. 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 The utility of colonoscopy at her age is not clear, either.  Physical examination is notable for tremor, gait instability, JESSY 4-5/6, and B/L lower leg swelling(improved compared to the previous visit) with chronic venous stasis.    Pulmo note appreciated. CT-Chest is to be repeated during the summer.   BP control is important to reduce afterload given AS. She is taking 40 mg of Lasix daily. No adjustment was made today for BP meds since she did not take medicine this morning, and BP at home has been well as per her. She denies any cp/sob/palps/diaphoresis.  -Avoid salty food -Continue f/u with Cardio/Pulmo -HCP form filled out and to be scanned; Her  Sam -Check CMP today. -Continue monitoring BP and BP control  -PT referral.  -Health maintenance RSV  2023  flu 2023 covid 2023 [id number]  2024 DEXA 12/2023 osteopenia   RTC 2 month Addendum: Lab reviewed. Kidney function strain in CMP. High AG metabolic acidosis? vs. Lab error.  Discussed with the patient: Recommended continuing Lasix for now, given weight gain, lack of alkalosis/volume contraction, and uncontrolled BP, but would need to cut down Lasix sooner or later to protect renal function. Discuss further management with Cardiology and repeat BMP. Agree to hold the Iron pill. Message sent to Cardiology.

## 2024-02-06 NOTE — GOALS
[Patient] : patient [Time Spent: ___ minutes] : I, personally, spent [unfilled] minutes on advance care planning services with the patient.  This time is separate and distinct from any other care management services provided on this date [Staff: _____] : staff - [unfilled] [Healthcare proxy document is in chart] : Healthcare proxy document is in chart [FreeTextEntry1] : Sam Chung [FreeTextEntry2] :   [FreeTextEntry3] : [phone] [FreeTextEntry7] : Discussed and defined HCP.   HCP form completed and to be scanned to. EMR.  [FreeTextEntry8] : Sam Chung [FreeTextEntry9] :  [de-identified] :

## 2024-02-06 NOTE — PHYSICAL EXAM
[Alert] : alert [Normal Outer Ear/Nose] : the ears and nose were normal in appearance [Normal Appearance] : the appearance of the neck was normal [Supple] : the neck was supple [No Respiratory Distress] : no respiratory distress [No Acc Muscle Use] : no accessory muscle use [Respiration, Rhythm And Depth] : normal respiratory rhythm and effort [Auscultation Breath Sounds / Voice Sounds] : lungs were clear to auscultation bilaterally [Bowel Sounds] : normal bowel sounds [Abdomen Tenderness] : non-tender [Abdomen Soft] : soft [No Spinal Tenderness] : no spinal tenderness [Normal Color / Pigmentation] : normal skin color and pigmentation [Normal Turgor] : normal skin turgor [Normal Affect] : the affect was normal [Normal Mood] : the mood was normal [de-identified] :  JESSY 4-5/6 [de-identified] : B/L leg edema ++/++ [de-identified] : tremors . gait instability with small steps

## 2024-02-06 NOTE — END OF VISIT
[] : Fellow [Time Spent: ___ minutes] : I have spent [unfilled] minutes of time on the encounter. [FreeTextEntry3] : 89 year old woman w/ PMH as above presents for f/u visit. Reports again that home BP monitoring shows consistent SBP ~120s-140s. Did not take antiHTN meds this AM. Discussed with Dr. Thompson need for med adherence, even on days of commuting. Risk of flash pulmonary edema given underlying valvular dx w/ elevated BP. Of note, she has no acute sx during visit today and unchanged chronic LE edema.  Labs reviewed. There is a notable anion gap but otherwise labs are relatively unremarkable. LDL is 118. She is not on statin therapy. HDL is 52.  UTD on vaccinations. She had recent pulm f/u and is stable. She is pending cards f/u. She will RTC in 3 months.

## 2024-02-06 NOTE — REASON FOR VISIT
[Initial Evaluation] : an initial evaluation [FreeTextEntry1] : looking for new PCP [Follow-Up] : a follow-up visit [Spouse] : spouse

## 2024-02-06 NOTE — HISTORY OF PRESENT ILLNESS
[One fall no injury in past year] : Patient reported one fall in the past year without injury [Patient is independent with] : bathing [] : managing medications [Independent] : managing finances [Cane] : cane [Grab Bars] : grab bars [Shower Chair] : shower chair [Night Light] : night light [0] : 2) Feeling down, depressed, or hopeless: Not at all (0) [Designated Healthcare Proxy] : Designated healthcare proxy [FreeTextEntry1] : Ms. Melissa Medina is a 89 years old female with PMHx of  HTN, A-fib, AS, CHF/pleural effusion, RLL lung nodule, Fhx of early colon cancer and stomach cancer presenting [facility] [place]. She was in her usual health condition until , when she developed cardiopulmonary symptoms. Her family history is significant for her father, who  of colon cancer at age 52( dx at the age of 51?), and her sister, who  of stomach cancer (Dxed at the age of 29), and multiple family members with Parkinson's disease. 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   She is living with her  Sam and enjoying life fishing. Last time, she caught a shark at [employer]. She is fully functional in ADLs/IADLs without any cognitive dysfunction.   She gained 4-5 lbs since the last visit. Her BP was again noticed to be high in the office. She did not take medicine this morning, including Lasix, because she did not want to go to the bathroom on the way to the clinic. She denies any cp/sob/palps/diaphoresis.          [TextBox_25] : 5 years ago [TextBox_31] : no heaering problem  [TextBox_37] : f/u for cataract [TextBox_43] : goes to dental regularly [TextBox_19] : 10 years ago [FreeTextEntry2] : Dermatology follow up s/p atypical skin lesion [Driving Concerns] : not driving or driving without noted concerns [[facility] : >12 sec [FreeTextEntry4] : Sam Chung

## 2024-02-06 NOTE — HISTORY OF PRESENT ILLNESS
[One fall no injury in past year] : Patient reported one fall in the past year without injury [Patient is independent with] : bathing [] : managing medications [Independent] : managing finances [Cane] : cane [Grab Bars] : grab bars [Shower Chair] : shower chair [Night Light] : night light [0] : 2) Feeling down, depressed, or hopeless: Not at all (0) [Designated Healthcare Proxy] : Designated healthcare proxy [FreeTextEntry1] : Ms. Melissa Medina is a 89 years old female with PMHx of  HTN, A-fib, AS, CHF/pleural effusion, RLL lung nodule, Fhx of early colon cancer and stomach cancer presenting [facility] [place]. She was in her usual health condition until , when she developed cardiopulmonary symptoms. Her family history is significant for her father, who  of colon cancer at age 52( dx at the age of 51?), and her sister, who  of stomach cancer (Dxed at the age of 29), and multiple family members with Parkinson's disease. 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   She is living with her  Sam and enjoying life fishing. Last time, she caught a shark at [place]. She is fully functional in ADLs/IADLs without any cognitive dysfunction.   She gained 4-5 lbs since the last visit. Her BP was again noticed to be high in the office. She did not take medicine this morning, including Lasix, because she did not want to go to the bathroom on the way to the clinic. She denies any cp/sob/palps/diaphoresis.          [TextBox_25] : 5 years ago [TextBox_31] : no heaering problem  [TextBox_37] : f/u for cataract [TextBox_43] : goes to dental regularly [TextBox_19] : 10 years ago [FreeTextEntry2] : Dermatology follow up s/p atypical skin lesion [Driving Concerns] : not driving or driving without noted concerns [[facility] [place]] : >12 sec [FreeTextEntry4] : Sam Chung

## 2024-02-14 ENCOUNTER — APPOINTMENT (OUTPATIENT)
Dept: PULMONOLOGY | Facility: CLINIC | Age: 89
End: 2024-02-14
Payer: MEDICARE

## 2024-02-14 PROCEDURE — 36415 COLL VENOUS BLD VENIPUNCTURE: CPT

## 2024-02-15 LAB
ANION GAP SERPL CALC-SCNC: 14 MMOL/L
BUN SERPL-MCNC: 29 MG/DL
CALCIUM SERPL-MCNC: 9.7 MG/DL
CHLORIDE SERPL-SCNC: 100 MMOL/L
CO2 SERPL-SCNC: 26 MMOL/L
CREAT SERPL-MCNC: 1.11 MG/DL
EGFR: 47 ML/MIN/1.73M2
GLUCOSE SERPL-MCNC: 113 MG/DL
POTASSIUM SERPL-SCNC: 4.1 MMOL/L
SODIUM SERPL-SCNC: 140 MMOL/L

## 2024-03-27 ENCOUNTER — APPOINTMENT (OUTPATIENT)
Dept: CARDIOLOGY | Facility: CLINIC | Age: 89
End: 2024-03-27
Payer: MEDICARE

## 2024-03-27 VITALS
HEART RATE: 58 BPM | OXYGEN SATURATION: 98 % | DIASTOLIC BLOOD PRESSURE: 86 MMHG | WEIGHT: 141 LBS | BODY MASS INDEX: 26.64 KG/M2 | SYSTOLIC BLOOD PRESSURE: 190 MMHG

## 2024-03-27 PROCEDURE — G2211 COMPLEX E/M VISIT ADD ON: CPT

## 2024-03-27 PROCEDURE — 93000 ELECTROCARDIOGRAM COMPLETE: CPT

## 2024-03-27 PROCEDURE — 99214 OFFICE O/P EST MOD 30 MIN: CPT

## 2024-03-27 NOTE — PHYSICAL EXAM
[Normal Venous Pressure] : normal venous pressure [Normal S1, S2] : normal S1, S2 [Clear Lung Fields] : clear lung fields [No Edema] : no edema [Normal] : alert and oriented, normal memory [de-identified] : elderly woman in no distress [de-identified] : 2/6 JESSY at [facility]. [de-identified] : walks with a cane

## 2024-03-27 NOTE — REVIEW OF SYSTEMS
[Feeling Fatigued] : feeling fatigued [Blurry Vision] : no blurred vision [SOB] : no shortness of breath [Dyspnea on exertion] : dyspnea during exertion [Lower Ext Edema] : lower extremity edema [Joint Pain] : joint pain [Negative] : Heme/Lymph [FreeTextEntry6] : dyspnea

## 2024-03-27 NOTE — DISCUSSION/SUMMARY
[FreeTextEntry1] : She is an 89-year-old with a history of chronic atrial fibrillation, hypertension with LVH and moderate aortic stenosis who had recent hospitalization for heart failure with preserved ejection fraction. HFpEF:Appears well compensated currently on her current regimen including 40 mg of Lasix daily and ramipril. Weight is stable.  Hypertension: Still too high. Will try ARB - Valsartan 80 mg qd instead of ramipril, Continue Lasix. Labs and BP at pmd in 2 weeks. Moderate aortic stenosis: No signs of decompensation currently.  Annual echo.   Atrial fibrillation: Chronic.  Heart rate well controlled on her current dose of metoprolol.  She still adamantly refuses to consider oral anticoagulation despite the known increased stroke risk.   Encouraged exercise. PMD appointment in 2 week. [EKG obtained to assist in diagnosis and management of assessed problem(s)] : EKG obtained to assist in diagnosis and management of assessed problem(s)

## 2024-03-27 NOTE — HISTORY OF PRESENT ILLNESS
[FreeTextEntry1] : Taking lasix 40 qd. Not limited by her dyspnea. BUN/CR is 29/1.1  Prior: Recent derm procedure. Recent UTI. Tx with Nitrofurantoin. No GRIFFITH. Less walking than she would like. No orthopnea or PND. No dizziness or falls.  Prior: [facility], Thank you for referring her for cardiac management. She is an 89 year old with a history of AF, HTN, and pleural effusion who was recently hospitalized. She was hospitalized for fever and volume overload. Evaluation at that time showed on echo was moderate AS and LVH. Hyponatremia resolved. She was discharged on a diuretic and it was increased by you recently. She has chronic atrial fibrillation. Not on anticoagulation. She has a history of left pleurodesis. No know history of CAD, DM, smoking.

## 2024-04-16 ENCOUNTER — APPOINTMENT (OUTPATIENT)
Dept: GERIATRICS | Facility: CLINIC | Age: 89
End: 2024-04-16
Payer: MEDICARE

## 2024-04-16 VITALS
TEMPERATURE: 97.3 F | DIASTOLIC BLOOD PRESSURE: 80 MMHG | OXYGEN SATURATION: 99 % | HEART RATE: 62 BPM | HEIGHT: 61 IN | BODY MASS INDEX: 26.34 KG/M2 | WEIGHT: 139.5 LBS | SYSTOLIC BLOOD PRESSURE: 198 MMHG

## 2024-04-16 DIAGNOSIS — I35.0 NONRHEUMATIC AORTIC (VALVE) STENOSIS: ICD-10-CM

## 2024-04-16 DIAGNOSIS — I10 ESSENTIAL (PRIMARY) HYPERTENSION: ICD-10-CM

## 2024-04-16 DIAGNOSIS — I50.30 UNSPECIFIED DIASTOLIC (CONGESTIVE) HEART FAILURE: ICD-10-CM

## 2024-04-16 PROCEDURE — 99214 OFFICE O/P EST MOD 30 MIN: CPT | Mod: GC

## 2024-04-16 PROCEDURE — G2211 COMPLEX E/M VISIT ADD ON: CPT

## 2024-04-16 NOTE — END OF VISIT
[] : Fellow [FreeTextEntry3] : 89 year old woman w/ PMH as above presents for follow-up visit.  Blood pressure is significantly elevated on today's visit again.  Similar to prior visits, she did not take either Lasix or her ramipril prior to coming.  She was concerned about potential increase in urination.  Again counseled that she should be using these medications especially the ACE inhibitor before traveling.  At her recent cardiology visit it was recommended to transition from ramipril to valsartan.  Patient did not make this changes continue ramipril 10 mg daily.  She is hesitant to make the switch to valsartan.  She continues to report that her home blood pressure readings are consistently systolics in the 140s.  Given this we will continue ramipril 10 mg daily.  She will continue to monitor home blood pressure and she will bring her blood pressure cuff for verification at a follow-up visit in 4 weeks.  Stressed that blood pressure control is essential given her underlying valvular disease and HFpEF.  Patient and her  voiced understanding.  We will check labs today to screen for electrolytes and kidney function. [Time Spent: ___ minutes] : I have spent [unfilled] minutes of time on the encounter.

## 2024-04-16 NOTE — ASSESSMENT
[FreeTextEntry1] : Ms. Melissa Medina is a 89 years old female with PMHx of  HTN, A-fib, AS, CHF/pleural effusion, RLL lung nodule, Fhx of early colon cancer and stomach cancer presenting to [facility] for [place]. 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 The utility of colonoscopy at her age is not clear, either.  Physical examination is notable for tremor, gait instability, JESSY 4-5/6, and B/L lower leg swelling(improved a   lot compared to the previous visit) with chronic venous stasis.    Cardio note appreciated: Recommended BP med change from Ramipril to Valsartan. Continue Lasix. Melissa declines oral anticoagulant.   Pulmo note appreciated. CT-Chest is to be repeated during the summer.   BP control is important to reduce afterload given AS. She is taking 40 mg of Lasix daily. No adjustment was made today for BP meds since she did not take medicine this morning, and BP at home has been well as per her. She denies any cp/sob/palps/diaphoresis. Advised to take medicines on visiting day, bring BP machine and follow up with us next month. She looks clinically stable.   -Check CBC, CMP today. -Take medicine on visit day and bring BP machine next month -Avoid salty food -Continue f/u with Cardio/Pulmo -HCP form filled out and supposed to be scanned previously; Her  Sam -PT referred previously  -Health maintenance RSV  2023  flu 2023 covid 2023 [id number]  2024 DEXA 12/2023 osteopenia   RTC 1 month

## 2024-04-16 NOTE — HISTORY OF PRESENT ILLNESS
[One fall no injury in past year] : Patient reported one fall in the past year without injury [Patient is independent with] : bathing [] : managing medications [Independent] : managing finances [Cane] : cane [Grab Bars] : grab bars [Shower Chair] : shower chair [Night Light] : night light [0] : 2) Feeling down, depressed, or hopeless: Not at all (0) [Designated Healthcare Proxy] : Designated healthcare proxy [FreeTextEntry1] : Ms. Melissa Medina is a 89 years old female with PMHx of  HTN, A-fib, AS, CHF/pleural effusion, RLL lung nodule, Fhx of early colon cancer and stomach cancer presenting to [facility] [place]. She was in her usual health condition until , when she developed cardiopulmonary symptoms. Her family history is significant for her father, who  of colon cancer at age 52( dx at the age of 51?), and her sister, who  of stomach cancer (Dxed at the age of 29), and multiple family members with Parkinson's disease. 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   She is living with her  Sam and enjoying life fishing. She is fully functional in ADLs/IADLs without any cognitive dysfunction.   Her weight is stable. Cardiology recommended Valsartan 80mg instead of Ramipril. But, she has not changed the medicine yet. She has history of bad reaction with multiple medicines and she wishes to continue Ramipril. Her BP at home is 140's usually. She did not take medicine this morning, including Lasix, because she did not want to go to the bathroom on the way to the clinic.          [TextBox_25] : 5 years ago [TextBox_31] : no heaering problem  [TextBox_37] : f/u for cataract [TextBox_43] : goes to dental regularly [TextBox_19] : 10 years ago [FreeTextEntry2] : Dermatology follow up s/p atypical skin lesion [Driving Concerns] : not driving or driving without noted concerns [facility] : >12 sec [FreeTextEntry4] : Sam Chung

## 2024-04-16 NOTE — PHYSICAL EXAM
[Alert] : alert [Normal Outer Ear/Nose] : the ears and nose were normal in appearance [Normal Appearance] : the appearance of the neck was normal [Supple] : the neck was supple [No Respiratory Distress] : no respiratory distress [No Acc Muscle Use] : no accessory muscle use [Respiration, Rhythm And Depth] : normal respiratory rhythm and effort [Auscultation Breath Sounds / Voice Sounds] : lungs were clear to auscultation bilaterally [Bowel Sounds] : normal bowel sounds [Abdomen Tenderness] : non-tender [Abdomen Soft] : soft [No Spinal Tenderness] : no spinal tenderness [Normal Color / Pigmentation] : normal skin color and pigmentation [Normal Turgor] : normal skin turgor [Normal Affect] : the affect was normal [Normal Mood] : the mood was normal [___ +] : bilateral [unfilled]+ pitting edema to the ankles [de-identified] :  JESSY 4-5/6 [de-identified] : tremors . gait instability with small steps

## 2024-04-16 NOTE — HISTORY OF PRESENT ILLNESS
[One fall no injury in past year] : Patient reported one fall in the past year without injury [Patient is independent with] : bathing [] : managing medications [Independent] : managing finances [Cane] : cane [Grab Bars] : grab bars [Shower Chair] : shower chair [Night Light] : night light [0] : 2) Feeling down, depressed, or hopeless: Not at all (0) [Designated Healthcare Proxy] : Designated healthcare proxy [FreeTextEntry1] : Ms. Melissa Medina is a 89 years old female with PMHx of  HTN, A-fib, AS, CHF/pleural effusion, RLL lung nodule, Fhx of early colon cancer and stomach cancer presenting to [facility] for [place]. She was in her usual health condition until , when she developed cardiopulmonary symptoms. Her family history is significant for her father, who  of colon cancer at age 52( dx at the age of 51?), and her sister, who  of stomach cancer (Dxed at the age of 29), and multiple family members with Parkinson's disease. 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   She is living with her  Sam and enjoying life fishing. She is fully functional in ADLs/IADLs without any cognitive dysfunction.   Her weight is stable. Cardiology recommended Valsartan 80mg instead of Ramipril. But, she has not changed the medicine yet. She has history of bad reaction with multiple medicines and she wishes to continue Ramipril. Her BP at home is 140's usually. She did not take medicine this morning, including Lasix, because she did not want to go to the bathroom on the way to the clinic.          [TextBox_25] : 5 years ago [TextBox_31] : no heaering problem  [TextBox_37] : f/u for cataract [TextBox_43] : goes to dental regularly [TextBox_19] : 10 years ago [FreeTextEntry2] : Dermatology follow up s/p atypical skin lesion [Driving Concerns] : not driving or driving without noted concerns [facility] : >12 sec [FreeTextEntry4] : Sam Chung

## 2024-04-16 NOTE — PHYSICAL EXAM
[Alert] : alert [Normal Outer Ear/Nose] : the ears and nose were normal in appearance [Normal Appearance] : the appearance of the neck was normal [Supple] : the neck was supple [No Respiratory Distress] : no respiratory distress [No Acc Muscle Use] : no accessory muscle use [Respiration, Rhythm And Depth] : normal respiratory rhythm and effort [Auscultation Breath Sounds / Voice Sounds] : lungs were clear to auscultation bilaterally [Bowel Sounds] : normal bowel sounds [Abdomen Tenderness] : non-tender [Abdomen Soft] : soft [No Spinal Tenderness] : no spinal tenderness [Normal Color / Pigmentation] : normal skin color and pigmentation [Normal Turgor] : normal skin turgor [Normal Affect] : the affect was normal [Normal Mood] : the mood was normal [___ +] : bilateral [unfilled]+ pitting edema to the ankles [de-identified] :  JESSY 4-5/6 [de-identified] : tremors . gait instability with small steps

## 2024-04-16 NOTE — ASSESSMENT
[FreeTextEntry1] : Ms. Melissa Medina is a 89 years old female with PMHx of  HTN, A-fib, AS, CHF/pleural effusion, RLL lung nodule, Fhx of early colon cancer and stomach cancer presenting to [facility] for [place] care. 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 The utility of colonoscopy at her age is not clear, either.  Physical examination is notable for tremor, gait instability, JESSY 4-5/6, and B/L lower leg swelling(improved a   lot compared to the previous visit) with chronic venous stasis.    Cardio note appreciated: Recommended BP med change from Ramipril to Valsartan. Continue Lasix. Melissa declines oral anticoagulant.   Pulmo note appreciated. CT-Chest is to be repeated during the summer.   BP control is important to reduce afterload given AS. She is taking 40 mg of Lasix daily. No adjustment was made today for BP meds since she did not take medicine this morning, and BP at home has been well as per her. She denies any cp/sob/palps/diaphoresis. Advised to take medicines on visiting day, bring BP machine and follow up with us next month. She looks clinically stable.   -Check CBC, CMP today. -Take medicine on visit day and bring BP machine next month -Avoid salty food -Continue f/u with Cardio/Pulmo -HCP form filled out and supposed to be scanned previously; Her  Sam -PT referred previously  -Health maintenance RSV  2023  flu 2023 covid 2023 [id number]  2024 DEXA 12/2023 osteopenia   RTC 1 month

## 2024-04-17 LAB
ALBUMIN SERPL ELPH-MCNC: 4.6 G/DL
ALP BLD-CCNC: 102 U/L
ALT SERPL-CCNC: 21 U/L
ANION GAP SERPL CALC-SCNC: 13 MMOL/L
AST SERPL-CCNC: 28 U/L
BASOPHILS # BLD AUTO: 0.03 K/UL
BASOPHILS NFR BLD AUTO: 0.6 %
BILIRUB SERPL-MCNC: 0.4 MG/DL
BUN SERPL-MCNC: 29 MG/DL
CALCIUM SERPL-MCNC: 9.4 MG/DL
CHLORIDE SERPL-SCNC: 100 MMOL/L
CO2 SERPL-SCNC: 26 MMOL/L
CREAT SERPL-MCNC: 1.08 MG/DL
EGFR: 49 ML/MIN/1.73M2
EOSINOPHIL # BLD AUTO: 0.18 K/UL
EOSINOPHIL NFR BLD AUTO: 3.7 %
GLUCOSE SERPL-MCNC: 99 MG/DL
HCT VFR BLD CALC: 42.7 %
HGB BLD-MCNC: 13.6 G/DL
IMM GRANULOCYTES NFR BLD AUTO: 0.2 %
LYMPHOCYTES # BLD AUTO: 1.05 K/UL
LYMPHOCYTES NFR BLD AUTO: 21.7 %
MAN DIFF?: NORMAL
MCHC RBC-ENTMCNC: 29.4 PG
MCHC RBC-ENTMCNC: 31.9 GM/DL
MCV RBC AUTO: 92.4 FL
MONOCYTES # BLD AUTO: 0.53 K/UL
MONOCYTES NFR BLD AUTO: 11 %
NEUTROPHILS # BLD AUTO: 3.03 K/UL
NEUTROPHILS NFR BLD AUTO: 62.8 %
PLATELET # BLD AUTO: 188 K/UL
POTASSIUM SERPL-SCNC: 4.5 MMOL/L
PROT SERPL-MCNC: 7.2 G/DL
RBC # BLD: 4.62 M/UL
RBC # FLD: 14.7 %
SODIUM SERPL-SCNC: 139 MMOL/L
WBC # FLD AUTO: 4.83 K/UL

## 2024-05-01 ENCOUNTER — TRANSCRIPTION ENCOUNTER (OUTPATIENT)
Age: 89
End: 2024-05-01

## 2024-05-01 ENCOUNTER — APPOINTMENT (OUTPATIENT)
Dept: NEUROLOGY | Facility: HOSPITAL | Age: 89
End: 2024-05-01

## 2024-05-01 ENCOUNTER — EMERGENCY (EMERGENCY)
Facility: HOSPITAL | Age: 89
LOS: 1 days | Discharge: ANOTHER HOSPITAL | End: 2024-05-01
Attending: STUDENT IN AN ORGANIZED HEALTH CARE EDUCATION/TRAINING PROGRAM | Admitting: STUDENT IN AN ORGANIZED HEALTH CARE EDUCATION/TRAINING PROGRAM
Payer: MEDICARE

## 2024-05-01 ENCOUNTER — INPATIENT (INPATIENT)
Facility: HOSPITAL | Age: 89
LOS: 6 days | Discharge: HOSPICE | DRG: 66 | End: 2024-05-08
Attending: HOSPITALIST | Admitting: SPECIALIST
Payer: MEDICARE

## 2024-05-01 VITALS
TEMPERATURE: 98 F | WEIGHT: 142.2 LBS | SYSTOLIC BLOOD PRESSURE: 108 MMHG | HEART RATE: 74 BPM | OXYGEN SATURATION: 100 % | DIASTOLIC BLOOD PRESSURE: 68 MMHG | HEIGHT: 64 IN | RESPIRATION RATE: 19 BRPM

## 2024-05-01 VITALS
SYSTOLIC BLOOD PRESSURE: 178 MMHG | TEMPERATURE: 97 F | OXYGEN SATURATION: 96 % | WEIGHT: 141.1 LBS | RESPIRATION RATE: 17 BRPM | HEART RATE: 60 BPM | DIASTOLIC BLOOD PRESSURE: 80 MMHG

## 2024-05-01 VITALS
OXYGEN SATURATION: 98 % | DIASTOLIC BLOOD PRESSURE: 68 MMHG | SYSTOLIC BLOOD PRESSURE: 153 MMHG | TEMPERATURE: 98 F | HEART RATE: 74 BPM | RESPIRATION RATE: 18 BRPM

## 2024-05-01 DIAGNOSIS — Z96.641 PRESENCE OF RIGHT ARTIFICIAL HIP JOINT: Chronic | ICD-10-CM

## 2024-05-01 DIAGNOSIS — I63.9 CEREBRAL INFARCTION, UNSPECIFIED: ICD-10-CM

## 2024-05-01 LAB
ALBUMIN SERPL ELPH-MCNC: 4 G/DL — SIGNIFICANT CHANGE UP (ref 3.3–5)
ALP SERPL-CCNC: 99 U/L — SIGNIFICANT CHANGE UP (ref 40–120)
ALT FLD-CCNC: 31 U/L — SIGNIFICANT CHANGE UP (ref 12–78)
ANION GAP SERPL CALC-SCNC: 7 MMOL/L — SIGNIFICANT CHANGE UP (ref 5–17)
APTT BLD: 36.8 SEC — HIGH (ref 24.5–35.6)
AST SERPL-CCNC: 31 U/L — SIGNIFICANT CHANGE UP (ref 15–37)
BASOPHILS # BLD AUTO: 0.01 K/UL — SIGNIFICANT CHANGE UP (ref 0–0.2)
BASOPHILS NFR BLD AUTO: 0.2 % — SIGNIFICANT CHANGE UP (ref 0–2)
BILIRUB SERPL-MCNC: 0.6 MG/DL — SIGNIFICANT CHANGE UP (ref 0.2–1.2)
BUN SERPL-MCNC: 31 MG/DL — HIGH (ref 7–23)
CALCIUM SERPL-MCNC: 9.6 MG/DL — SIGNIFICANT CHANGE UP (ref 8.5–10.1)
CHLORIDE SERPL-SCNC: 108 MMOL/L — SIGNIFICANT CHANGE UP (ref 96–108)
CO2 SERPL-SCNC: 26 MMOL/L — SIGNIFICANT CHANGE UP (ref 22–31)
CREAT SERPL-MCNC: 1.2 MG/DL — SIGNIFICANT CHANGE UP (ref 0.5–1.3)
EGFR: 43 ML/MIN/1.73M2 — LOW
EOSINOPHIL # BLD AUTO: 0.09 K/UL — SIGNIFICANT CHANGE UP (ref 0–0.5)
EOSINOPHIL NFR BLD AUTO: 1.5 % — SIGNIFICANT CHANGE UP (ref 0–6)
GLUCOSE BLDC GLUCOMTR-MCNC: 102 MG/DL — HIGH (ref 70–99)
GLUCOSE BLDC GLUCOMTR-MCNC: 110 MG/DL — HIGH (ref 70–99)
GLUCOSE SERPL-MCNC: 128 MG/DL — HIGH (ref 70–99)
HCT VFR BLD CALC: 42.4 % — SIGNIFICANT CHANGE UP (ref 34.5–45)
HGB BLD-MCNC: 13.9 G/DL — SIGNIFICANT CHANGE UP (ref 11.5–15.5)
IMM GRANULOCYTES NFR BLD AUTO: 0.7 % — SIGNIFICANT CHANGE UP (ref 0–0.9)
INR BLD: 0.96 RATIO — SIGNIFICANT CHANGE UP (ref 0.85–1.18)
LYMPHOCYTES # BLD AUTO: 0.75 K/UL — LOW (ref 1–3.3)
LYMPHOCYTES # BLD AUTO: 12.8 % — LOW (ref 13–44)
MAGNESIUM SERPL-MCNC: 2.2 MG/DL — SIGNIFICANT CHANGE UP (ref 1.6–2.6)
MCHC RBC-ENTMCNC: 29.6 PG — SIGNIFICANT CHANGE UP (ref 27–34)
MCHC RBC-ENTMCNC: 32.8 GM/DL — SIGNIFICANT CHANGE UP (ref 32–36)
MCV RBC AUTO: 90.4 FL — SIGNIFICANT CHANGE UP (ref 80–100)
MONOCYTES # BLD AUTO: 0.4 K/UL — SIGNIFICANT CHANGE UP (ref 0–0.9)
MONOCYTES NFR BLD AUTO: 6.8 % — SIGNIFICANT CHANGE UP (ref 2–14)
NEUTROPHILS # BLD AUTO: 4.57 K/UL — SIGNIFICANT CHANGE UP (ref 1.8–7.4)
NEUTROPHILS NFR BLD AUTO: 78 % — HIGH (ref 43–77)
NRBC # BLD: 0 /100 WBCS — SIGNIFICANT CHANGE UP (ref 0–0)
NT-PROBNP SERPL-SCNC: 1544 PG/ML — HIGH (ref 0–450)
PHOSPHATE SERPL-MCNC: 2.9 MG/DL — SIGNIFICANT CHANGE UP (ref 2.5–4.5)
PLATELET # BLD AUTO: 156 K/UL — SIGNIFICANT CHANGE UP (ref 150–400)
POTASSIUM SERPL-MCNC: 4.2 MMOL/L — SIGNIFICANT CHANGE UP (ref 3.5–5.3)
POTASSIUM SERPL-SCNC: 4.2 MMOL/L — SIGNIFICANT CHANGE UP (ref 3.5–5.3)
PROT SERPL-MCNC: 8.1 G/DL — SIGNIFICANT CHANGE UP (ref 6–8.3)
PROTHROM AB SERPL-ACNC: 11.3 SEC — SIGNIFICANT CHANGE UP (ref 9.5–13)
RBC # BLD: 4.69 M/UL — SIGNIFICANT CHANGE UP (ref 3.8–5.2)
RBC # FLD: 14.2 % — SIGNIFICANT CHANGE UP (ref 10.3–14.5)
SODIUM SERPL-SCNC: 141 MMOL/L — SIGNIFICANT CHANGE UP (ref 135–145)
TROPONIN I, HIGH SENSITIVITY RESULT: 16.5 NG/L — SIGNIFICANT CHANGE UP
WBC # BLD: 5.86 K/UL — SIGNIFICANT CHANGE UP (ref 3.8–10.5)
WBC # FLD AUTO: 5.86 K/UL — SIGNIFICANT CHANGE UP (ref 3.8–10.5)

## 2024-05-01 PROCEDURE — 80053 COMPREHEN METABOLIC PANEL: CPT

## 2024-05-01 PROCEDURE — 70450 CT HEAD/BRAIN W/O DYE: CPT | Mod: MC

## 2024-05-01 PROCEDURE — 36224 PLACE CATH CAROTD ART: CPT | Mod: LT

## 2024-05-01 PROCEDURE — 84484 ASSAY OF TROPONIN QUANT: CPT

## 2024-05-01 PROCEDURE — 85025 COMPLETE CBC W/AUTO DIFF WBC: CPT

## 2024-05-01 PROCEDURE — 83880 ASSAY OF NATRIURETIC PEPTIDE: CPT

## 2024-05-01 PROCEDURE — 99291 CRITICAL CARE FIRST HOUR: CPT

## 2024-05-01 PROCEDURE — 83735 ASSAY OF MAGNESIUM: CPT

## 2024-05-01 PROCEDURE — 0042T: CPT | Mod: MC

## 2024-05-01 PROCEDURE — 93010 ELECTROCARDIOGRAM REPORT: CPT

## 2024-05-01 PROCEDURE — 84100 ASSAY OF PHOSPHORUS: CPT

## 2024-05-01 PROCEDURE — 70496 CT ANGIOGRAPHY HEAD: CPT | Mod: 26,MC

## 2024-05-01 PROCEDURE — 71045 X-RAY EXAM CHEST 1 VIEW: CPT | Mod: 26

## 2024-05-01 PROCEDURE — 82962 GLUCOSE BLOOD TEST: CPT

## 2024-05-01 PROCEDURE — 36415 COLL VENOUS BLD VENIPUNCTURE: CPT

## 2024-05-01 PROCEDURE — 85610 PROTHROMBIN TIME: CPT

## 2024-05-01 PROCEDURE — 96375 TX/PRO/DX INJ NEW DRUG ADDON: CPT | Mod: XU

## 2024-05-01 PROCEDURE — 99291 CRITICAL CARE FIRST HOUR: CPT | Mod: 25

## 2024-05-01 PROCEDURE — 70498 CT ANGIOGRAPHY NECK: CPT | Mod: 26,MC

## 2024-05-01 PROCEDURE — 96376 TX/PRO/DX INJ SAME DRUG ADON: CPT | Mod: XU

## 2024-05-01 PROCEDURE — 71045 X-RAY EXAM CHEST 1 VIEW: CPT

## 2024-05-01 PROCEDURE — 70496 CT ANGIOGRAPHY HEAD: CPT | Mod: MC

## 2024-05-01 PROCEDURE — 70450 CT HEAD/BRAIN W/O DYE: CPT | Mod: 26,XU,MC

## 2024-05-01 PROCEDURE — 85730 THROMBOPLASTIN TIME PARTIAL: CPT

## 2024-05-01 PROCEDURE — 93005 ELECTROCARDIOGRAM TRACING: CPT

## 2024-05-01 PROCEDURE — 96374 THER/PROPH/DIAG INJ IV PUSH: CPT | Mod: XU

## 2024-05-01 PROCEDURE — 70498 CT ANGIOGRAPHY NECK: CPT | Mod: MC

## 2024-05-01 RX ORDER — LABETALOL HCL 100 MG
10 TABLET ORAL ONCE
Refills: 0 | Status: COMPLETED | OUTPATIENT
Start: 2024-05-01 | End: 2024-05-01

## 2024-05-01 RX ORDER — SODIUM CHLORIDE 9 MG/ML
10 INJECTION INTRAMUSCULAR; INTRAVENOUS; SUBCUTANEOUS ONCE
Refills: 0 | Status: COMPLETED | OUTPATIENT
Start: 2024-05-01 | End: 2024-05-01

## 2024-05-01 RX ORDER — METOPROLOL TARTRATE 50 MG
1 TABLET ORAL
Refills: 0 | DISCHARGE

## 2024-05-01 RX ORDER — SODIUM CHLORIDE 9 MG/ML
1000 INJECTION INTRAMUSCULAR; INTRAVENOUS; SUBCUTANEOUS
Refills: 0 | Status: DISCONTINUED | OUTPATIENT
Start: 2024-05-01 | End: 2024-05-02

## 2024-05-01 RX ORDER — DEXTROSE 50 % IN WATER 50 %
12.5 SYRINGE (ML) INTRAVENOUS ONCE
Refills: 0 | Status: DISCONTINUED | OUTPATIENT
Start: 2024-05-01 | End: 2024-05-08

## 2024-05-01 RX ORDER — SODIUM CHLORIDE 9 MG/ML
1000 INJECTION, SOLUTION INTRAVENOUS
Refills: 0 | Status: DISCONTINUED | OUTPATIENT
Start: 2024-05-01 | End: 2024-05-02

## 2024-05-01 RX ORDER — DEXTROSE 50 % IN WATER 50 %
15 SYRINGE (ML) INTRAVENOUS ONCE
Refills: 0 | Status: DISCONTINUED | OUTPATIENT
Start: 2024-05-01 | End: 2024-05-02

## 2024-05-01 RX ORDER — NICARDIPINE HYDROCHLORIDE 30 MG/1
2.5 CAPSULE, EXTENDED RELEASE ORAL
Qty: 40 | Refills: 0 | Status: DISCONTINUED | OUTPATIENT
Start: 2024-05-01 | End: 2024-05-04

## 2024-05-01 RX ORDER — GLUCAGON INJECTION, SOLUTION 0.5 MG/.1ML
1 INJECTION, SOLUTION SUBCUTANEOUS ONCE
Refills: 0 | Status: DISCONTINUED | OUTPATIENT
Start: 2024-05-01 | End: 2024-05-02

## 2024-05-01 RX ORDER — ASCORBIC ACID 60 MG
2 TABLET,CHEWABLE ORAL
Refills: 0 | DISCHARGE

## 2024-05-01 RX ORDER — RAMIPRIL 5 MG
1 CAPSULE ORAL
Refills: 0 | DISCHARGE

## 2024-05-01 RX ORDER — TENECTEPLASE 50 MG
16 KIT INTRAVENOUS ONCE
Refills: 0 | Status: COMPLETED | OUTPATIENT
Start: 2024-05-01 | End: 2024-05-01

## 2024-05-01 RX ORDER — INSULIN LISPRO 100/ML
VIAL (ML) SUBCUTANEOUS EVERY 6 HOURS
Refills: 0 | Status: DISCONTINUED | OUTPATIENT
Start: 2024-05-01 | End: 2024-05-08

## 2024-05-01 RX ORDER — DEXTROSE 50 % IN WATER 50 %
25 SYRINGE (ML) INTRAVENOUS ONCE
Refills: 0 | Status: DISCONTINUED | OUTPATIENT
Start: 2024-05-01 | End: 2024-05-08

## 2024-05-01 RX ORDER — DEXTROSE 10 % IN WATER 10 %
125 INTRAVENOUS SOLUTION INTRAVENOUS ONCE
Refills: 0 | Status: DISCONTINUED | OUTPATIENT
Start: 2024-05-01 | End: 2024-05-02

## 2024-05-01 RX ADMIN — SODIUM CHLORIDE 10 MILLILITER(S): 9 INJECTION INTRAMUSCULAR; INTRAVENOUS; SUBCUTANEOUS at 10:54

## 2024-05-01 RX ADMIN — TENECTEPLASE 2304 MILLIGRAM(S): KIT at 10:33

## 2024-05-01 RX ADMIN — NICARDIPINE HYDROCHLORIDE 12.5 MG/HR: 30 CAPSULE, EXTENDED RELEASE ORAL at 10:40

## 2024-05-01 RX ADMIN — Medication 10 MILLIGRAM(S): at 10:53

## 2024-05-01 RX ADMIN — Medication 10 MILLIGRAM(S): at 10:06

## 2024-05-01 RX ADMIN — SODIUM CHLORIDE 50 MILLILITER(S): 9 INJECTION INTRAMUSCULAR; INTRAVENOUS; SUBCUTANEOUS at 14:53

## 2024-05-01 RX ADMIN — SODIUM CHLORIDE 10 MILLILITER(S): 9 INJECTION INTRAMUSCULAR; INTRAVENOUS; SUBCUTANEOUS at 10:33

## 2024-05-01 NOTE — ED ADULT NURSE NOTE - NSFALLHARMRISKINTERV_ED_ALL_ED

## 2024-05-01 NOTE — DISCHARGE NOTE NURSING/CASE MANAGEMENT/SOCIAL WORK - NSCORESITESY/N_GEN_A_CORE_RD
No Price (Do Not Change): 0.00 Instructions: This plan will send the code FBSD to the PM system.  DO NOT or CHANGE the price. Detail Level: Simple Body Of Note (Please Add Your Own Text Here): Will set up

## 2024-05-01 NOTE — DISCHARGE NOTE NURSING/CASE MANAGEMENT/SOCIAL WORK - NSDCPEFALRISK_GEN_ALL_CORE
For information on Fall & Injury Prevention, visit: [email or web address] OR  [email or web address] OR  https://www.cdc.gov/steadi/patient.html

## 2024-05-01 NOTE — CONSULT NOTE ADULT - SUBJECTIVE AND OBJECTIVE BOX
89 yo F with PMH of HTN, A-fib (not on AC), MRS of 1, who presented with global aphasia and right-sided weakness.     LNW 8:50 AM, when  heard the fall in the bathroom. He found her on the floor with aphasia and right-sided weakness.     NIHSS of 10.     CTA with left inferior M2 LVO.  CTP and CTH w/o core.

## 2024-05-01 NOTE — ED PROVIDER NOTE - PROGRESS NOTE DETAILS
TNK given at 10:33. Cardene started to keep SBP <185. BP controlled on nicardipine transfusion. Accepted to [facility] for neuroIR intervention. BP controlled on nicardipine infusion @2.5. Accepted to [facility] for neuroIR intervention. BP controlled on nicardipine infusion @2.5mg/hr. Accepted to [facility] for neuroIR intervention.

## 2024-05-01 NOTE — ED PROVIDER NOTE - NIH STROKE SCALE: 9. BEST LANGUAGE, QM
(2) Severe aphasia; all communication is through fragmentary expression; great need for inference, questioning, and guessing by the listener. Range of information that can be exchanged is limited; listener carries burden of communication. Examiner cannot identify materials provided from patient response. (3) Mute, global aphasia; no usable speech or auditory comprehension

## 2024-05-01 NOTE — PATIENT PROFILE ADULT - FUNCTIONAL ASSESSMENT - BASIC MOBILITY 6.
4-calculated by average/Not able to assess (calculate score using [facility] averaging method)

## 2024-05-01 NOTE — CONSULT NOTE ADULT - TIME BILLING
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  Agree with IV tenecteplase and transfer for possible endovascular thrombectomy

## 2024-05-01 NOTE — ED ADULT NURSE NOTE - BREATHING
"[facility]  Sparkle Dinh  2004      Sparkle Dinh is a 17 y.o. 11 m.o. female who comes for follow up consultation for tachycardia.  The patient's primary care provider is TOYIN Tejeda.     Sparkle is seen today with her mother, who served as an independent historian(s).    The patient was last seen in the clinic by me on 2021.    At last evaluation, the patient had syncope, tachycardia, orthostatic dizziness and an abnormal electrocardiogram suggesting possible left atrial enlargement.    The patient comes today for an echocardiogram.    The patient has had no further episodes of syncope.    The patient reports she continues to get dizzy and lightheaded when she stands up quickly.  The patient notes that walking helps.  The patient initially stated she drinks a few bottles of water a day.  Later during the interview, the patient's mother indurated that all her daughter drinks is water.    The patient noted on her review of systems that she bruises easily.  When I advised that the patient would need to follow-up with her primary care provider in likely have blood work for this issue, the patient's mother stated she has already had "lots of blood work." Since I have not reviewed any of the patient's blood work today eight, have asked my nurse to reach out to her primary provider so that I can review her recent laboratory results.    Patient has a history of anxiety.    Patient's family history is largely unknown.    The patient's weight and length are at the 44th percentile and the 82nd percentile, respectively.      Most Recent Cardiac Testin2022.  Echocardiogram, [facility].  1. Normal segmental anatomy.  2. Normal biventricular size and qualitatively normal systolic function.  3. No obvious atrial septal defect, but atrial septum was not well seen from subcostal imaging plane.  4. No significant valvular stenosis or regurgitation.  5. No evidence of " aortic coarctation.  6. No pericardial effusion.  **Clinical correlation recommended**    ---IMPORTANT TEST RESULTS REVIEWED AT PREVIOUS ENCOUNTER ARE BELOW---        21. Holter monitor, [facility].   Normal sinus rhythm with frequent sinus tachycardia.  Isolated PVCs.  Heart rate  beats per minute.  Average heart rate was 96 beats per minute.  Twenty-three PVCs per hour which constitutes less than 1% of total heartbeats.  There were 46 pairs.  I reviewed the tracings.  There was much artifact throughout.       21.  Electrocardiogram, [facility]. Sinus rhythm. Heart rate = 88 bpm, normal MA interval, QRS duration, and QTc (459 ms).  Possible left atrial enlargement    21. Chest radiogram, [facility].   Cardiopulmonary disease.  No images available for my independent review.        Laboratory and Other Testin2022.  Normal glucose, BUN, creatinine, sodium, potassium, chloride    2022.  Normal white blood cell count, hemoglobin, hematocrit, MCV, platelets, glucose, BUN, creatinine, sodium, potassium, chloride, calcium, AST, total bili, ALT  Urinalysis cloudy color.  Negative glucose, bilirubin, ketones, protein, nitrite, blood, leukocyte, trace bacteria, mucus present          Current Medications:      Medication List          Accurate as of March 3, 2022  1:57 PM. If you have any questions, ask your nurse or doctor.            CONTINUE taking these medications    DULoxetine 30 MG capsule  Commonly known as: CYMBALTA            Allergies: Review of patient's allergies indicates:  No Known Allergies    Family History   Problem Relation Age of Onset    Childhood respiratory disease Sister         asthma    Deafness Brother         half-brother    Anemia Neg Hx     Arrhythmia Neg Hx     Cardiomyopathy Neg Hx     Clotting disorder Neg Hx     Congenital heart disease Neg Hx     Early death Neg Hx     Heart attacks under age 50 Neg Hx     Hypertension  "Neg Hx     Long QT syndrome Neg Hx     Pacemaker/defibrilator Neg Hx     Premature birth Neg Hx     Seizures Neg Hx     SIDS Neg Hx      Past Medical History:   Diagnosis Date    Anxiety     Palpitations     Syncope      Social History     Socioeconomic History    Marital status: Single   Social History Narrative    Lives at home with guardian-mother and father, their daughter, and grandson. She is currently in the 12th grade. She works at [employer] and when not working or at school, likes to hang out with friends.      Past Surgical History:   Procedure Laterality Date    TONSILLECTOMY AND ADENOIDECTOMY  2015       Past medical history, family history, surgical history, social history updated and reviewed today.     ROS   Category Symptom Negative Positive Notes   General Weight Loss [x] []     Fever [x] []     Fatigue [x] []    HEENT Headaches [] [x]     Runny Nose [x] []     Earaches [x] []    Heart Murmur [x] []     Chest Pain [x] []     Exercise Intolerance [x] []     Palpitations [] [x]     Excessive Sweating [x] []    Respiratory Wheezing [x] []     Cough [x] []     Shortness of Breath [x] []     Snoring [x] []    GI Nausea [x] []     Vomiting [x] []     Constipation [x] []     Diarrhea [x] []     Reflux [x] []     Poor Appetite [] [x] Picky eater    Blood in urine [x] []     Pain with urination [x] []    Musculoskeletal Joint Pain [x] []     Swollen Joints [x] []    Skin Rash [] [x] Holter monitor stickers caused a rash   Neurologic Fainting [] [x]     Weakness [x] []     Seizures [x] []     Dizziness [] [x]    Endocrine Excessive urination [x] []     Excessive thirst [x] []     Temp. intolerance [x] []    Heme Bruising/Bleeding [x] []    Psychologic Concentration [x] []            Objective:   Vitals:    03/03/22 0911   BP: 116/66   Pulse: 83   Resp: 20   SpO2: 98%   Weight: 54.8 kg (120 lb 11.2 oz)   Height: 5' 6.54" (1.69 m)         Physical Exam  GENERAL: Awake, Cooperative with exam, " well-developed well-nourished, no apparent distress  HEENT: mucous membranes moist and pink, normocephalic, no carotid bruits, sclera anicteric  NECK:  no lymphadenopathy  CHEST: Good air movement, clear to auscultation bilaterally  CARDIOVASCULAR: Quiet precordium, regular rate and rhythm, normal S1, normally split S2, No S3 or S4, No murmur.   ABDOMEN: Soft, non-tender, non-distended, no hepatosplenomegaly.  EXTREMITIES: Warm well perfused, 2+ radial/pedal/femoral pulses, capillary refill 2 seconds, no clubbing, cyanosis, or edema  NEURO:  Face symmetric, moves all extremities well.  Skin: pink, good turgor, no rash         Assessment:  1. PVC's (premature ventricular contractions)    2. Bruising    3. Tachycardia    4. Orthostatic dizziness    5. Syncope and collapse        Discussion:     I have reviewed our general guidelines related to cardiac issues with the family.  I instructed them in the event of an emergency to call 911 or go to the nearest emergency room.  They know to contact the PCP if problems arise or if they are in doubt.    I reviewed the patient's recent Holter monitor from [facility].  The patient has a approximately 23 PVCs per hour.  While this represents less than 1% of the patient's total heart beats, I would like the patient have a repeat Holter monitor in approximately three months.  This Holter monitor is to assess PVC burden.    The patient has continued tachycardia.  The patient is not anemic based on recent labs.  The patient's primary care provider is currently awaiting her to have TFTs drawn.  I think it is important to assess the patient's thyroid function given her continued symptoms.  I made the following recommendations at her last evaluation:  I advised the patient to keep a symptom journal.  She should be evaluated in the emergency room for episodes of palpitations lasting more than 20 minutes or if there is a loss of consciousness.  She was instructed to avoid  caffeine and chocolate. Sparkle should be observed during water activities, and a life vest should be used at all times. She patient should avoid dark water activities.   Increased hydration may help with the patient's tachycardia; however, it is worth noting the patient is supine heart rate was 97 beats per minute and the patient's heart rate after standing for 3 minutes was only 104 beats per minute.    The patient has a history of  vasovagal syncope.  I reviewed the following recommendations:    The patient was instructed to drink plenty of fluids. The patient may add some salt to her diet. The patient was instructed to squat like a catcher if she feels dizzy or lightheaded. If the patient continues to feel dizzy or lightheaded, she should lay down on the ground to prevent injury. Sparkle should be observed during water activities and a life vest should be used at all times. The patient should avoid dark water activities. Sparkle should get at least 10 hours of sleep a night. The patient should have 30 minutes of quiet time without electronics prior to bed. Sparkle was encouraged to not take electronics to bed with her. The patient should raise the head of the bed by 4 inches.    I did explain to the patient and her mother that we may not be able to completely cured the dizziness and lightheadedness.  I advised them that increased hydration and salt intake is the main stay of therapy.  I did advise them that our primary goal is to prevent additional episodes of syncope.  I am happy the patient has had no further episodes of syncope since her last evaluation.  I understand that it is frustrating that the patient has continued to have episodes of dizziness and lightheadedness.  I did advise the family that there are medications we could consider if the patient's symptoms worsen, but the medications mechanism of action is to increase water and salt.  I also advised the family that they could be seen in our  dysautonomia clinic with Ms. Vasquez if they desire.    The patient has a structurally normal heart by echocardiogram.    Follow up in about 3 months (around 6/3/2022) for Clinic appt., Holter.    To Do List/Things We Worry About:     *  Keep a symptom diary.  If the patient has symptoms of palpitations or loses consciousness, please contact this office as additional testing may be indicated.    * Patient may add salt to her diet.    *  Patient should drink water daily.  The patient should drink enough water so urine is clear. Goal is 60-80 ounces/day.    *  Squat like a catcher if you feel dizzy and light headed.  If no improvement, lay on the ground and prop your feet up.    * Patient should be observed during water activities and a life vest should be used at all times. Patient should avoid dark water activities.    * Patient should get at least 8-10 hours of sleep a night.    * Patient should have 30 minutes of quiet time without electronics prior to bed. Patient should not take electronics to bed with them.    ** Seek medical care in an ER setting for palpitations lasting more than 20 minutes.    ** The patient should avoid caffeine, chocolate, and other stimulants.    **Continue to follow up with primary provider for bruising.        Plan:  1. Activity: No special precautions, may participate in age-appropriate activities    2. SBE Prophylaxis Recommendation:     · The patient should see a dentist every 6 months for routine dental care.     · No spontaneous bacterial endocarditis prophylaxis is required.    3. Anesthesia Risk Stratification:    · If anesthesia is needed for surgery, no special precautions from a cardiovascular standpoint are necessary.     · All anesthesia should be performed by providers with the required training, expertise, and ability to respond to any unforeseen emergency that may arise in a pediatric patient.    4. Medications:   Current Outpatient Medications   Medication Sig     DULoxetine (CYMBALTA) 30 MG capsule Take 30 mg by mouth every morning.     No current facility-administered medications for this visit.        5. Orders placed this encounter  Orders Placed This Encounter   Procedures    Holter Monitor - [facility] Pediatric[facility]       Follow-Up:     Follow up in about 3 months (around 6/3/2022) for Clinic appt., Holter.    This documentation was created using Dragon Natural Speaking voice recognition software. Content is subject to voice recognition errors.    The total clinic encounter on 3/3/22 took more than 40 minutes (E5). This includes face-to-face time, time spent preparing to see the patient (eg, review of tests), obtaining and/or reviewing separately obtained history, documenting clinical information in the electronic or other health record, independently interpreting results, communicating results to the patient/family/caregiver, and care coordination.        Sincerely,    Jacob Serna MD, DNBPAS, FAAP, FACC, SHANNANE  Senior Physician?[facility], Pediatric Cardiology, Pediatric Subspecialty [facility], [place], [place]  Clinical  of Medicine ?[employer], Department of Medicine, [place], [place]  Board Certified in Pediatric Cardiology and General Pediatrics ?American Board of Pediatrics                 spontaneous

## 2024-05-01 NOTE — DISCHARGE NOTE NURSING/CASE MANAGEMENT/SOCIAL WORK - PATIENT PORTAL LINK FT
You can access the FollowMyHealth Patient Portal offered by [facility] by registering at the following website: [email or web address]. By joining "[employer]’s FollowMyHealth portal, you will also be able to view your health information using other applications (apps) compatible with our system.

## 2024-05-01 NOTE — H&P ADULT - ASSESSMENT
89F with PMH HF, afib, HTN, DM who presented with R sided weakness and aphasia, found to have LM2 occlusion. TNK given at 10:33. Underwent diagnostic angio which showed distal LM4 occlusion, no thrombectomy performed. Admitted to NSICU for post TNK care.      Plan  Neuro:  - Q1 hour Neuro checks, Q1 hour Vitals  - HOB 30 degrees, Neck midline position  - Maintain normothermia, PO acetaminophen for temp>38 C or pain  - CTH at 24 hours post TNK  - MRI for stroke w/u   - Monitor groin  - Turn and Position Q2  /  Activity ad willy, with assistance  	  CV:  - SBP Goal 120-180  - TTE  - Lipid profile     Pulm:  - Supplemental O2 PRN to maintain Spo2>92%  - Chest PT, OOB, Pulmonary Toilet    GI:  - Nutrition: NPO 2/2 TNK  	  Gu:  - Mott   - Fluids: NS @ 50  - I&O Q1 hour  - Monitor Electrolytes & Renal Function    Heme:  - Monitor H&H  - Hold ASA 2/2 TNK   - Chemical DVT prophylaxis:  * Chemical DVT prophylaxis is contraindicated due to risk of bleeding  - Mechanical DVT Prophylaxis: Maintain B/L LE sequential compression devices  	  ID:  - Monitor WBC and Temperature  		  Endo  - Monitor BGL, maintain <180  - MISS  - A1C in am   - TSH in am

## 2024-05-01 NOTE — H&P ADULT - NSHPPHYSICALEXAM_GEN_ALL_CORE
Neuro  * Mental Status:  Awake, alert  * Cranial Nerves: R Facial. PERRL,  tongue midline, left gaze deviation  * Motor: RUE 3/5, LUE 5/5, RLE 2/5, LLE 5/5  * Sensory: Sensation diminished to light touch on R  * Reflexes: Not assessed  * Gait: Not assessed    Cardiovascular:  S1, S2 no murmurs appreciated.  Regular rate and rhythm.  Eyes: See neurologic examination with detailed examination of eyes.  ENT: No JVD, Trachea Midline.  Respiratory: Clear to auscultation.  Gastrointestinal: Soft, nontender, nondistended.  Genitourinary: [ ] Mott, [ x ] No Mott.   Musculoskeletal: No muscle wasting noted,  No pretibial edema appreciated, no appreciable calf tenderness.  Skin:  Wound inspected, no redness, bleeding or drainage noted.    Hematologic / Lymph / Immunologic: No bleeding from IV sites or wounds, No lymphadenopathy, No Hives or allergic type skin lesions
no

## 2024-05-01 NOTE — H&P ADULT - CRITICAL CARE ATTENDING COMMENT
89F with acute CVA due to LM2 occlusion, s/p IV thrombolysis; angio revealed distal M4. Likely cardio   PMH afib (not on AC), HF, HTN.    Plan:  neurochecks, post thrombolysis and angio protocol  hold anti-thrombotics for 24 hrs  stability CTH  stroke core measures  maintain -180 and DBP<105; hold BP meds, monitor HR  maintain Osats>92%  NPO for now; cautious IV hydration till am  monitor e-lytes. UOP  maintain -180, ISS  SCDs      Pt is critically ill and at high risk of rapid deterioration/death due to abovementioned conditions.   Still requires critical care interventions - ongoing frequent evaluations, interventions and management adjustment by the Attending and ICU team, - and included review of relevant history, clinical examination, review of data and images, discussion of treatment with the multidisciplinary team and any consultants involved in this patient’s care as well as family discussion.

## 2024-05-01 NOTE — ED PROVIDER NOTE - WR ORDER STATUS 1
08/02/22 1039   Encounter Summary   Encounter Overview/Reason  Attempted Encounter  (Patient sleeping.) Performed Resulted

## 2024-05-01 NOTE — ED ADULT TRIAGE NOTE - CHIEF COMPLAINT QUOTE
Pt BIBA from home for possible stroke. Pt was in the bathroom and  heard a loud noise, found pt on floor at 0850. Pt ambulated into bathroom independently but could not walk or follow commands after falling off toilet. Right side facial droop, expressive aphasia and unable to walk/ follow commands. Pts baseline A&Ox3, walks independently and communicates appropriately. .

## 2024-05-01 NOTE — H&P ADULT - NSHPLABSRESULTS_GEN_ALL_CORE
13.9   5.86  )-----------( 156      ( 01 May 2024 10:15 )             42.4     05-01    141  |  108  |  31<H>  ----------------------------<  128<H>  4.2   |  26  |  1.20    Ca    9.6      01 May 2024 10:15  Phos  2.9     05-01  Mg     2.2     05-01    TPro  8.1  /  Alb  4.0  /  TBili  0.6  /  DBili  x   /  AST  31  /  ALT  31  /  AlkPhos  99  05-01      < from: CT Angio Brain Stroke Protocol  w/ IV Cont (05.01.24 @ 10:10) >  IMPRESSION:  CT angiogram neck:  -No hemodynamically significant stenosis.    CT angiogram head:  -Occlusion of a left MCA proximal M2 branch.    CT perfusion:  -49 mL penumbra identified in the left MCA territory.  -No completed core infarct identified by perfusion analysis, although   this is likely underestimated as suspected infarct is seen on noncontrast   CT in the left anterior insula.    Attempts to reach the ER by phone and teams began at 10:08 AM. Findings   discussed with Dr. Sheldon at 10:16 AM 5/1/2024.    _____________  NASCET criteria for internal carotid artery stenosis:  Mild: 0% to 49%  Moderate: 50% to 69%  Severe: 70% to 99%  Complete Occlusion    --- End of Report ---  MIC HADLEY MD; Attending Radiologist  This document has been electronically signed. May  1 2024 10:24AM    < end of copied text >

## 2024-05-01 NOTE — PHARMACOTHERAPY INTERVENTION NOTE - COMMENTS
Patient presenting with symptoms of stroke and is a candidate to receive tenecteplase. Worked with ED team to dose and prepare tenecteplase (0.25 mg/kg). Bolus of  16 mg = 3.2 mL (5mg/mL) pushed over 5 seconds by ED physician at 10:33.

## 2024-05-01 NOTE — ED PROVIDER NOTE - OBJECTIVE STATEMENT
85 y/o F PMH of Afib (no a/c), HTN, presenting with facial droop and aphasia    LKW 9:45 yesterday evening. Spouse did not see her this morning but heard her moaning in bathroom on the floor around 850AM. EMS noted R facial droop and patient aphasic. CODE stroke called on arrival. Patient does not take anticoagulation. 85 y/o F PMH of Afib (no a/c), HTN, diastolic CHF presenting with facial droop and aphasia    LKW 9:45 yesterday evening. Spouse did not see her this morning but heard her moaning in bathroom on the floor around 850AM. EMS noted R facial droop and patient aphasic. CODE stroke called on arrival. Patient does not take anticoagulation, only ASA 85 y/o F PMH of Afib (no a/c), HTN, diastolic CHF presenting with facial droop and aphasia    LKW 9:45 yesterday evening. Spouse did not see her this morning but heard her moaning in bathroom on the floor around 850AM. EMS noted R facial droop and patient aphasic. CODE stroke called on arrival. Patient does not take anticoagulation, only ASA. At baseline, patient is AxO x 3

## 2024-05-01 NOTE — ED ADULT NURSE NOTE - OBJECTIVE STATEMENT
Pt brought in by ambulance with the c/o right sided weakness, slurred speech and right sided facial droop. As per pt's , pt woke up as normal and walked to the bathroom with a cane and around 0850 am he heard a thud and then he heard moaning and groaning and he found pt on the floor. She fell off toilet. As per MD's orders pt made a stroke protocol. FS done and IV talya placed blood specimen obtained and sent to the lab. Pt given TNK and tolerated well. VS taken and charted. Pt transferred to [place] for clot retrieval

## 2024-05-01 NOTE — ED PROVIDER NOTE - PHYSICAL EXAMINATION
see NIHSS  +aphasia  +RLE drift    GENERAL: alert, easily arousable   HEAD:  Atraumatic, Normocephalic  EYES: EOMI, PERRLA,   ENT: MMM; oropharynx clear  NECK: Supple, No JVD  CHEST/LUNG: Clear to auscultation bilaterally; No wheeze  HEART: Regular rate and rhythm; No murmurs, rubs, or gallops  ABDOMEN: Soft, Nontender, Nondistended; Bowel sounds present  EXTREMITIES:  2+ Peripheral Pulses, No clubbing, cyanosis, or edema  NEUROLOGY: see NIHSS  SKIN: No rashes or lesions

## 2024-05-01 NOTE — DISCHARGE NOTE NURSING/CASE MANAGEMENT/SOCIAL WORK - NSDCFUADDAPPT_GEN_ALL_CORE_FT
Discharging To:            [facility]             [address] [place] [address]            Phone: [phone]
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
## 2024-05-01 NOTE — H&P ADULT - HISTORY OF PRESENT ILLNESS
89F with PMH afib, HF, HTN who presents with stroke. Patient's last known well 8:30 am, found down by  this morning with R sided weakness and unable to speak.  She was taken to [facility], code stroke called. CTH negative, CTA showed LM2 occlusion. Patient received TNK at 10:33 am. She was then transferred to [facility] for mechanical thrombectomy.  89F with PMH afib, HF, HTN who presents with stroke. Patient's last known well 8:30 am, found down by  this morning with R sided weakness and unable to speak.  She was taken to [facility] system, code stroke called. CTH negative, CTA showed LM2 occlusion. Patient received TNK at 10:33 am. She was then transferred to [facility] for mechanical thrombectomy. During angiogram, she was found to have a distal LM4 occlusion, so no thrombectomy was performed. She was extubated and admitted to NSICU for post TNK care.     NIH 16 on arrival, mRS 1  DATE: 5/1/24  TIME: 12:00  1A: Level of consciousness (0-3): 0  1B: Questions (0-2): 2    1C: Commands (0-2): 2  2: Gaze (0-2): 1  3: Visual fields (0-3): 0  4: Facial palsy (0-3): 2  MOTOR:  5A: Left arm motor drift (0-4): 0  5B: Right arm motor drift (0-4): 1  6A: Left leg motor drift (0-4): 0  6B: Right leg motor drift (0-4): 2  7: Limb ataxia (0-2): 0  SENSORY:  8: Sensation (0-2): 1  SPEECH:  9: Language (0-3): 2  10: Dysarthria (0-2): 2  EXTINCTION:  11: Extinction/inattention (0-2): 1    TOTAL SCORE: 16

## 2024-05-01 NOTE — CONSULT NOTE ADULT - ASSESSMENT
Presented within tenecteplase window and it was administered.  EVT discussed with the  - he agrees to proceed.     Transferred to [facility] for the EVT      Magen Jaimes MD  Stroke Fellow     Presented within tenecteplase window and it was administered.  EVT discussed with the  - he agrees to proceed.     Transferred to [facility] for the EVT      Magen Jaimes MD  Stroke Fellow      Stroke attending. Agree with above

## 2024-05-01 NOTE — PATIENT PROFILE ADULT - FALL HARM RISK - HARM RISK INTERVENTIONS

## 2024-05-01 NOTE — CHART NOTE - NSCHARTNOTEFT_GEN_A_CORE
Neurointerventional Surgery  Pre-Procedure Note     HPI:    This is a 89y right hand dominant Female who presents to the facility on the afternoon of 5/1/24 for emergent mechanical thrombectomy. Patient arrives as a transfer from [facility] after suffering acute onset expressive aphasia and right hemiparesis with a last known normal of 10:30 this morning. Patient harbors a past medical history significant for atrial fibrillation (not on AC), HTN, and diastolic heart failure. At [place] it was discovered patient possessed an occlusion of the superior LM2 artery with a favorable perfusion scan prompting TNK administration as patient had an NIHSS of 10. Risks and benefits of subsequent emergent mechanical thrombectomy were discussed with patients  and decision was made to bring patient to [facility] for procedure with Dr. Christensen Pre-MRS of 1      Allergies: penicillins (Swelling)  latex (Rash)      PMH/PSH:  PAST MEDICAL & SURGICAL HISTORY:  H/O pleural effusion      HTN (hypertension)      Hyperlipidemia      Prediabetes      Basal cell carcinoma  and squamous cell carcinoma removed      Atrial fibrillation      History of total right hip replacement  left 2015          Social History:   Social History:    FAMILY HISTORY:  No pertinent family history in first degree relatives        Current Medications:       Physical Exam:  Constitutional: NAD    Neuro  * Mental Status:  GCS 15:  E(4), V(5), M(6).  Awake, alert,   * Cranial Nerves: R Facial. PERRL, EOMI, tongue midline, left gaze deviation  * Motor: RUE 3/5, LUE 5/5, RLE 2/5, LLE 5/5  * Sensory: Sensation diminished to light touch  * Reflexes: Not assessed  * Gait: Not assessed    Cardiovascular:  S1, S2 no murmurs appreciated.  Regular rate and rhythm.  Eyes: See neurologic examination with detailed examination of eyes.  ENT: No JVD, Trachea Midline.  Respiratory: Clear to auscultation.  Gastrointestinal: Soft, nontender, nondistended.  Genitourinary: [ ] Mott, [ x ] No Mott.   Musculoskeletal: No muscle wasting noted,  No pretibial edema appreciated, no appreciable calf tenderness.  Skin:  Wound inspected, no redness, bleeding or drainage noted.    Hematologic / Lymph / Immunologic: No bleeding from IV sites or wounds, No lymphadenopathy, No Hives or allergic type skin lesions      NIH SS: 16  DATE: 5/1/24  TIME: 12:00  1A: Level of consciousness (0-3): 0  1B: Questions (0-2): 2    1C: Commands (0-2): 2  2: Gaze (0-2): 1  3: Visual fields (0-3): 0  4: Facial palsy (0-3): 2  MOTOR:  5A: Left arm motor drift (0-4): 0  5B: Right arm motor drift (0-4): 1  6A: Left leg motor drift (0-4): 0  6B: Right leg motor drift (0-4): 2  7: Limb ataxia (0-2): 0  SENSORY:  8: Sensation (0-2): 1  SPEECH:  9: Language (0-3): 2  10: Dysarthria (0-2): 2  EXTINCTION:  11: Extinction/inattention (0-2): 1    TOTAL SCORE: 16            Assessment/Plan:   This is a 89y  year old  hand dominant Female  who presents to neuro-IR for selective cerebral angiography.     Procedure, goals, risks, benefits and alternatives  were discussed with patients .  All questions were answered.  Risks include but are not limited to stroke, vessel injury, hemorrhage, and or groin hematoma.  Patients  demonstrates understanding  of all risks involved with this procedure and wishes to continue.   Appropriate consent was obtained from patient  and consent is in the patient's chart. Neurointerventional Surgery  Pre-Procedure Note     HPI:    This is a 89y right hand dominant Female who presents to the facility on the afternoon of 5/1/24 for emergent mechanical thrombectomy. Patient arrives as a transfer from [facility] after suffering acute onset expressive aphasia and right hemiparesis with a last known normal of 8:45 this morning. Patient harbors a past medical history significant for atrial fibrillation (not on AC), HTN, and diastolic heart failure. At [place] it was discovered patient possessed an occlusion of the superior LM2 artery with a favorable perfusion scan prompting TNK administration  at 10:33 as patient had an NIHSS of 10. Risks and benefits of subsequent emergent mechanical thrombectomy were discussed with patients  and decision was made to bring patient to [facility] for procedure with Dr. Christensen. Pre-MRS of 1      Allergies: penicillins (Swelling)  latex (Rash)      PMH/PSH:  PAST MEDICAL & SURGICAL HISTORY:  H/O pleural effusion      HTN (hypertension)      Hyperlipidemia      Prediabetes      Basal cell carcinoma  and squamous cell carcinoma removed      Atrial fibrillation      History of total right hip replacement  left 2015      FAMILY HISTORY:  No pertinent family history in first degree relatives      Physical Exam:    Neuro  * Mental Status:  Awake, alert  * Cranial Nerves: R Facial. PERRL,  tongue midline, left gaze deviation  * Motor: RUE 3/5, LUE 5/5, RLE 2/5, LLE 5/5  * Sensory: Sensation diminished to light touch on R  * Reflexes: Not assessed  * Gait: Not assessed    Cardiovascular:  S1, S2 no murmurs appreciated.  Regular rate and rhythm.  Eyes: See neurologic examination with detailed examination of eyes.  ENT: No JVD, Trachea Midline.  Respiratory: Clear to auscultation.  Gastrointestinal: Soft, nontender, nondistended.  Genitourinary: [ ] Mott, [ x ] No Mott.   Musculoskeletal: No muscle wasting noted,  No pretibial edema appreciated, no appreciable calf tenderness.  Skin:  Wound inspected, no redness, bleeding or drainage noted.    Hematologic / Lymph / Immunologic: No bleeding from IV sites or wounds, No lymphadenopathy, No Hives or allergic type skin lesions      [facility] SS: 16  DATE: 5/1/24  TIME: 12:00  1A: Level of consciousness (0-3): 0  1B: Questions (0-2): 2    1C: Commands (0-2): 2  2: Gaze (0-2): 1  3: Visual fields (0-3): 0  4: Facial palsy (0-3): 2  MOTOR:  5A: Left arm motor drift (0-4): 0  5B: Right arm motor drift (0-4): 1  6A: Left leg motor drift (0-4): 0  6B: Right leg motor drift (0-4): 2  7: Limb ataxia (0-2): 0  SENSORY:  8: Sensation (0-2): 1  SPEECH:  9: Language (0-3): 2  10: Dysarthria (0-2): 2  EXTINCTION:  11: Extinction/inattention (0-2): 1    TOTAL SCORE: 16            Assessment/Plan:   This is a 89y  year old right hand dominant Female  who presents to neuro-IR for selective cerebral angiography.     Procedure, goals, risks, benefits and alternatives  were discussed with patients .  All questions were answered. Risks include but are not limited to stroke, vessel injury, hemorrhage, and or groin hematoma.  Patients  demonstrates understanding  of all risks involved with this procedure and wishes to continue.   Appropriate consent was obtained from patient  and consent is in the patient's chart.

## 2024-05-01 NOTE — ED PROVIDER NOTE - NIH STROKE SCALE: 4. FACIAL PALSY, QM
(0) Normal symmetrical movements (3) Complete paralysis of one or both sides (absence of facial movement in the upper and lower face) (2) Partial paralysis (total or near-total paralysis of lower face)

## 2024-05-01 NOTE — CHART NOTE - NSCHARTNOTEFT_GEN_A_CORE
Neurointerventional Surgery Post Procedure Note    Procedure: Selective Cerebral Angiography     Pre- Procedure Diagnosis: R M2 occlusion  Post- Procedure Diagnosis: distal R4 occlusion, no thrombectomy performed    : Dr. Christensen  Fellow: Ramon Taylor   Physician Assistant: Selin Barreot PA-C  Nurse: Rachel Caro   Anesthesiologist: Dr. Donnelly            Radiology Tech: Carmina Hay     Sheath:  6 [place] Sheath    I/Os: estimated blood loss less than 30cc,  IV fluids 300cc, Urine output cc, Contrast: Omnipaque 240  60 cc    Vitals:  /58   HR 72  Spo2 100 %    Preliminary Report:  Under a 6 [place] BMX 96 sheath via the right groin under general anesthesia via left internal carotid artery, a selective cerebral angiography  was performed and reveals distal R4 occlusion, no thrombectomy performed. ( Official note to follow).    Patient tolerated procedure well.  Patient remains hemodynamically stable, no change in neurological status compared to baseline.  Results were discussed with patient, patient's family.  Right groin sheath  was discontinued at 1304. Hemostasis was obtained with approximately 21 minutes of manual compression.     No active bleeding, no hematoma, no ecchymosis.   Quick clot and safeguard balloon dressing applied at 1325  Patient transferred to neuro ICU in stable condition.

## 2024-05-02 LAB
A1C WITH ESTIMATED AVERAGE GLUCOSE RESULT: 5.5 % — SIGNIFICANT CHANGE UP (ref 4–5.6)
ANION GAP SERPL CALC-SCNC: 13 MMOL/L — SIGNIFICANT CHANGE UP (ref 5–17)
BUN SERPL-MCNC: 21.1 MG/DL — HIGH (ref 8–20)
CALCIUM SERPL-MCNC: 8.4 MG/DL — SIGNIFICANT CHANGE UP (ref 8.4–10.5)
CHLORIDE SERPL-SCNC: 104 MMOL/L — SIGNIFICANT CHANGE UP (ref 96–108)
CHOLEST SERPL-MCNC: 154 MG/DL — SIGNIFICANT CHANGE UP
CO2 SERPL-SCNC: 23 MMOL/L — SIGNIFICANT CHANGE UP (ref 22–29)
CREAT SERPL-MCNC: 0.87 MG/DL — SIGNIFICANT CHANGE UP (ref 0.5–1.3)
EGFR: 63 ML/MIN/1.73M2 — SIGNIFICANT CHANGE UP
ESTIMATED AVERAGE GLUCOSE: 111 MG/DL — SIGNIFICANT CHANGE UP (ref 68–114)
FERRITIN SERPL-MCNC: 51 NG/ML — SIGNIFICANT CHANGE UP (ref 13–330)
FOLATE SERPL-MCNC: >20 NG/ML — SIGNIFICANT CHANGE UP
GLUCOSE BLDC GLUCOMTR-MCNC: 102 MG/DL — HIGH (ref 70–99)
GLUCOSE BLDC GLUCOMTR-MCNC: 117 MG/DL — HIGH (ref 70–99)
GLUCOSE BLDC GLUCOMTR-MCNC: 118 MG/DL — HIGH (ref 70–99)
GLUCOSE BLDC GLUCOMTR-MCNC: 123 MG/DL — HIGH (ref 70–99)
GLUCOSE SERPL-MCNC: 99 MG/DL — SIGNIFICANT CHANGE UP (ref 70–99)
HCT VFR BLD CALC: 33.6 % — LOW (ref 34.5–45)
HCT VFR BLD CALC: 35 % — SIGNIFICANT CHANGE UP (ref 34.5–45)
HDLC SERPL-MCNC: 40 MG/DL — LOW
HGB BLD-MCNC: 11.2 G/DL — LOW (ref 11.5–15.5)
HGB BLD-MCNC: 11.4 G/DL — LOW (ref 11.5–15.5)
IRON SATN MFR SERPL: 15 % — SIGNIFICANT CHANGE UP (ref 14–50)
IRON SATN MFR SERPL: 51 UG/DL — SIGNIFICANT CHANGE UP (ref 37–145)
LIPID PNL WITH DIRECT LDL SERPL: 96 MG/DL — SIGNIFICANT CHANGE UP
MAGNESIUM SERPL-MCNC: 2 MG/DL — SIGNIFICANT CHANGE UP (ref 1.6–2.6)
MCHC RBC-ENTMCNC: 29.4 PG — SIGNIFICANT CHANGE UP (ref 27–34)
MCHC RBC-ENTMCNC: 29.9 PG — SIGNIFICANT CHANGE UP (ref 27–34)
MCHC RBC-ENTMCNC: 32.6 GM/DL — SIGNIFICANT CHANGE UP (ref 32–36)
MCHC RBC-ENTMCNC: 33.3 GM/DL — SIGNIFICANT CHANGE UP (ref 32–36)
MCV RBC AUTO: 89.6 FL — SIGNIFICANT CHANGE UP (ref 80–100)
MCV RBC AUTO: 90.2 FL — SIGNIFICANT CHANGE UP (ref 80–100)
NON HDL CHOLESTEROL: 114 MG/DL — SIGNIFICANT CHANGE UP
PHOSPHATE SERPL-MCNC: 3.8 MG/DL — SIGNIFICANT CHANGE UP (ref 2.4–4.7)
PLATELET # BLD AUTO: 141 K/UL — LOW (ref 150–400)
PLATELET # BLD AUTO: 152 K/UL — SIGNIFICANT CHANGE UP (ref 150–400)
POTASSIUM SERPL-MCNC: 3.7 MMOL/L — SIGNIFICANT CHANGE UP (ref 3.5–5.3)
POTASSIUM SERPL-SCNC: 3.7 MMOL/L — SIGNIFICANT CHANGE UP (ref 3.5–5.3)
RBC # BLD: 3.75 M/UL — LOW (ref 3.8–5.2)
RBC # BLD: 3.88 M/UL — SIGNIFICANT CHANGE UP (ref 3.8–5.2)
RBC # FLD: 14.6 % — HIGH (ref 10.3–14.5)
RBC # FLD: 14.7 % — HIGH (ref 10.3–14.5)
SODIUM SERPL-SCNC: 140 MMOL/L — SIGNIFICANT CHANGE UP (ref 135–145)
TIBC SERPL-MCNC: 337 UG/DL — SIGNIFICANT CHANGE UP (ref 220–430)
TRANSFERRIN SERPL-MCNC: 236 MG/DL — SIGNIFICANT CHANGE UP (ref 192–382)
TRIGL SERPL-MCNC: 90 MG/DL — SIGNIFICANT CHANGE UP
TSH SERPL-MCNC: 2.04 UIU/ML — SIGNIFICANT CHANGE UP (ref 0.27–4.2)
VIT B12 SERPL-MCNC: 608 PG/ML — SIGNIFICANT CHANGE UP (ref 232–1245)
WBC # BLD: 5.79 K/UL — SIGNIFICANT CHANGE UP (ref 3.8–10.5)
WBC # BLD: 6.19 K/UL — SIGNIFICANT CHANGE UP (ref 3.8–10.5)
WBC # FLD AUTO: 5.79 K/UL — SIGNIFICANT CHANGE UP (ref 3.8–10.5)
WBC # FLD AUTO: 6.19 K/UL — SIGNIFICANT CHANGE UP (ref 3.8–10.5)

## 2024-05-02 PROCEDURE — 99223 1ST HOSP IP/OBS HIGH 75: CPT

## 2024-05-02 PROCEDURE — 99233 SBSQ HOSP IP/OBS HIGH 50: CPT

## 2024-05-02 PROCEDURE — 99232 SBSQ HOSP IP/OBS MODERATE 35: CPT

## 2024-05-02 PROCEDURE — 70450 CT HEAD/BRAIN W/O DYE: CPT | Mod: 26

## 2024-05-02 RX ORDER — LABETALOL HCL 100 MG
10 TABLET ORAL ONCE
Refills: 0 | Status: COMPLETED | OUTPATIENT
Start: 2024-05-02 | End: 2024-05-02

## 2024-05-02 RX ORDER — ASPIRIN/CALCIUM CARB/MAGNESIUM 324 MG
81 TABLET ORAL DAILY
Refills: 0 | Status: DISCONTINUED | OUTPATIENT
Start: 2024-05-02 | End: 2024-05-07

## 2024-05-02 RX ORDER — ACETAMINOPHEN 500 MG
650 TABLET ORAL EVERY 6 HOURS
Refills: 0 | Status: DISCONTINUED | OUTPATIENT
Start: 2024-05-02 | End: 2024-05-08

## 2024-05-02 RX ORDER — ENOXAPARIN SODIUM 100 MG/ML
40 INJECTION SUBCUTANEOUS EVERY 24 HOURS
Refills: 0 | Status: DISCONTINUED | OUTPATIENT
Start: 2024-05-02 | End: 2024-05-07

## 2024-05-02 RX ORDER — POTASSIUM CHLORIDE 20 MEQ
40 PACKET (EA) ORAL ONCE
Refills: 0 | Status: COMPLETED | OUTPATIENT
Start: 2024-05-02 | End: 2024-05-02

## 2024-05-02 RX ORDER — ATORVASTATIN CALCIUM 80 MG/1
40 TABLET, FILM COATED ORAL AT BEDTIME
Refills: 0 | Status: DISCONTINUED | OUTPATIENT
Start: 2024-05-02 | End: 2024-05-08

## 2024-05-02 RX ORDER — ATORVASTATIN CALCIUM 80 MG/1
20 TABLET, FILM COATED ORAL AT BEDTIME
Refills: 0 | Status: DISCONTINUED | OUTPATIENT
Start: 2024-05-02 | End: 2024-05-02

## 2024-05-02 RX ORDER — HYDRALAZINE HCL 50 MG
10 TABLET ORAL ONCE
Refills: 0 | Status: COMPLETED | OUTPATIENT
Start: 2024-05-02 | End: 2024-05-02

## 2024-05-02 RX ORDER — METOPROLOL TARTRATE 50 MG
50 TABLET ORAL DAILY
Refills: 0 | Status: DISCONTINUED | OUTPATIENT
Start: 2024-05-02 | End: 2024-05-08

## 2024-05-02 RX ORDER — SENNA PLUS 8.6 MG/1
2 TABLET ORAL AT BEDTIME
Refills: 0 | Status: DISCONTINUED | OUTPATIENT
Start: 2024-05-02 | End: 2024-05-08

## 2024-05-02 RX ORDER — FUROSEMIDE 40 MG
40 TABLET ORAL DAILY
Refills: 0 | Status: DISCONTINUED | OUTPATIENT
Start: 2024-05-02 | End: 2024-05-08

## 2024-05-02 RX ORDER — CHLORHEXIDINE GLUCONATE 213 G/1000ML
1 SOLUTION TOPICAL
Refills: 0 | Status: DISCONTINUED | OUTPATIENT
Start: 2024-05-02 | End: 2024-05-08

## 2024-05-02 RX ORDER — POLYETHYLENE GLYCOL 3350 17 G/17G
17 POWDER, FOR SOLUTION ORAL DAILY
Refills: 0 | Status: DISCONTINUED | OUTPATIENT
Start: 2024-05-02 | End: 2024-05-08

## 2024-05-02 RX ADMIN — ENOXAPARIN SODIUM 40 MILLIGRAM(S): 100 INJECTION SUBCUTANEOUS at 11:33

## 2024-05-02 RX ADMIN — Medication 81 MILLIGRAM(S): at 11:33

## 2024-05-02 RX ADMIN — Medication 40 MILLIEQUIVALENT(S): at 06:48

## 2024-05-02 RX ADMIN — SENNA PLUS 2 TABLET(S): 8.6 TABLET ORAL at 21:26

## 2024-05-02 RX ADMIN — Medication 10 MILLIGRAM(S): at 22:29

## 2024-05-02 RX ADMIN — Medication 10 MILLIGRAM(S): at 20:29

## 2024-05-02 RX ADMIN — ATORVASTATIN CALCIUM 40 MILLIGRAM(S): 80 TABLET, FILM COATED ORAL at 21:26

## 2024-05-02 RX ADMIN — POLYETHYLENE GLYCOL 3350 17 GRAM(S): 17 POWDER, FOR SOLUTION ORAL at 11:33

## 2024-05-02 RX ADMIN — CHLORHEXIDINE GLUCONATE 1 APPLICATION(S): 213 SOLUTION TOPICAL at 06:47

## 2024-05-02 NOTE — CONSULT NOTE ADULT - SUBJECTIVE AND OBJECTIVE BOX
[facility] Stroke Attending Note  CC: Stroke  HPI: 89 y/o F with hx of Atrial Fibrillation, not on AC (patient choice), HTN, CHF, Prediabetes, who was transferred from [facility] for LM2 occlusion.  Patient presented yesterday to [facility] ED, LKW at 8:30AM, walked to bathroom, then  heard loud noise and found her with R sided weakness and aphasia.  CTH negative, CTA showed LM2 occlusion. Patient received TNK at 10:33 am and then transferred to [facility] for mechanical thrombectomy. During angiogram, she was found to have a distal LM4 occlusion, so no thrombectomy was performed.  Repeat CT head this AM, stable.      Per family: patient was not on AC by choice--they state she is sensitive to medications so she never wanted to start AC.      Stroke risk factors:    PAST MEDICAL & SURGICAL HISTORY:  H/O pleural effusion  HTN (hypertension)  Hyperlipidemia  Prediabetes  Basal cell carcinoma  and squamous cell carcinoma removed  Atrial fibrillation  History of total right hip replacement  left 2015        MEDICATIONS  (STANDING):  aspirin  chewable 81 milliGRAM(s) Oral daily  atorvastatin 40 milliGRAM(s) Oral at bedtime  chlorhexidine 2% Cloths 1 Application(s) Topical <User Schedule>  dextrose 50% Injectable 25 Gram(s) IV Push once  dextrose 50% Injectable 12.5 Gram(s) IV Push once  enoxaparin Injectable 40 milliGRAM(s) SubCutaneous every 24 hours  furosemide    Tablet 40 milliGRAM(s) Oral daily  insulin lispro (ADMELOG) corrective regimen sliding scale   SubCutaneous every 6 hours  metoprolol succinate ER 50 milliGRAM(s) Oral daily  polyethylene glycol 3350 17 Gram(s) Oral daily  senna 2 Tablet(s) Oral at bedtime    MEDICATIONS  (PRN):  acetaminophen     Tablet .. 650 milliGRAM(s) Oral every 6 hours PRN Temp greater or equal to 38C (100.4F), Mild Pain (1 - 3)      Allergies    penicillins (Swelling)  penicillin (Unknown)  latex (Rash)    Intolerances        SOCIAL HISTORY:  no tob,   no alcohol   no drugs    FAMILY HISTORY:        ROS: 14 point ROS negative other than what is present in HPI or below    Vital Signs Last 24 Hrs  T(C): 36.9 (02 May 2024 11:52), Max: 36.9 (02 May 2024 04:40)  T(F): 98.4 (02 May 2024 11:52), Max: 98.5 (02 May 2024 04:40)  HR: 62 (02 May 2024 13:00) (59 - 77)  BP: 171/65 (02 May 2024 13:00) (125/108 - 177/63)  BP(mean): 95 (02 May 2024 13:00) (70 - 887)  RR: 17 (02 May 2024 13:00) (12 - 20)  SpO2: 99% (02 May 2024 13:00) (96% - 100%)    Parameters below as of 02 May 2024 12:00  Patient On (Oxygen Delivery Method): room air          Physical Exam:  General: No acute distress.   HEENT: Head normocephalic, atraumatic. Conjunctivae clear w/o exudates or hemorrhage. Sclera non-icteric. Nares are patent bilaterally. Mucous membranes pink and moist.  No tonsillar swelling or exudates.    Neck: Supple, no adenopathy. Trachea midline. No JVD.  Cardiac: Normal rate and rhythm. S1, S2 auscultated. No murmurs, gallops, or rubs.     Respiratory: Lung sounds clear in all fields. Chest wall symmetric, nontender.   Abdominal: Soft, nondistended, nontender. Bowel sounds normoactive x 4 quadrants. No masses, hepatomegaly, or splenomegaly.   Skin: Skin is warm, dry and intact without rashes or lesions. Appropriate color for ethnicity. Nailbeds pink with no cyanosis or clubbing.   Extremities: No edema, 2+ peripheral pulses in b/l upper and b/l lower extremities. Full range of motion in all joints.     Detailed Neurologic Exam:    Mental status: The patient is awake and alert and has normal attention span.  The patient has a receptive aphasia--difficulty with comprehension, but able to follow occasional commands and mimics.  Word salad, difficulty with naming, cannot repeat.     Cranial nerves: Pupils equal and react symmetrically to light. There is no visual field deficit to confrontation. Extraocular motion is full with no nystagmus. There is no ptosis. Facial sensation is intact. Facial musculature is symmetric. Palate elevates symmetrically. Tongue is midline.    Motor: There is normal bulk and tone.  There is no tremor.  Strength is 5-/5 in the right arm, with mild pronator drift and hand  is 4/5, RLE is 5/5  Strength is 5/5 in the left arm and leg.    Sensation: Intact to light touch and pin in 4 extremities    Reflexes: 1-2+ throughout and plantar responses are flexor.    Cerebellar: There is no dysmetria on finger to nose testing.    Gait : deferred    NIH SS:  DATE:  5/2/24 at 13:30    1A: Level of consciousness (0-3): 0  1B: Questions (0-2): 2  1C: Commands (0-2): 2  2: Gaze (0-2): 0  3: Visual fields (0-3): 0  4: Facial palsy (0-3): 0  MOTOR:  5A: Left arm motor drift (0-4): 0  5B: Right arm motor drift (0-4): 0  6A: Left leg motor drift (0-4): 0  6B: Right leg motor drift (0-4): 0  7: Limb ataxia (0-2): 0  SENSORY:  8: Sensation (0-2): 0  SPEECH:  9: Language (0-3): 2  10: Dysarthria (0-2): 0  EXTINCTION:  11: Extinction/inattention (0-2): 0    TOTAL SCORE: 6    prehospital mRS=2      LABS:                         11.4   6.19  )-----------( 152      ( 02 May 2024 13:50 )             35.0       05-02    140  |  104  |  21.1<H>  ----------------------------<  99  3.7   |  23.0  |  0.87    Ca    8.4      02 May 2024 03:30  Phos  3.8     05-02  Mg     2.0     05-02    TPro  8.1  /  Alb  4.0  /  TBili  0.6  /  DBili  x   /  AST  31  /  ALT  31  /  AlkPhos  99  05-01      PT/INR - ( 01 May 2024 10:15 )   PT: 11.3 sec;   INR: 0.96 ratio         PTT - ( 01 May 2024 10:15 )  PTT:36.8 sec    05-02 Chol 154 LDL -- HDL 40<L> Trig 90    A1C:         RADIOLOGY & ADDITIONAL STUDIES (independently reviewed unless otherwise noted):  < from: CT Brain Stroke Protocol (05.01.24 @ 09:58) >  Suspected infarct involving the left anterior insula.    No acute intracranial hemorrhage.    < end of copied text >  < from: CT Angio Neck Stroke Protocol w/ IV Cont (05.01.24 @ 10:06) >  CT angiogram neck:  -No hemodynamically significant stenosis.    CT angiogram head:  -Occlusion of a left MCA proximal M2 branch.    CT perfusion:  -49 mL penumbra identified in the left MCA territory.  -No completed core infarct identified by perfusion analysis, although   this is likely underestimated as suspected infarct is seen on noncontrast   CT in the left anterior insula.    < end of copied text >  < from: CT Head No Cont (05.02.24 @ 10:16) >  Stable hypodensity involving the the inferior left frontal   lobe and left insular ribbon suggesting an acute infarction. No acute   intracranial hemorrhage. Consider MRI for further evaluation.    < end of copied text >

## 2024-05-02 NOTE — OCCUPATIONAL THERAPY INITIAL EVALUATION ADULT - ADDITIONAL COMMENTS
Pt has a tub with curtains and grab bars upstairs and a shower with doors and grab bars downstairs. Pt was independent PTA with a SAC. Pt owns SAC only. Pt is right handed and drives. Pt spouse does not drive.

## 2024-05-02 NOTE — OCCUPATIONAL THERAPY INITIAL EVALUATION ADULT - GROSSLY INTACT, SENSORY
unable to formally assess pt ability to localize however appears intact to LT x 4 extremities/Grossly Intact

## 2024-05-02 NOTE — SPEECH LANGUAGE PATHOLOGY EVALUATION - SLP DIAGNOSIS
Pt presents w/ moderate-severe deficits in primary receptive/expressive language skills. Receptively, pt able to follow simple commands w/ 65% accuracy & answer simple yes/no questions w/ 75% accuracy. Pt benefits from increased time, repetitions and rephrasing. Significant difficulty w/ complex commands and complex yes/ no questions despite max cues. Expressively, +word finding deficits in both structured tasks and conversational discourse. +Perseverations on words, numbers and topics w/ inconsistent awareness. Pt requires cues to initiate and completed automatic speech tasks. Pt unable to complete confrontation naming, semantic naming or fluency tasks. Adequate speech intelligibility noted w/ no dysarthria. Higher level cognitive linguistic skills guarded @ this time 2' severity of language deficits.

## 2024-05-02 NOTE — PHYSICAL THERAPY INITIAL EVALUATION ADULT - GAIT PATTERN USED, PT EVAL
2-point gait [No Acute Distress] : no acute distress [Normal Oropharynx] : normal oropharynx [III] : Mallampati Class: III [Normal Appearance] : normal appearance [No Neck Mass] : no neck mass [Normal Rate/Rhythm] : normal rate/rhythm [Normal S1, S2] : normal s1, s2 [No Murmurs] : no murmurs [No Resp Distress] : no resp distress [Clear to Auscultation Bilaterally] : clear to auscultation bilaterally [No Abnormalities] : no abnormalities [Benign] : benign [Normal Gait] : normal gait [No Clubbing] : no clubbing [No Cyanosis] : no cyanosis [No Edema] : no edema [FROM] : FROM [Normal Color/ Pigmentation] : normal color/ pigmentation [No Focal Deficits] : no focal deficits [Oriented x3] : oriented x3 [Normal Affect] : normal affect [TextBox_[id number]] : I:E 1:3, clear

## 2024-05-02 NOTE — PHYSICAL THERAPY INITIAL EVALUATION ADULT - DIAGNOSIS, PT EVAL
functional debility 2*2 impaired balance, impaired cognitive processing, poor motor planning and aphasia s/p neuro event

## 2024-05-02 NOTE — SPEECH LANGUAGE PATHOLOGY EVALUATION - SLP PERTINENT HISTORY OF CURRENT PROBLEM
As per MD note: "HPI: 89 y/o F with hx of Atrial Fibrillation, not on AC (patient choice), HTN, CHF, Prediabetes, who was transferred from [facility] for LM2 occlusion.  Patient presented yesterday to [facility] ED, LKW at 8:30AM, walked to bathroom, then  heard loud noise and found her with R sided weakness and aphasia.  CTH negative, CTA showed LM2 occlusion. Patient received TNK at 10:33 am and then transferred to [facility] for mechanical thrombectomy. During angiogram, she was found to have a distal LM4 occlusion, so no thrombectomy was performed.  Repeat CT head this AM, stable. "

## 2024-05-02 NOTE — CHART NOTE - NSCHARTNOTEFT_GEN_A_CORE
NSICU DOWN GRADE NOTE    Transfer from: NSICU    Transfer to: ( ) stroke  Accepting Physician:     Signout given to:     HPI / NSICU COURSE  89F with PMH afib, HF, HTN who presents with stroke. Patient's last known well 8:30 am, found down by  this morning with R sided weakness and unable to speak.  She was taken to [facility], code stroke called. CTH negative, CTA showed LM2 occlusion. Patient received TNK at 10:33 am. She was then transferred to [facility] for mechanical thrombectomy. During angiogram, she was found to have a distal LM4 occlusion, so no thrombectomy was performed. She was extubated and admitted to NSICU for post TNK care.     NIH 16 on arrival, mRS 1  DATE: 5/1/24  TIME: 12:00  1A: Level of consciousness (0-3): 0  1B: Questions (0-2): 2    1C: Commands (0-2): 2  2: Gaze (0-2): 1  3: Visual fields (0-3): 0  4: Facial palsy (0-3): 2  MOTOR:  5A: Left arm motor drift (0-4): 0  5B: Right arm motor drift (0-4): 1  6A: Left leg motor drift (0-4): 0  6B: Right leg motor drift (0-4): 2  7: Limb ataxia (0-2): 0  SENSORY:  8: Sensation (0-2): 1  SPEECH:  9: Language (0-3): 2  10: Dysarthria (0-2): 2  EXTINCTION:  11: Extinction/inattention (0-2): 1    TOTAL SCORE: 16 (01 May 2024 14:08)    5/1 admitted to NSICU     REVIEW OF SYSTEMS: Unable to perform     MEDICATIONS:  acetaminophen     Tablet .. 650 milliGRAM(s) Oral every 6 hours PRN  aspirin  chewable 81 milliGRAM(s) Oral daily  atorvastatin 20 milliGRAM(s) Oral at bedtime  chlorhexidine 2% Cloths 1 Application(s) Topical <User Schedule>  dextrose 50% Injectable 25 Gram(s) IV Push once  dextrose 50% Injectable 12.5 Gram(s) IV Push once  enoxaparin Injectable 40 milliGRAM(s) SubCutaneous every 24 hours  furosemide    Tablet 40 milliGRAM(s) Oral daily  insulin lispro (ADMELOG) corrective regimen sliding scale   SubCutaneous every 6 hours  metoprolol succinate ER 50 milliGRAM(s) Oral daily  polyethylene glycol 3350 17 Gram(s) Oral daily  senna 2 Tablet(s) Oral at bedtime      T(C): 36.8 (05-02-24 @ 08:20), Max: 36.9 (05-02-24 @ 04:40)  HR: 63 (05-02-24 @ 10:33) (59 - 77)  BP: 149/68 (05-02-24 @ 10:33) (108/68 - 175/58)  RR: 16 (05-02-24 @ 10:33) (12 - 19)  SpO2: 97% (05-02-24 @ 10:33) (96% - 100%)  Wt(kg): --Vital Signs Last 24 Hrs  T(C): 36.8 (02 May 2024 08:20), Max: 36.9 (02 May 2024 04:40)  T(F): 98.2 (02 May 2024 08:20), Max: 98.5 (02 May 2024 04:40)  HR: 63 (02 May 2024 10:33) (59 - 77)  BP: 149/68 (02 May 2024 10:33) (108/68 - 175/58)  BP(mean): 87 (02 May 2024 10:33) (70 - 887)  RR: 16 (02 May 2024 10:33) (12 - 19)  SpO2: 97% (02 May 2024 10:33) (96% - 100%)          PHYSICAL EXAM  Mental Status:  Awake, alert  Cranial Nerves: R Facial. PERRL,  tongue midline, left gaze deviation  Motor: RUE 3/5, LUE 5/5, RLE 2/5, LLE 5/5  Sensory: Sensation diminished to light touch on R  Cardiovascular:  S1, S2 no murmurs appreciated.  Regular rate and rhythm.  Eyes: See neurologic examination with detailed examination of eyes.  ENT: No JVD, Trachea Midline.  Respiratory: Clear to auscultation.  Gastrointestinal: Soft, nontender, nondistended.  Genitourinary: trial of void.   Musculoskeletal: No muscle wasting noted,  No pretibial edema appreciated, no appreciable calf tenderness.  Skin:  Wound inspected, no redness, bleeding or drainage noted.          Consultant(s) Notes Reviewed:  [x ] YES  [ ] NO  Care Discussed with Consultants/Other Providers [ x] YES  [ ] NO    I & Os    05-01-24 @ 07:01  -  05-02-24 @ 07:00  --------------------------------------------------------  IN: 850 mL / OUT: 1250 mL / NET: -400 mL    05-02-24 @ 07:01  -  05-02-24 @ 10:56  --------------------------------------------------------  IN: 550 mL / OUT: 130 mL / NET: 420 mL      LABS:                        11.2   5.79  )-----------( 141      ( 02 May 2024 03:30 )             33.6     05-02    140  |  104  |  21.1<H>  ----------------------------<  99  3.7   |  23.0  |  0.87    Ca    8.4      02 May 2024 03:30  Phos  3.8     05-02  Mg     2.0     05-02    TPro  8.1  /  Alb  4.0  /  TBili  0.6  /  DBili  x   /  AST  31  /  ALT  31  /  AlkPhos  99  05-01    PT/INR - ( 01 May 2024 10:15 )   PT: 11.3 sec;   INR: 0.96 ratio         PTT - ( 01 May 2024 10:15 )  PTT:36.8 sec  Urinalysis Basic - ( 02 May 2024 03:30 )    Color: x / Appearance: x / SG: x / pH: x  Gluc: 99 mg/dL / Ketone: x  / Bili: x / Urobili: x   Blood: x / Protein: x / Nitrite: x   Leuk Esterase: x / RBC: x / WBC x   Sq Epi: x / Non Sq Epi: x / Bacteria: x      CAPILLARY BLOOD GLUCOSE      POCT Blood Glucose.: 123 mg/dL (02 May 2024 05:10)  POCT Blood Glucose.: 110 mg/dL (01 May 2024 23:55)  POCT Blood Glucose.: 102 mg/dL (01 May 2024 17:53)        Urinalysis Basic - ( 02 May 2024 03:30 )    Color: x / Appearance: x / SG: x / pH: x  Gluc: 99 mg/dL / Ketone: x  / Bili: x / Urobili: x   Blood: x / Protein: x / Nitrite: x   Leuk Esterase: x / RBC: x / WBC x   Sq Epi: x / Non Sq Epi: x / Bacteria: x        IMAGING   5/1 CTH: Suspected infarct involving the left anterior insula. No acute intracranial hemorrhage.  5/1 CTA H/N: Occlusion of a left MCA proximal M2 branch   5/1 CTP: 49ml MCA penumbra territory   5/1 CXR: Mild interstitial CHF   5/2 CTH:table hypodensity involving the the inferior left frontal   lobe and left insular ribbon suggesting an acute infarction. No acute   intracranial hemorrhage.       ASSESSMENT: 89yo female with hx of afib, HF, HTN, BCCarcinoma and SCCarcinoma, and pleural presented to [facility] after being found down by      transferred from [place] to [facility] for thrombectomy of LM2 occlusion found to have LM4 distal occlusion during procedure, therefore thrombectomy not performed       PLAN: NSICU DOWN GRADE NOTE    Transfer from: NSICU    Transfer to: ( x) stroke  Accepting Physician:     Signout given to:     [facility] / NSICU COURSE  89F with PMH afib, HF, HTN who presents with stroke. Patient's last known well 8:30 am, found down by  this morning with R sided weakness and unable to speak.  She was taken to [facility], code stroke called. CTH negative, CTA showed LM2 occlusion. Patient received TNK at 10:33 am. She was then transferred to [facility] for mechanical thrombectomy. During angiogram, she was found to have a distal LM4 occlusion, so no thrombectomy was performed. She was extubated and admitted to NSICU for post TNK care.     NIH 16 on arrival, mRS 1  DATE: 5/1/24  TIME: 12:00  1A: Level of consciousness (0-3): 0  1B: Questions (0-2): 2    1C: Commands (0-2): 2  2: Gaze (0-2): 1  3: Visual fields (0-3): 0  4: Facial palsy (0-3): 2  MOTOR:  5A: Left arm motor drift (0-4): 0  5B: Right arm motor drift (0-4): 1  6A: Left leg motor drift (0-4): 0  6B: Right leg motor drift (0-4): 2  7: Limb ataxia (0-2): 0  SENSORY:  8: Sensation (0-2): 1  SPEECH:  9: Language (0-3): 2  10: Dysarthria (0-2): 2  EXTINCTION:  11: Extinction/inattention (0-2): 1    TOTAL SCORE: 16 (01 May 2024 14:08)    5/1 admitted to NSICU s/p TNK monitoring   5/2 downgrade to Stroke     REVIEW OF SYSTEMS: Unable to obtain d/t receptive aphasia     MEDICATIONS:  acetaminophen     Tablet .. 650 milliGRAM(s) Oral every 6 hours PRN  aspirin  chewable 81 milliGRAM(s) Oral daily  atorvastatin 20 milliGRAM(s) Oral at bedtime  chlorhexidine 2% Cloths 1 Application(s) Topical <User Schedule>  dextrose 50% Injectable 25 Gram(s) IV Push once  dextrose 50% Injectable 12.5 Gram(s) IV Push once  enoxaparin Injectable 40 milliGRAM(s) SubCutaneous every 24 hours  furosemide    Tablet 40 milliGRAM(s) Oral daily  insulin lispro (ADMELOG) corrective regimen sliding scale   SubCutaneous every 6 hours  metoprolol succinate ER 50 milliGRAM(s) Oral daily  polyethylene glycol 3350 17 Gram(s) Oral daily  senna 2 Tablet(s) Oral at bedtime      T(C): 36.8 (05-02-24 @ 08:20), Max: 36.9 (05-02-24 @ 04:40)  HR: 63 (05-02-24 @ 10:33) (59 - 77)  BP: 149/68 (05-02-24 @ 10:33) (108/68 - 175/58)  RR: 16 (05-02-24 @ 10:33) (12 - 19)  SpO2: 97% (05-02-24 @ 10:33) (96% - 100%)  Wt(kg): --Vital Signs Last 24 Hrs  T(C): 36.8 (02 May 2024 08:20), Max: 36.9 (02 May 2024 04:40)  T(F): 98.2 (02 May 2024 08:20), Max: 98.5 (02 May 2024 04:40)  HR: 63 (02 May 2024 10:33) (59 - 77)  BP: 149/68 (02 May 2024 10:33) (108/68 - 175/58)  BP(mean): 87 (02 May 2024 10:33) (70 - 887)  RR: 16 (02 May 2024 10:33) (12 - 19)  SpO2: 97% (02 May 2024 10:33) (96% - 100%)          PHYSICAL EXAM  Mental Status:  Awake, alert, tries to follow simple commands   Cranial Nerves: R facial droop, PERRL, EOMI, no drifts  or dysmetria appreciated   Motor: 5/5 all extremities   Sensory: sensation intact throughout   Cardiovascular:  S1, S2 no murmurs appreciated.  Regular rate and rhythm.  Respiratory: Clear to auscultation.  Gastrointestinal: Soft, nontender, nondistended.  Genitourinary: trial of void.   Musculoskeletal: No muscle wasting noted,  No pretibial edema appreciated, no appreciable calf tenderness.         I & Os    05-01-24 @ 07:01  -  05-02-24 @ 07:00  --------------------------------------------------------  IN: 850 mL / OUT: 1250 mL / NET: -400 mL    05-02-24 @ 07:01  -  05-02-24 @ 10:56  --------------------------------------------------------  IN: 550 mL / OUT: 130 mL / NET: 420 mL      LABS:                        11.2   5.79  )-----------( 141      ( 02 May 2024 03:30 )             33.6     05-02    140  |  104  |  21.1<H>  ----------------------------<  99  3.7   |  23.0  |  0.87    Ca    8.4      02 May 2024 03:30  Phos  3.8     05-02  Mg     2.0     05-02    TPro  8.1  /  Alb  4.0  /  TBili  0.6  /  DBili  x   /  AST  31  /  ALT  31  /  AlkPhos  99  05-01    PT/INR - ( 01 May 2024 10:15 )   PT: 11.3 sec;   INR: 0.96 ratio         PTT - ( 01 May 2024 10:15 )  PTT:36.8 sec  Urinalysis Basic - ( 02 May 2024 03:30 )    Color: x / Appearance: x / SG: x / pH: x  Gluc: 99 mg/dL / Ketone: x  / Bili: x / Urobili: x   Blood: x / Protein: x / Nitrite: x   Leuk Esterase: x / RBC: x / WBC x   Sq Epi: x / Non Sq Epi: x / Bacteria: x      CAPILLARY BLOOD GLUCOSE      POCT Blood Glucose.: 123 mg/dL (02 May 2024 05:10)  POCT Blood Glucose.: 110 mg/dL (01 May 2024 23:55)  POCT Blood Glucose.: 102 mg/dL (01 May 2024 17:53)        Urinalysis Basic - ( 02 May 2024 03:30 )    Color: x / Appearance: x / SG: x / pH: x  Gluc: 99 mg/dL / Ketone: x  / Bili: x / Urobili: x   Blood: x / Protein: x / Nitrite: x   Leuk Esterase: x / RBC: x / WBC x   Sq Epi: x / Non Sq Epi: x / Bacteria: x        IMAGING   5/1 CTH: Suspected infarct involving the left anterior insula. No acute intracranial hemorrhage.  5/1 CTA H/N: Occlusion of a left MCA proximal M2 branch   5/1 CTP: 49ml MCA penumbra territory   5/1 CXR: Mild interstitial CHF   5/2 CTH:table hypodensity involving the the inferior left frontal   lobe and left insular ribbon suggesting an acute infarction. No acute   intracranial hemorrhage.       ASSESSMENT: 89yo female with hx of afib, HF, HTN, BCCarcinoma and SCCarcinoma, and pleural presented to [facility] after being found down by  with right sided weakness and unable to speak. Code stroke at [place], CTH negative, CTA LM2 occlusion found transferred from [place] to [facility] for thrombectomy , found to have LM4 distal occlusion during procedure, therefore thrombectomy not performed. TNK given at 1033 am on 5/1. Transferred to NSICU for post TNK monitoring.       PLAN:  Neuro  - neurochecks q4 hr  -MRI Brain   CV  -TTE  - SBP goal of 110-180  -Continue lipitor 40 and home meds of metropolol 50 qd and lasix 40 qd  GI  - Continue senna and mirlax for bm regiement     - pending void  Heme  - started on lovenox 40 qd on 5/2 for dvt ppx   - ASA 81mg (home med) started on 5/2 NSICU DOWNGRADE NOTE:    Transfer from: NSICU    Transfer to: MEDICINE with Stroke following  Accepting Physician: Sam Thompson    Signout given to: Sam Thompson    HPI / NSICU COURSE  89F with PMH afib, HF, HTN who presents with stroke. Patient's last known well 8:30 am, found down by  this morning with R sided weakness and unable to speak.  She was taken to [facility], code stroke called. CTH negative, CTA showed LM2 occlusion. Patient received TNK at 10:33 am. She was then transferred to [facility] for mechanical thrombectomy. During angiogram, she was found to have a distal LM4 occlusion, so no thrombectomy was performed. She was extubated and admitted to NSICU for post TNK care. CTH post TNK stable with hypodensity involving the the inferior left frontal lobe and left insular ribbon suggesting an acute infarction. No acute intracranial hemorrhage. Patient H/H went from 13.9/42.4 to 11.2/33.6. No sign of GI bleed. Anemia panel sent. Patient signed out to Medicine service Dr. Thompson.       NIH 16 on arrival, mRS 1  DATE: 5/1/24  TIME: 12:00  1A: Level of consciousness (0-3): 0  1B: Questions (0-2): 2    1C: Commands (0-2): 2  2: Gaze (0-2): 1  3: Visual fields (0-3): 0  4: Facial palsy (0-3): 2  MOTOR:  5A: Left arm motor drift (0-4): 0  5B: Right arm motor drift (0-4): 1  6A: Left leg motor drift (0-4): 0  6B: Right leg motor drift (0-4): 2  7: Limb ataxia (0-2): 0  SENSORY:  8: Sensation (0-2): 1  SPEECH:  9: Language (0-3): 2  10: Dysarthria (0-2): 2  EXTINCTION:  11: Extinction/inattention (0-2): 1    TOTAL SCORE: 16 (01 May 2024 14:08)    5/1 admitted to NSICU s/p TNK monitoring   5/2 downgrade to Medicine with stroke to follow    REVIEW OF SYSTEMS: Unable to obtain d/t receptive aphasia     MEDICATIONS:  acetaminophen     Tablet .. 650 milliGRAM(s) Oral every 6 hours PRN  aspirin  chewable 81 milliGRAM(s) Oral daily  atorvastatin 20 milliGRAM(s) Oral at bedtime  chlorhexidine 2% Cloths 1 Application(s) Topical <User Schedule>  dextrose 50% Injectable 25 Gram(s) IV Push once  dextrose 50% Injectable 12.5 Gram(s) IV Push once  enoxaparin Injectable 40 milliGRAM(s) SubCutaneous every 24 hours  furosemide    Tablet 40 milliGRAM(s) Oral daily  insulin lispro (ADMELOG) corrective regimen sliding scale   SubCutaneous every 6 hours  metoprolol succinate ER 50 milliGRAM(s) Oral daily  polyethylene glycol 3350 17 Gram(s) Oral daily  senna 2 Tablet(s) Oral at bedtime      T(C): 36.8 (05-02-24 @ 08:20), Max: 36.9 (05-02-24 @ 04:40)  HR: 63 (05-02-24 @ 10:33) (59 - 77)  BP: 149/68 (05-02-24 @ 10:33) (108/68 - 175/58)  RR: 16 (05-02-24 @ 10:33) (12 - 19)  SpO2: 97% (05-02-24 @ 10:33) (96% - 100%)  Wt(kg): --Vital Signs Last 24 Hrs  T(C): 36.8 (02 May 2024 08:20), Max: 36.9 (02 May 2024 04:40)  T(F): 98.2 (02 May 2024 08:20), Max: 98.5 (02 May 2024 04:40)  HR: 63 (02 May 2024 10:33) (59 - 77)  BP: 149/68 (02 May 2024 10:33) (108/68 - 175/58)  BP(mean): 87 (02 May 2024 10:33) (70 - 887)  RR: 16 (02 May 2024 10:33) (12 - 19)  SpO2: 97% (02 May 2024 10:33) (96% - 100%)          PHYSICAL EXAM  Mental Status:  Awake, alert, tries to follow simple commands   Cranial Nerves: Mild R facial droop, PERRL, EOMI, no drifts  or dysmetria appreciated   Motor: 5/5 all extremities   Sensory: sensation intact throughout   Cardiovascular:  S1, S2 no murmurs appreciated.  Regular rate and irregular rhythm.  Respiratory: Clear to auscultation.  Gastrointestinal: Soft, nontender, nondistended.  Genitourinary: trial of void.   Musculoskeletal: No muscle wasting noted,  No pretibial edema appreciated, no appreciable calf tenderness.  Groin: dressing c/d/i. Mild tenderness. No ecchymosis or hematoma          I & Os    05-01-24 @ 07:01  -  05-02-24 @ 07:00  --------------------------------------------------------  IN: 850 mL / OUT: 1250 mL / NET: -400 mL    05-02-24 @ 07:01  -  05-02-24 @ 10:56  --------------------------------------------------------  IN: 550 mL / OUT: 130 mL / NET: 420 mL      LABS:                        11.2   5.79  )-----------( 141      ( 02 May 2024 03:30 )             33.6     05-02    140  |  104  |  21.1<H>  ----------------------------<  99  3.7   |  23.0  |  0.87    Ca    8.4      02 May 2024 03:30  Phos  3.8     05-02  Mg     2.0     05-02    TPro  8.1  /  Alb  4.0  /  TBili  0.6  /  DBili  x   /  AST  31  /  ALT  31  /  AlkPhos  99  05-01    PT/INR - ( 01 May 2024 10:15 )   PT: 11.3 sec;   INR: 0.96 ratio         PTT - ( 01 May 2024 10:15 )  PTT:36.8 sec  Urinalysis Basic - ( 02 May 2024 03:30 )    Color: x / Appearance: x / SG: x / pH: x  Gluc: 99 mg/dL / Ketone: x  / Bili: x / Urobili: x   Blood: x / Protein: x / Nitrite: x   Leuk Esterase: x / RBC: x / WBC x   Sq Epi: x / Non Sq Epi: x / Bacteria: x      CAPILLARY BLOOD GLUCOSE      POCT Blood Glucose.: 123 mg/dL (02 May 2024 05:10)  POCT Blood Glucose.: 110 mg/dL (01 May 2024 23:55)  POCT Blood Glucose.: 102 mg/dL (01 May 2024 17:53)        Urinalysis Basic - ( 02 May 2024 03:30 )    Color: x / Appearance: x / SG: x / pH: x  Gluc: 99 mg/dL / Ketone: x  / Bili: x / Urobili: x   Blood: x / Protein: x / Nitrite: x   Leuk Esterase: x / RBC: x / WBC x   Sq Epi: x / Non Sq Epi: x / Bacteria: x        IMAGING   5/1 CTH: Suspected infarct involving the left anterior insula. No acute intracranial hemorrhage.  5/1 CTA H/N: Occlusion of a left MCA proximal M2 branch   5/1 CTP: 49ml MCA penumbra territory   5/1 CXR: Mild interstitial CHF   5/2 CTH:table hypodensity involving the the inferior left frontal   lobe and left insular ribbon suggesting an acute infarction. No acute   intracranial hemorrhage.       ASSESSMENT: 89yo female with hx of afib, HF, HTN, BCCarcinoma and SCCarcinoma, and pleural presented to [facility] after being found down by  with right sided weakness and unable to speak. Code stroke at [place], CTH negative, CTA LM2 occlusion found transferred from [place] to [facility] for thrombectomy , found to have LM4 distal occlusion during procedure, therefore thrombectomy not performed. TNK given at 1033 am on 5/1. Transferred to NSICU for post TNK monitoring.       PLAN:  Neuro  - neurochecks q4 hr  - MRI Brain     CV  -TTE  - SBP goal of 110-180  -Continue lipitor 40 and home meds of metropolol 50 qd and lasix 40 qd    GI  - Continue senna and mirlax for bm regiment  - Last BM outpatient        - pending void    Heme  - started on lovenox 40 qd on 5/2 for dvt ppx   - Holding AC until MRI/TTE and discussion with Neurology  - ASA 81mg (home med) started on 5/2  - Monitor CBC  - Anemia panel ordered    PT/OT following  SLP following    Patient stable for downgrade to medical floor. Patient signed out to hospitalist Dr. Thompson. Discussed diagnosis, interventions, testing, results and pending workup. All questions answered. NSICU DOWNGRADE NOTE:    Transfer from: NSICU    Transfer to: MEDICINE with Stroke following  Accepting Physician: Sam Thompson    Signout given to: Sam Thompson    HPI / NSICU COURSE  89F with PMH afib, HF, HTN who presents with stroke. Patient's last known well 8:30 am, found down by  this morning with R sided weakness and unable to speak.  She was taken to [facility], code stroke called. CTH negative, CTA showed LM2 occlusion. Patient received TNK at 10:33 am. She was then transferred to [facility] for mechanical thrombectomy. During angiogram, she was found to have a distal LM4 occlusion, so no thrombectomy was performed. She was extubated and admitted to NSICU for post TNK care. CTH post TNK stable with hypodensity involving the the inferior left frontal lobe and left insular ribbon suggesting an acute infarction. No acute intracranial hemorrhage. Patient H/H went from 13.9/42.4 to 11.2/33.6. No sign of GI bleed. Anemia panel sent. Patient signed out to Medicine service Dr. Thompson.       NIH 16 on arrival, mRS 1  DATE: 5/1/24  TIME: 12:00  1A: Level of consciousness (0-3): 0  1B: Questions (0-2): 2    1C: Commands (0-2): 2  2: Gaze (0-2): 1  3: Visual fields (0-3): 0  4: Facial palsy (0-3): 2  MOTOR:  5A: Left arm motor drift (0-4): 0  5B: Right arm motor drift (0-4): 1  6A: Left leg motor drift (0-4): 0  6B: Right leg motor drift (0-4): 2  7: Limb ataxia (0-2): 0  SENSORY:  8: Sensation (0-2): 1  SPEECH:  9: Language (0-3): 2  10: Dysarthria (0-2): 2  EXTINCTION:  11: Extinction/inattention (0-2): 1    TOTAL SCORE: 16 (01 May 2024 14:08)    5/1 admitted to NSICU s/p TNK monitoring   5/2 downgrade to Medicine with stroke to follow    REVIEW OF SYSTEMS: Unable to obtain d/t receptive aphasia     MEDICATIONS:  acetaminophen     Tablet .. 650 milliGRAM(s) Oral every 6 hours PRN  aspirin  chewable 81 milliGRAM(s) Oral daily  atorvastatin 20 milliGRAM(s) Oral at bedtime  chlorhexidine 2% Cloths 1 Application(s) Topical <User Schedule>  dextrose 50% Injectable 25 Gram(s) IV Push once  dextrose 50% Injectable 12.5 Gram(s) IV Push once  enoxaparin Injectable 40 milliGRAM(s) SubCutaneous every 24 hours  furosemide    Tablet 40 milliGRAM(s) Oral daily  insulin lispro (ADMELOG) corrective regimen sliding scale   SubCutaneous every 6 hours  metoprolol succinate ER 50 milliGRAM(s) Oral daily  polyethylene glycol 3350 17 Gram(s) Oral daily  senna 2 Tablet(s) Oral at bedtime      T(C): 36.8 (05-02-24 @ 08:20), Max: 36.9 (05-02-24 @ 04:40)  HR: 63 (05-02-24 @ 10:33) (59 - 77)  BP: 149/68 (05-02-24 @ 10:33) (108/68 - 175/58)  RR: 16 (05-02-24 @ 10:33) (12 - 19)  SpO2: 97% (05-02-24 @ 10:33) (96% - 100%)  Wt(kg): --Vital Signs Last 24 Hrs  T(C): 36.8 (02 May 2024 08:20), Max: 36.9 (02 May 2024 04:40)  T(F): 98.2 (02 May 2024 08:20), Max: 98.5 (02 May 2024 04:40)  HR: 63 (02 May 2024 10:33) (59 - 77)  BP: 149/68 (02 May 2024 10:33) (108/68 - 175/58)  BP(mean): 87 (02 May 2024 10:33) (70 - 887)  RR: 16 (02 May 2024 10:33) (12 - 19)  SpO2: 97% (02 May 2024 10:33) (96% - 100%)          PHYSICAL EXAM  Mental Status:  Awake, alert, tries to follow simple commands   Cranial Nerves: Mild R facial droop, PERRL, EOMI, no drifts  or dysmetria appreciated   Motor: 5/5 all extremities   Sensory: sensation intact throughout   Cardiovascular:  S1, S2 no murmurs appreciated.  Regular rate and irregular rhythm.  Respiratory: Clear to auscultation.  Gastrointestinal: Soft, nontender, nondistended.  Genitourinary: trial of void.   Musculoskeletal: No muscle wasting noted,  No pretibial edema appreciated, no appreciable calf tenderness.  Groin: dressing c/d/i. Mild tenderness. No ecchymosis or hematoma          I & Os    05-01-24 @ 07:01  -  05-02-24 @ 07:00  --------------------------------------------------------  IN: 850 mL / OUT: 1250 mL / NET: -400 mL    05-02-24 @ 07:01  -  05-02-24 @ 10:56  --------------------------------------------------------  IN: 550 mL / OUT: 130 mL / NET: 420 mL      LABS:                        11.2   5.79  )-----------( 141      ( 02 May 2024 03:30 )             33.6     05-02    140  |  104  |  21.1<H>  ----------------------------<  99  3.7   |  23.0  |  0.87    Ca    8.4      02 May 2024 03:30  Phos  3.8     05-02  Mg     2.0     05-02    TPro  8.1  /  Alb  4.0  /  TBili  0.6  /  DBili  x   /  AST  31  /  ALT  31  /  AlkPhos  99  05-01    PT/INR - ( 01 May 2024 10:15 )   PT: 11.3 sec;   INR: 0.96 ratio         PTT - ( 01 May 2024 10:15 )  PTT:36.8 sec  Urinalysis Basic - ( 02 May 2024 03:30 )    Color: x / Appearance: x / SG: x / pH: x  Gluc: 99 mg/dL / Ketone: x  / Bili: x / Urobili: x   Blood: x / Protein: x / Nitrite: x   Leuk Esterase: x / RBC: x / WBC x   Sq Epi: x / Non Sq Epi: x / Bacteria: x      CAPILLARY BLOOD GLUCOSE      POCT Blood Glucose.: 123 mg/dL (02 May 2024 05:10)  POCT Blood Glucose.: 110 mg/dL (01 May 2024 23:55)  POCT Blood Glucose.: 102 mg/dL (01 May 2024 17:53)        Urinalysis Basic - ( 02 May 2024 03:30 )    Color: x / Appearance: x / SG: x / pH: x  Gluc: 99 mg/dL / Ketone: x  / Bili: x / Urobili: x   Blood: x / Protein: x / Nitrite: x   Leuk Esterase: x / RBC: x / WBC x   Sq Epi: x / Non Sq Epi: x / Bacteria: x        IMAGING   5/1 CTH: Suspected infarct involving the left anterior insula. No acute intracranial hemorrhage.  5/1 CTA H/N: Occlusion of a left MCA proximal M2 branch   5/1 CTP: 49ml MCA [place]   5/1 CXR: Mild interstitial CHF   5/2 CTH:table hypodensity involving the the inferior left frontal   lobe and left insular ribbon suggesting an acute infarction. No acute   intracranial hemorrhage.       ASSESSMENT: 89yo female with hx of afib, HF, HTN, BCCarcinoma and SCCarcinoma, and pleural presented to [facility] after being found down by  with right sided weakness and unable to speak. Code stroke at [place], CTH negative, CTA LM2 occlusion found transferred from [place] to [facility] for thrombectomy , found to have LM4 distal occlusion during procedure, therefore thrombectomy not performed. TNK given at 1033 am on 5/1. Transferred to NSICU for post TNK monitoring.       PLAN:  Neuro  - neurochecks q4 hr  - MRI Brain     CV  -TTE  - SBP goal of 110-180  -Continue lipitor 40 and home meds of metropolol 50 qd and lasix 40 qd    GI  - Continue senna and mirlax for bm regiment  - Last BM outpatient        - pending void    Heme  - started on lovenox 40 qd on 5/2 for dvt ppx   - Holding AC until MRI/TTE and discussion with Neurology  - ASA 81mg (home med) started on 5/2  - Monitor CBC  - Anemia panel ordered    PT/OT following  SLP following    Patient stable for downgrade to medical floor. Patient signed out to hospitalist Dr. Thompson. Discussed diagnosis, interventions, testing, results and pending workup. All questions answered.    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 Likely cardioembolic.  PMH afib (not on AC), HF, HTN.    Plan:  neurochecks, protected sleep time  start ASA 81 daily for now; will need full AC - on hold for 3-5 days, pending MRI brain to assess stroke burden and timing of AC   stroke core measures  start Lipitor 20 daily (elderly pt)  maintain -180; restart home Metoprolol, Lasix  maintain Osats>92%  d/c IVf, diet as tolerated, BM regimen  monitor e-lytes. TOV  maintain -180, ISS  restart iron supplements  SCDs, SQL for VTE ppx    Time spent - 35 min, non-critical, included review of relevant history, clinical examination, review of data and images, discussion of treatment with the multidisciplinary team and any consultants involved in this patient’s care as well as family discussion.

## 2024-05-02 NOTE — OCCUPATIONAL THERAPY INITIAL EVALUATION ADULT - GENERAL OBSERVATIONS, REHAB EVAL
Left pt as received seated in bedside chair, +alarm, +IV, +Tele//BP, +Mott on RA +aphasic with spouse (Bam) and daughter (Kami) bedside. Pre/post pain 0/10. Pt agreeable to OT evaluation

## 2024-05-02 NOTE — PHYSICAL THERAPY INITIAL EVALUATION ADULT - LONG TERM MEMORY, REHAB EVAL
Called patient back and discussed referral for mohs surgery in detail. Letter sent to her via [employer] well. Prior auth placed per patient's request. She agreed to call me with any questions or concerns.     Mohs surgery will be 6-13-23 at 8 am    impaired

## 2024-05-02 NOTE — OCCUPATIONAL THERAPY INITIAL EVALUATION ADULT - NS ASR FOLLOW COMMAND OT EVAL
pt requires/responds better to visual cues/demonstration and hand over hand. Pt with impaired sequencing, motor planning, problem solving and processing speeds/50% of the time pt requires/responds better to visual cues/demonstration and hand over hand. Pt with impaired sequencing, motor planning, problem solving and processing speeds. Pt perseverates./50% of the time

## 2024-05-02 NOTE — PHYSICAL THERAPY INITIAL EVALUATION ADULT - SENSORY TESTS
(+) eye tracking bilaterally in all directions; Unable to assess acuity, and pt unable to follow commands for finger to thumb coordination BUEs

## 2024-05-02 NOTE — PHYSICAL THERAPY INITIAL EVALUATION ADULT - IMPAIRMENTS FOUND, PT EVAL
arousal, attention, and cognition/cognitive impairment/gait, locomotion, and balance/muscle strength/poor safety awareness/ventilation and respiration/gas exchange

## 2024-05-02 NOTE — OCCUPATIONAL THERAPY INITIAL EVALUATION ADULT - SPECIAL TRAINING, OT EVAL
pt requires max cues with demonstration and hand over hand to participate in ADL assessment 2*aphasia

## 2024-05-02 NOTE — OCCUPATIONAL THERAPY INITIAL EVALUATION ADULT - DIAGNOSIS, OT EVAL
89 year old Female with PMH afib, HF, HTN. Found down by  with R sided weakness and unable to speak. She was taken to [facility], code stroke called. CTH negative, CTA showed Left M2 occlusion. Pt received TNK at 10:33 am. She was then transferred to [facility] for mechanical thrombectomy. During angiogram, she was found to have a distal LM4 occlusion, so no thrombectomy was performed. She was extubated and admitted to NSICU for post TNK care.

## 2024-05-02 NOTE — OCCUPATIONAL THERAPY INITIAL EVALUATION ADULT - PHYSICAL ASSIST/NONPHYSICAL ASSIST: STAND/SIT, REHAB EVAL
2nd assist for line management/safety/verbal cues/nonverbal cues (demo/gestures)/1 person + 1 person to manage equipment

## 2024-05-02 NOTE — OCCUPATIONAL THERAPY INITIAL EVALUATION ADULT - ORIENTATION, REHAB EVAL
First name only, unable to give correctly identify location with choices however, was able to ID spouse and daughter by name but not by relation.

## 2024-05-02 NOTE — OCCUPATIONAL THERAPY INITIAL EVALUATION ADULT - VISUAL ACUITY
+glasses (present at eval); no complaints offered. Unable to assess acuity 2* pt cognition and decreased command following

## 2024-05-02 NOTE — OCCUPATIONAL THERAPY INITIAL EVALUATION ADULT - LIVES WITH, PROFILE
Pt lives in a private hospitals level home with her spouse who can minimally assist. Daughter is currently visiting but lives in [place], unable to assist. 10 steps to enter with handrails, 6 steps inside with handrails to upstairs bedrooms/bath or down to living area./spouse

## 2024-05-02 NOTE — OCCUPATIONAL THERAPY INITIAL EVALUATION ADULT - ASSISTIVE DEVICE FOR TRANSFER: STAND/SIT, REHAB EVAL
Nursing Transfer Note      11/18/2019     Transfer From: ED To: 330A    Transfer via stretcher    Transfer with cardiac monitoring    Transported by transport personnel    Medicines sent: none    Chart send with patient: Yes    Notified: family    Patient reassessed at: 2220 on November 18, 2019    Upon arrival to floor patient walked from stretcher to bed with generalized weakness noted. Vital signs obtained and found in flow sheets with complete patient assessment. Skin dry and intact with a 22g LH PIV noted saline locked. Complaints of left sided chest pain noted and to be treated with prn analgesics. Plan to maintain npo status after midnight, pain management, and await further input from the provider. Patient updated on plan of care and verbalized understanding. Call light in reach and will continue to be monitored.   
rolling walker

## 2024-05-02 NOTE — PHYSICAL THERAPY INITIAL EVALUATION ADULT - PERTINENT HX OF CURRENT PROBLEM, REHAB EVAL
89F with PMH afib, HF, HTN. Found down by  with Right sided weakness and unable to speak.  She was taken to [facility], code stroke called. CTH negative, CTA showed Left M2 occlusion. Patient received TNK at 10:33 am. She was then transferred to [facility] for mechanical thrombectomy. During angiogram, she was found to have a distal LM4 occlusion, so no thrombectomy was performed. She was extubated and admitted to NSICU for post TNK care

## 2024-05-02 NOTE — PHYSICAL THERAPY INITIAL EVALUATION ADULT - ADDITIONAL COMMENTS
Pt lives in a private home with her spouse. Daughter is currently visiting but lives in [place]. 10 steps to enter with handrails, 6 steps inside with handrails to upstairs bedrooms/bath or down to living area. Pt was independent PTA with a SAC. Pt owns SAC only.

## 2024-05-02 NOTE — OCCUPATIONAL THERAPY INITIAL EVALUATION ADULT - SENSORY TESTS
(+) eye tracking bilaterally in all directions; Unable to assess acuity, and pt unable to follow commands for finger to thumb coordination BUEs No complaints in sensation offered

## 2024-05-02 NOTE — PHYSICAL THERAPY INITIAL EVALUATION ADULT - GENERAL OBSERVATIONS, REHAB EVAL
Pt received seated in bedside chair + telemetry//BP + martinez. Pt aphasic, A&O x1, c/o 0/10 pain, pt/ family at bedside agreeable to PT

## 2024-05-02 NOTE — PROGRESS NOTE ADULT - ASSESSMENT
89F with PMH afib, HF, HTN who presents with stroke. Patient's last known well 8:30 am, found down by  this morning with R sided weakness and unable to speak.  She was taken to [facility], code stroke called. CTH negative, CTA showed LM2 occlusion. Patient received TNK at 10:33 am. She was then transferred to [facility] for mechanical thrombectomy. During angiogram, she was found to have a distal LM4 occlusion, so no thrombectomy was performed. She was extubated and admitted to NSICU for post TNK care. CTH post TNK stable with hypodensity involving the the inferior left frontal lobe and left insular ribbon suggesting an acute infarction    #cva- nsicu note appreciated  - neuro consult appreciaed  - asa and high dose statin   - tte and mri pending    #afib- no ac for now  - only when cleared by neuro     #anemia - iron stuides appreciated    #diet - reg diet       89F with PMH afib (informed by her cardiologist that she should take eliquis but refused as per daughter), HF, HTN who presents with stroke. Patient's last known well 8:30 am, found down by  this morning with R sided weakness and unable to speak.  She was taken to [facility], code stroke called. CTH negative, CTA showed LM2 occlusion. Patient received TNK. She was then transferred to [facility] for mechanical thrombectomy. During angiogram, she was found to have a distal LM4 occlusion, so no thrombectomy was performed. She was extubated and admitted to NSICU for post TNK care. CTH post TNK stable with hypodensity involving the the inferior left frontal lobe and left insular ribbon suggesting an acute infarction. Patient is now being downgraded to medicine and neuro consulted    #cva- nsicu note appreciated  - neuro consult appreciaed  - asa and high dose statin   - tte and mri pending    #afib- no ac for now  - only when cleared by neuro     #anemia - iron stuides appreciated    #diet - reg diet    dispo - pmr consult appreciated  - daughter wants ar vs shamika

## 2024-05-02 NOTE — PHYSICAL THERAPY INITIAL EVALUATION ADULT - IMPAIRMENTS CONTRIBUTING TO GAIT DEVIATIONS, PT EVAL
+ GRIFFITH noted, SPO2 maintained WNL on RA, Pt requires demonstration and gestures as she is unable to follow verbal commands. + verbal perseverative behavior noted as well/impaired balance/cognition/impaired coordination/impaired postural control/decreased strength

## 2024-05-02 NOTE — PHYSICAL THERAPY INITIAL EVALUATION ADULT - ORIENTATION, REHAB EVAL
Date of Service: 07/22/2019    The patient was last seen 10/2017.  She has a known history of moderate to moderately severe sleep apnea syndrome based on polysomnography 2015, at which time the AHI was 26, 33 supine.    The patient never sleeps without her CPAP machine and derives benefit.    The data download of 07/19/2019 shows 100% compliance, averaging 8.4 hours per night, with a median delivered pressure of 9 cm and a 95th percentile delivered pressure of 11.2 cm without mask leakage or residual AHI.  The patient obtains supplies from [employer]    The patient quit cigarette smoking several years ago and she continues to use Albuterol inhaler on occasion.      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  The patient has a history of rheumatoid arthritis and congestive heart failure.  Other medical problems include hypertension, obesity, hyperlipidemia, and anxiety.    PHYSICAL EXAMINATION:  LUNGS:  Clear, no wheezing or rales were noted.  HEART:  Heart tones were regular.  No murmurs or gallops were appreciated.  EXTREMITIES:  Without clubbing, cyanosis or edema.    A data download of 07/19/2019 from [employer] shows 100% compliance, averaging 8.4 hours per night, with a median delivered pressure of 9 cm and a 95th percentile delivered pressure of 11.2 cm, while of auto titrate CPAP with a range of 7-16 cm.    We reviewed a variety of suggestions regarding sleep hygiene, sleep apnea, and CPAP maintenance.  The patient will be reevaluated in 6 months, sooner if needed.      Dictated By: Francisco Arroyo MD  Signing Provider: MD CHIQUIS Nino/BEL ([id number])  DD: 07/22/2019 15:56:20 TD: 07/23/2019 22:46:09    Copy Sent To:    First name only, unable to give correctly identify location with choices however, was able to ID spouse and daughter by name but not by relation.

## 2024-05-03 ENCOUNTER — RESULT REVIEW (OUTPATIENT)
Age: 89
End: 2024-05-03

## 2024-05-03 LAB
ALBUMIN SERPL ELPH-MCNC: 3.5 G/DL — SIGNIFICANT CHANGE UP (ref 3.3–5.2)
ALP SERPL-CCNC: 78 U/L — SIGNIFICANT CHANGE UP (ref 40–120)
ALT FLD-CCNC: 18 U/L — SIGNIFICANT CHANGE UP
ANION GAP SERPL CALC-SCNC: 13 MMOL/L — SIGNIFICANT CHANGE UP (ref 5–17)
AST SERPL-CCNC: 29 U/L — SIGNIFICANT CHANGE UP
BASOPHILS # BLD AUTO: 0.02 K/UL — SIGNIFICANT CHANGE UP (ref 0–0.2)
BASOPHILS NFR BLD AUTO: 0.3 % — SIGNIFICANT CHANGE UP (ref 0–2)
BILIRUB SERPL-MCNC: 0.6 MG/DL — SIGNIFICANT CHANGE UP (ref 0.4–2)
BUN SERPL-MCNC: 21 MG/DL — HIGH (ref 8–20)
CALCIUM SERPL-MCNC: 8.7 MG/DL — SIGNIFICANT CHANGE UP (ref 8.4–10.5)
CHLORIDE SERPL-SCNC: 104 MMOL/L — SIGNIFICANT CHANGE UP (ref 96–108)
CO2 SERPL-SCNC: 22 MMOL/L — SIGNIFICANT CHANGE UP (ref 22–29)
CREAT SERPL-MCNC: 0.96 MG/DL — SIGNIFICANT CHANGE UP (ref 0.5–1.3)
EGFR: 56 ML/MIN/1.73M2 — LOW
EOSINOPHIL # BLD AUTO: 0.12 K/UL — SIGNIFICANT CHANGE UP (ref 0–0.5)
EOSINOPHIL NFR BLD AUTO: 1.7 % — SIGNIFICANT CHANGE UP (ref 0–6)
GLUCOSE BLDC GLUCOMTR-MCNC: 108 MG/DL — HIGH (ref 70–99)
GLUCOSE BLDC GLUCOMTR-MCNC: 111 MG/DL — HIGH (ref 70–99)
GLUCOSE SERPL-MCNC: 126 MG/DL — HIGH (ref 70–99)
HCT VFR BLD CALC: 34.2 % — LOW (ref 34.5–45)
HGB BLD-MCNC: 11.4 G/DL — LOW (ref 11.5–15.5)
IMM GRANULOCYTES NFR BLD AUTO: 0.6 % — SIGNIFICANT CHANGE UP (ref 0–0.9)
LYMPHOCYTES # BLD AUTO: 0.81 K/UL — LOW (ref 1–3.3)
LYMPHOCYTES # BLD AUTO: 11.7 % — LOW (ref 13–44)
MAGNESIUM SERPL-MCNC: 1.8 MG/DL — SIGNIFICANT CHANGE UP (ref 1.6–2.6)
MCHC RBC-ENTMCNC: 29.9 PG — SIGNIFICANT CHANGE UP (ref 27–34)
MCHC RBC-ENTMCNC: 33.3 GM/DL — SIGNIFICANT CHANGE UP (ref 32–36)
MCV RBC AUTO: 89.8 FL — SIGNIFICANT CHANGE UP (ref 80–100)
MONOCYTES # BLD AUTO: 0.83 K/UL — SIGNIFICANT CHANGE UP (ref 0–0.9)
MONOCYTES NFR BLD AUTO: 12 % — SIGNIFICANT CHANGE UP (ref 2–14)
NEUTROPHILS # BLD AUTO: 5.12 K/UL — SIGNIFICANT CHANGE UP (ref 1.8–7.4)
NEUTROPHILS NFR BLD AUTO: 73.7 % — SIGNIFICANT CHANGE UP (ref 43–77)
PLATELET # BLD AUTO: 129 K/UL — LOW (ref 150–400)
POTASSIUM SERPL-MCNC: 3.9 MMOL/L — SIGNIFICANT CHANGE UP (ref 3.5–5.3)
POTASSIUM SERPL-SCNC: 3.9 MMOL/L — SIGNIFICANT CHANGE UP (ref 3.5–5.3)
PROT SERPL-MCNC: 6 G/DL — LOW (ref 6.6–8.7)
RBC # BLD: 3.81 M/UL — SIGNIFICANT CHANGE UP (ref 3.8–5.2)
RBC # FLD: 14.6 % — HIGH (ref 10.3–14.5)
SODIUM SERPL-SCNC: 139 MMOL/L — SIGNIFICANT CHANGE UP (ref 135–145)
WBC # BLD: 6.94 K/UL — SIGNIFICANT CHANGE UP (ref 3.8–10.5)
WBC # FLD AUTO: 6.94 K/UL — SIGNIFICANT CHANGE UP (ref 3.8–10.5)

## 2024-05-03 PROCEDURE — 93975 VASCULAR STUDY: CPT | Mod: 26

## 2024-05-03 PROCEDURE — 93306 TTE W/DOPPLER COMPLETE: CPT | Mod: 26

## 2024-05-03 PROCEDURE — 99223 1ST HOSP IP/OBS HIGH 75: CPT | Mod: GC

## 2024-05-03 PROCEDURE — 99233 SBSQ HOSP IP/OBS HIGH 50: CPT

## 2024-05-03 RX ORDER — AMLODIPINE BESYLATE 2.5 MG/1
5 TABLET ORAL DAILY
Refills: 0 | Status: DISCONTINUED | OUTPATIENT
Start: 2024-05-03 | End: 2024-05-08

## 2024-05-03 RX ORDER — FERROUS SULFATE 325(65) MG
325 TABLET ORAL DAILY
Refills: 0 | Status: DISCONTINUED | OUTPATIENT
Start: 2024-05-03 | End: 2024-05-08

## 2024-05-03 RX ORDER — LABETALOL HCL 100 MG
10 TABLET ORAL ONCE
Refills: 0 | Status: COMPLETED | OUTPATIENT
Start: 2024-05-03 | End: 2024-05-03

## 2024-05-03 RX ORDER — MEMANTINE HYDROCHLORIDE 10 MG/1
5 TABLET ORAL AT BEDTIME
Refills: 0 | Status: DISCONTINUED | OUTPATIENT
Start: 2024-05-03 | End: 2024-05-08

## 2024-05-03 RX ORDER — HYDRALAZINE HCL 50 MG
10 TABLET ORAL ONCE
Refills: 0 | Status: COMPLETED | OUTPATIENT
Start: 2024-05-03 | End: 2024-05-03

## 2024-05-03 RX ADMIN — Medication 10 MILLIGRAM(S): at 22:02

## 2024-05-03 RX ADMIN — POLYETHYLENE GLYCOL 3350 17 GRAM(S): 17 POWDER, FOR SOLUTION ORAL at 12:39

## 2024-05-03 RX ADMIN — MEMANTINE HYDROCHLORIDE 5 MILLIGRAM(S): 10 TABLET ORAL at 22:02

## 2024-05-03 RX ADMIN — Medication 81 MILLIGRAM(S): at 12:38

## 2024-05-03 RX ADMIN — CHLORHEXIDINE GLUCONATE 1 APPLICATION(S): 213 SOLUTION TOPICAL at 05:23

## 2024-05-03 RX ADMIN — ATORVASTATIN CALCIUM 40 MILLIGRAM(S): 80 TABLET, FILM COATED ORAL at 22:02

## 2024-05-03 RX ADMIN — Medication 0.1 MILLIGRAM(S): at 02:05

## 2024-05-03 RX ADMIN — Medication 40 MILLIGRAM(S): at 05:22

## 2024-05-03 RX ADMIN — SENNA PLUS 2 TABLET(S): 8.6 TABLET ORAL at 22:01

## 2024-05-03 RX ADMIN — Medication 50 MILLIGRAM(S): at 05:22

## 2024-05-03 RX ADMIN — Medication 10 MILLIGRAM(S): at 00:47

## 2024-05-03 RX ADMIN — ENOXAPARIN SODIUM 40 MILLIGRAM(S): 100 INJECTION SUBCUTANEOUS at 12:39

## 2024-05-03 NOTE — PROGRESS NOTE ADULT - SUBJECTIVE AND OBJECTIVE BOX
Preliminary note, official recommendations pending attending review/signature   Seen and examined by Stroke team attending/team, assessment/ plan as discussed with stroke team attending/team as noted.     [facility] Stroke Team  Progress Note     HPI: 89 y/o F with hx of Atrial Fibrillation, not on AC (patient choice), HTN, CHF, Prediabetes, who was transferred from [facility] for LM2 occlusion.  Patient presented yesterday to [facility] ED, LKW at 8:30AM, walked to bathroom, then  heard loud noise and found her with R sided weakness and aphasia.  CTH negative, CTA showed LM2 occlusion. Patient received TNK at 10:33 am and then transferred to [facility] for mechanical thrombectomy. During angiogram, she was found to have a distal LM4 occlusion, so no thrombectomy was performed.  Repeat CT head this AM, stable.      Per family: patient was not on AC by choice--they state she is sensitive to medications so she never wanted to start AC.      SUBJECTIVE: No events overnight.  at bedside. No new neurologic complaints. ROS reported negative unless otherwise noted.    acetaminophen     Tablet .. 650 milliGRAM(s) Oral every 6 hours PRN  amLODIPine   Tablet 5 milliGRAM(s) Oral daily  aspirin  chewable 81 milliGRAM(s) Oral daily  atorvastatin 40 milliGRAM(s) Oral at bedtime  chlorhexidine 2% Cloths 1 Application(s) Topical <User Schedule>  dextrose 50% Injectable 25 Gram(s) IV Push once  dextrose 50% Injectable 12.5 Gram(s) IV Push once  enoxaparin Injectable 40 milliGRAM(s) SubCutaneous every 24 hours  furosemide    Tablet 40 milliGRAM(s) Oral daily  insulin lispro (ADMELOG) corrective regimen sliding scale   SubCutaneous every 6 hours  metoprolol succinate ER 50 milliGRAM(s) Oral daily  polyethylene glycol 3350 17 Gram(s) Oral daily  senna 2 Tablet(s) Oral at bedtime    PHYSICAL EXAM:   Vital Signs Last 24 Hrs  T(C): 36.8 (03 May 2024 08:01), Max: 37.3 (02 May 2024 20:00)  T(F): 98.2 (03 May 2024 08:01), Max: 99.1 (02 May 2024 20:00)  HR: 59 (03 May 2024 08:01) (59 - 78)  BP: 159/52 (03 May 2024 08:01) (149/70 - 210/68)  BP(mean): --  RR: 18 (03 May 2024 08:01) (18 - 18)  SpO2: 99% (03 May 2024 05:00) (98% - 99%)    Parameters below as of 03 May 2024 08:01  Patient On (Oxygen Delivery Method): room air    Physical Exam:  General: No acute distress.   HEENT: Head normocephalic, atraumatic. Conjunctivae clear w/o exudates or hemorrhage. Sclera non-icteric. Nares are patent bilaterally. Mucous membranes pink and moist.  No tonsillar swelling or exudates.    Neck: Supple, no adenopathy. Trachea midline. No JVD.  Cardiac: Normal rate and rhythm. S1, S2 auscultated. No murmurs, gallops, or rubs.     Respiratory: Lung sounds clear in all fields. Chest wall symmetric, nontender.   Abdominal: Soft, nondistended, nontender. Bowel sounds normoactive x 4 quadrants. No masses, hepatomegaly, or splenomegaly.   Skin: Skin is warm, dry and intact without rashes or lesions. Appropriate color for ethnicity. Nailbeds pink with no cyanosis or clubbing.   Extremities: No edema, 2+ peripheral pulses in b/l upper and b/l lower extremities. Full range of motion in all joints.     Detailed Neurologic Exam:  Mental status: The patient is awake and alert and has normal attention span. She is oriented to hame, month (year incorrect) and place as [facility]. Difficulty with comprehension, but able to follow most commands and mimics. Difficulty with naming some objects; able to repeat.  Cranial nerves: Pupils equal and react symmetrically to light. There is no visual field deficit to confrontation. Extraocular motion is full with no nystagmus. There is no ptosis. Facial sensation is intact. Facial musculature is symmetric. Palate elevates symmetrically. Tongue is midline.  Motor: There is normal bulk and tone.  There is no tremor.  Strength is 5-/5 in the right arm, with mild pronator drift and hand  is 4/5, RLE is 5/5  Strength is 5/5 in the left arm and leg.  Sensation: Intact to light touch and pin in 4 extremities  Cerebellar: There is no dysmetria on finger to nose testing.  Gait : deferred    LABS:                        11.4   6.94  )-----------( 129      ( 03 May 2024 04:40 )             34.2    05-03    139  |  104  |  21.0<H>  ----------------------------<  126<H>  3.9   |  22.0  |  0.96    Ca    8.7      03 May 2024 04:40  Phos  3.8     05-02  Mg     1.8     05-03    TPro  6.0<L>  /  Alb  3.5  /  TBili  0.6  /  DBili  x   /  AST  29  /  ALT  18  /  AlkPhos  78  05-03        05-02 Chol 154 LDL -- HDL 40<L> Trig 90    A1C:    IMAGING: Reviewed by me.   CT angiogram neck:  -No hemodynamically significant stenosis.    CT angiogram head:  -Occlusion of a left MCA proximal M2 branch.    CT perfusion:  -49 mL penumbra identified in the left MCA territory.  -No completed core infarct identified by perfusion analysis, although   this is likely underestimated as suspected infarct is seen on noncontrast   CT in the left anterior insula.    < end of copied text >  < from: CT Head No Cont (05.02.24 @ 10:16) >  Stable hypodensity involving the the inferior left frontal   lobe and left insular ribbon suggesting an acute infarction. No acute   intracranial hemorrhage. Consider MRI for further evaluation.       [facility] Stroke Team  Progress Note     HPI: 89 y/o F with hx of Atrial Fibrillation, not on AC (patient choice), HTN, CHF, Prediabetes, who was transferred from [facility] for LM2 occlusion.  Patient presented yesterday to [facility] ED, LKW at 8:30AM, walked to bathroom, then  heard loud noise and found her with R sided weakness and aphasia.  CTH negative, CTA showed LM2 occlusion. Patient received TNK at 10:33 am and then transferred to [facility] for mechanical thrombectomy. During angiogram, she was found to have a distal LM4 occlusion, so no thrombectomy was performed.  Repeat CT head this AM, stable.      Per family: patient was not on AC by choice--they state she is sensitive to medications so she never wanted to start AC.      SUBJECTIVE: No events overnight.  at bedside. No new neurologic complaints. ROS reported negative unless otherwise noted.    acetaminophen     Tablet .. 650 milliGRAM(s) Oral every 6 hours PRN  amLODIPine   Tablet 5 milliGRAM(s) Oral daily  aspirin  chewable 81 milliGRAM(s) Oral daily  atorvastatin 40 milliGRAM(s) Oral at bedtime  chlorhexidine 2% Cloths 1 Application(s) Topical <User Schedule>  dextrose 50% Injectable 25 Gram(s) IV Push once  dextrose 50% Injectable 12.5 Gram(s) IV Push once  enoxaparin Injectable 40 milliGRAM(s) SubCutaneous every 24 hours  furosemide    Tablet 40 milliGRAM(s) Oral daily  insulin lispro (ADMELOG) corrective regimen sliding scale   SubCutaneous every 6 hours  metoprolol succinate ER 50 milliGRAM(s) Oral daily  polyethylene glycol 3350 17 Gram(s) Oral daily  senna 2 Tablet(s) Oral at bedtime    PHYSICAL EXAM:   Vital Signs Last 24 Hrs  T(C): 36.8 (03 May 2024 08:01), Max: 37.3 (02 May 2024 20:00)  T(F): 98.2 (03 May 2024 08:01), Max: 99.1 (02 May 2024 20:00)  HR: 59 (03 May 2024 08:01) (59 - 78)  BP: 159/52 (03 May 2024 08:01) (149/70 - 210/68)  BP(mean): --  RR: 18 (03 May 2024 08:01) (18 - 18)  SpO2: 99% (03 May 2024 05:00) (98% - 99%)    Parameters below as of 03 May 2024 08:01  Patient On (Oxygen Delivery Method): room air    Physical Exam:  General: No acute distress.   HEENT: Head normocephalic, atraumatic. Conjunctivae clear w/o exudates or hemorrhage. Sclera non-icteric. Nares are patent bilaterally. Mucous membranes pink and moist.  No tonsillar swelling or exudates.    Neck: Supple, no adenopathy. Trachea midline. No JVD.  Cardiac: Normal rate and rhythm. S1, S2 auscultated. No murmurs, gallops, or rubs.     Respiratory: Lung sounds clear in all fields. Chest wall symmetric, nontender.   Abdominal: Soft, nondistended, nontender. Bowel sounds normoactive x 4 quadrants. No masses, hepatomegaly, or splenomegaly.   Skin: Skin is warm, dry and intact without rashes or lesions. Appropriate color for ethnicity. Nailbeds pink with no cyanosis or clubbing.   Extremities: No edema, 2+ peripheral pulses in b/l upper and b/l lower extremities. Full range of motion in all joints.     Detailed Neurologic Exam:  Mental status: The patient is awake and alert and has normal attention span. She is oriented to [facility], month (year incorrect) and place as [facility]. Difficulty with comprehension, but able to follow most commands and mimics. Difficulty with naming some objects; able to repeat.  Cranial nerves: Pupils equal and react symmetrically to light. There is no visual field deficit to confrontation. Extraocular motion is full with no nystagmus. There is no ptosis. Facial sensation is intact. Facial musculature is symmetric. Palate elevates symmetrically. Tongue is midline.  Motor: There is normal bulk and tone.  There is no tremor.  Strength is 5-/5 in the right arm, with mild pronator drift and hand  is 4/5, RLE is 5/5  Strength is 5/5 in the left arm and leg.  Sensation: Intact to light touch and pin in 4 extremities  Cerebellar: There is no dysmetria on finger to nose testing.  Gait : deferred    LABS:                        11.4   6.94  )-----------( 129      ( 03 May 2024 04:40 )             34.2    05-03    139  |  104  |  21.0<H>  ----------------------------<  126<H>  3.9   |  22.0  |  0.96    Ca    8.7      03 May 2024 04:40  Phos  3.8     05-02  Mg     1.8     05-03    TPro  6.0<L>  /  Alb  3.5  /  TBili  0.6  /  DBili  x   /  AST  29  /  ALT  18  /  AlkPhos  78  05-03 05-02 Chol 154 LDL -- HDL 40<L> Trig 90    A1C:    IMAGING: Reviewed by me.   CT angiogram neck:  -No hemodynamically significant stenosis.    CT angiogram head:  -Occlusion of a left MCA proximal M2 branch.    CT perfusion:  -49 mL penumbra identified in the left MCA territory.  -No completed core infarct identified by perfusion analysis, although   this is likely underestimated as suspected infarct is seen on noncontrast   CT in the left anterior insula.    < end of copied text >  < from: CT Head No Cont (05.02.24 @ 10:16) >  Stable hypodensity involving the the inferior left frontal   lobe and left insular ribbon suggesting an acute infarction. No acute   intracranial hemorrhage. Consider MRI for further evaluation.

## 2024-05-03 NOTE — DIETITIAN INITIAL EVALUATION ADULT - PERTINENT MEDS FT
MEDICATIONS  (STANDING):  amLODIPine   Tablet 5 milliGRAM(s) Oral daily  dextrose 50% Injectable 12.5 Gram(s) IV Push once  enoxaparin Injectable 40 milliGRAM(s) SubCutaneous every 24 hours  ferrous    sulfate 325 milliGRAM(s) Oral daily  furosemide    Tablet 40 milliGRAM(s) Oral daily  insulin lispro (ADMELOG) corrective regimen sliding scale   SubCutaneous every 6 hours  memantine 5 milliGRAM(s) Oral at bedtime  metoprolol succinate ER 50 milliGRAM(s) Oral daily  polyethylene glycol 3350 17 Gram(s) Oral daily  senna 2 Tablet(s) Oral at bedtime

## 2024-05-03 NOTE — DIETITIAN INITIAL EVALUATION ADULT - OTHER INFO
89F with PMH afib (informed by her cardiologist that she should take eliquis but refused as per daughter), HF, HTN who presents with stroke. Patient's last known well 8:30 am, found down by  this morning with R sided weakness and unable to speak.  She was taken to [facility], code stroke called. CTH negative, CTA showed LM2 occlusion. Patient received TNK. She was then transferred to [facility] for mechanical thrombectomy. During angiogram, she was found to have a distal LM4 occlusion, so no thrombectomy was performed. She was extubated and admitted to NSICU for post TNK care. CTH post TNK stable with hypodensity involving the the inferior left frontal lobe and left insular ribbon suggesting an acute infarction. Patient is downgraded to medicine pm 5/2 and neuro and pmr consulted

## 2024-05-03 NOTE — PROGRESS NOTE ADULT - ASSESSMENT
ASSESSMENT: 89 y/o F with hx of Atrial Fibrillation, not on AC (patient choice), HTN, CHF, Prediabetes, who was transferred from [facility] for LM2 occlusion.  Patient presented yesterday to [facility] ED, LKW at 8:30AM, walked to bathroom, then  heard loud noise and found her with R sided weakness and aphasia.  CTH negative, CTA showed LM2 occlusion. Patient received TNK at 10:33 am and then transferred to [facility] for mechanical thrombectomy. During angiogram, she was found to have a distal LM4 occlusion, so no thrombectomy was performed.    NEURO:   -Continue close monitoring for neurologic deterioration    -Stroke neuro checks q 4h   -SBP goal normotension  -ANTITHROMBOTIC THERAPY: ASA 81mg for now. Will need to consider anticoagulation pending MRI Brain and anemia w/u  -titrate statin to LDL goal less than 70 - on atorvastatin 40 mg daily  -MRI Brain w/o pending  -Dysphagia screen: pass   -Physical therapy/OT/Speech eval/treatment.     -CARDIOVASCULAR: check TTE, cardiac monitoring w/ telemetry for now, further evaluation pending findings of noted workup                              -HEMATOLOGY: H/H 11.4/34.2, Platelets 129, patient should have all age and risk appropriate malignancy screenings with PCP or sooner if clinically suspected      DVT ppx: Heparin s.c [] LMWH [x]     PULMONARY: CXR with mild interstitial CHF, protecting airway, saturating well     RENAL: BUN/Cr 21/0.96, monitor urine output, maintain adequate hydration       Na Goal:  135-145     Mott: N    ID: afebrile, no leukocytosis, monitor for si/sx of infection     OTHER:  condition and plan of care d/w patient and  at bedside, questions and concerns addressed.     DISPOSITION: Rehab or home depending on PT eval once stable and workup is complete    CORE MEASURES:        Admission NIHSS: 6     Tenecteplase : [x] YES [] NO      LDL/HDL/A1C: 96/40/5.5     Depression Screen- if depression hx and/or present      Statin Therapy: Atorvastatin     Dysphagia Screen: [x] PASS [] FAIL     Smoking [] YES [x] NO      Afib [x] YES [] NO     Stroke Education [] YES [] NO Pending    Obtain screening lower extremity venous ultrasound in patients who meet 1 or more of the following criteria as patient is high risk for DVT/PE on admission:   [] History of DVT/PE  []Hypercoagulable states (Factor V Leiden, Cancer, OCP, etc. )  []Prolonged immobility (hemiplegia/hemiparesis/post operative or any other extended immobilization)  [] Transferred from outside facility (Rehab or Long term care)  [] Age </= to 50 ASSESSMENT: 89 y/o F with hx of Atrial Fibrillation, not on AC (patient choice), HTN, CHF, Prediabetes, who was transferred from [facility] for LM2 occlusion.  Patient presented yesterday to [facility] ED, LKW at 8:30AM, walked to bathroom, then  heard loud noise and found her with R sided weakness and aphasia.  CTH negative, CTA showed LM2 occlusion. Patient received TNK at 10:33 am and then transferred to [facility] for mechanical thrombectomy. During angiogram, she was found to have a distal LM4 occlusion, so no thrombectomy was performed.    Neurologically with aphasia, improving.     NEURO:   -Continue close monitoring for neurologic deterioration    -Stroke neuro checks q 4h   -SBP goal normotension  -ANTITHROMBOTIC THERAPY: ASA 81mg for now. Will need to consider anticoagulation pending MRI Brain and anemia w/u  -titrate statin to LDL goal less than 70 - on atorvastatin 40 mg daily  -MRI Brain w/o pending  -Dysphagia screen: pass   -Physical therapy/OT/Speech eval/treatment.     -CARDIOVASCULAR: check TTE, cardiac monitoring w/ telemetry for now, further evaluation pending findings of noted workup                              -HEMATOLOGY: H/H 11.4/34.2, Platelets 129, patient should have all age and risk appropriate malignancy screenings with PCP or sooner if clinically suspected      DVT ppx: Heparin s.c [] LMWH [x]     PULMONARY: CXR with mild interstitial CHF, protecting airway, saturating well     RENAL: BUN/Cr 21/0.96, monitor urine output, maintain adequate hydration       Na Goal:  135-145     Mott: N    ID: afebrile, no leukocytosis, monitor for si/sx of infection     OTHER:  condition and plan of care d/w patient and  at bedside, questions and concerns addressed.     DISPOSITION: Rehab or home depending on PT eval once stable and workup is complete    CORE MEASURES:        Admission NIHSS: 6     Tenecteplase : [x] YES [] NO      LDL/HDL/A1C: 96/40/5.5     Depression Screen- if depression hx and/or present      Statin Therapy: Atorvastatin     Dysphagia Screen: [x] PASS [] FAIL     Smoking [] YES [x] NO      Afib [x] YES [] NO     Stroke Education [] YES [] NO Pending    Obtain screening lower extremity venous ultrasound in patients who meet 1 or more of the following criteria as patient is high risk for DVT/PE on admission:   [] History of DVT/PE  []Hypercoagulable states (Factor V Leiden, Cancer, OCP, etc. )  []Prolonged immobility (hemiplegia/hemiparesis/post operative or any other extended immobilization)  [] Transferred from outside facility (Rehab or Long term care)  [] Age </= to 50

## 2024-05-03 NOTE — CONSULT NOTE ADULT - ATTENDING COMMENTS
Patient seen and examined. Case discussed with Dr. Lopez. Agree with recommendations.     Rehab/Impaired mobility and function - Patient continues to require hospitalization for the above diagnoses and ongoing active management of comorbid complications  that are substantially impairing functional ability and impairing quality of life.     RECOMMEND - OOB daily     When medically optimized, based on the patient's diagnosis, current functional status and potential for progress, recommend ACUTE inpatient rehabilitation for the functional deficits consisting of 3 hours of therapy/day & 24 hour RN/daily PMR physician for comorbid medical management. Patient will be able to tolerate 3 hours a day.    Will continue to follow. Rehab recommendations are dependent on how functional progress changes as well as how patient continues to participate and tolerate therapeutic interventions, which may change. Recommend ongoing mobilization by staff to maintain cardiopulmonary function and prevention of secondary complications related to debility. Discussed the specific management and recommendations above with rehab clinical care team/rehab liaison.      Total Time Spent on Encounter (reviewing clinical notes, labs, radiology, medications, patient history/exam, assessment and plan) - 75 minutes
Yes - the patient is able to be screened

## 2024-05-03 NOTE — PROGRESS NOTE ADULT - SUBJECTIVE AND OBJECTIVE BOX
LAURI MENDEZ    [id number]    89y      Female    INTERVAL HPI/OVERNIGHT EVENTS:  patient being seen for cva and med manageemnt. overngiht patient had spikes in bp and daughter at bedside states this has been a problem for a long time.     patient is awake and alert but mildly confused    REVIEW OF SYSTEMS:    CONSTITUTIONAL: No fever, weight loss, or fatigue  RESPIRATORY: No cough, wheezing, hemoptysis; No shortness of breath  CARDIOVASCULAR: No chest pain, palpitations  GASTROINTESTINAL: No abdominal or epigastric pain. No nausea, vomiting  NEUROLOGICAL: No headaches, memory loss, loss of strength.  MISCELLANEOUS:      Vital Signs Last 24 Hrs  T(C): 36.8 (03 May 2024 08:01), Max: 37.3 (02 May 2024 20:00)  T(F): 98.2 (03 May 2024 08:01), Max: 99.1 (02 May 2024 20:00)  HR: 59 (03 May 2024 08:01) (59 - 78)  BP: 159/52 (03 May 2024 08:01) (149/70 - 210/68)  BP(mean): --  RR: 18 (03 May 2024 08:01) (18 - 18)  SpO2: 99% (03 May 2024 05:00) (98% - 99%)    Parameters below as of 03 May 2024 08:01  Patient On (Oxygen Delivery Method): room air        PHYSICAL EXAM:    GENERAL: NAD, well-groomed  HEENT: facial droop   NECK: soft, Supple, No JVD,   CHEST/LUNG: Clear to auscultation bilaterally; No wheezing  HEART: S1S2+, Regular rate and rhythm; No murmurs, rubs, or gallops  ABDOMEN: Soft, Nontender, Nondistended; Bowel sounds present  EXTREMITIES:  2+ Peripheral Pulses, No clubbing, cyanosis, or edema  SKIN: No rashes or lesions  NEURO: Awake, alert, mildly confused        LABS:                        11.4   6.94  )-----------( 129      ( 03 May 2024 04:40 )             34.2     05-03    139  |  104  |  21.0<H>  ----------------------------<  126<H>  3.9   |  22.0  |  0.96    Ca    8.7      03 May 2024 04:40  Phos  3.8     05-02  Mg     1.8     05-03    TPro  6.0<L>  /  Alb  3.5  /  TBili  0.6  /  DBili  x   /  AST  29  /  ALT  18  /  AlkPhos  78  05-03      Urinalysis Basic - ( 03 May 2024 04:40 )    Color: x / Appearance: x / SG: x / pH: x  Gluc: 126 mg/dL / Ketone: x  / Bili: x / Urobili: x   Blood: x / Protein: x / Nitrite: x   Leuk Esterase: x / RBC: x / WBC x   Sq Epi: x / Non Sq Epi: x / Bacteria: x          MEDICATIONS  (STANDING):  amLODIPine   Tablet 5 milliGRAM(s) Oral daily  aspirin  chewable 81 milliGRAM(s) Oral daily  atorvastatin 40 milliGRAM(s) Oral at bedtime  chlorhexidine 2% Cloths 1 Application(s) Topical <User Schedule>  dextrose 50% Injectable 12.5 Gram(s) IV Push once  dextrose 50% Injectable 25 Gram(s) IV Push once  enoxaparin Injectable 40 milliGRAM(s) SubCutaneous every 24 hours  furosemide    Tablet 40 milliGRAM(s) Oral daily  insulin lispro (ADMELOG) corrective regimen sliding scale   SubCutaneous every 6 hours  memantine 5 milliGRAM(s) Oral at bedtime  metoprolol succinate ER 50 milliGRAM(s) Oral daily  polyethylene glycol 3350 17 Gram(s) Oral daily  senna 2 Tablet(s) Oral at bedtime    MEDICATIONS  (PRN):  acetaminophen     Tablet .. 650 milliGRAM(s) Oral every 6 hours PRN Temp greater or equal to 38C (100.4F), Mild Pain (1 - 3)      RADIOLOGY & ADDITIONAL TESTS:

## 2024-05-03 NOTE — DIETITIAN INITIAL EVALUATION ADULT - ORAL INTAKE PTA/DIET HISTORY
Patient tolerating diet well with good appetite/PO intake. Family present at bedside and report pt has been enjoying the food with good PO intake. PTA pt with good appetite and follows a no added salt diet. No c/o N/V/C/D. States her UBW is about 140 lbs, current weight 142 lbs. Pt/family with no reports of unintentional weight loss in the past year. Pt/family with no further nutrition-related questions or concerns at this time. RD contact information provided for further nutrition-related questions or concerns. Will continue to monitor and follow up as needed. RD remains available.

## 2024-05-03 NOTE — CONSULT NOTE ADULT - SUBJECTIVE AND OBJECTIVE BOX
89yF was admitted on 05-01    In ED, GCS=    Patient is a 89y old  Female who presents with a chief complaint of stroke (02 May 2024 15:57)    HPI:  89F with PMH afib, HF, HTN who presents with stroke. Patient's last known well 8:30 am, found down by  this morning with R sided weakness and unable to speak.  She was taken to [facility], code stroke called. CTH negative, CTA showed LM2 occlusion. Patient received TNK at 10:33 am. She was then transferred to [facility] for mechanical thrombectomy. During angiogram, she was found to have a distal LM4 occlusion, so no thrombectomy was performed. She was extubated and admitted to NSICU for post TNK care.     NIH 16 on arrival, mRS 1  DATE: 5/1/24  TIME: 12:00  1A: Level of consciousness (0-3): 0  1B: Questions (0-2): 2    1C: Commands (0-2): 2  2: Gaze (0-2): 1  3: Visual fields (0-3): 0  4: Facial palsy (0-3): 2  MOTOR:  5A: Left arm motor drift (0-4): 0  5B: Right arm motor drift (0-4): 1  6A: Left leg motor drift (0-4): 0  6B: Right leg motor drift (0-4): 2  7: Limb ataxia (0-2): 0  SENSORY:  8: Sensation (0-2): 1  SPEECH:  9: Language (0-3): 2  10: Dysarthria (0-2): 2  EXTINCTION:  11: Extinction/inattention (0-2): 1    TOTAL SCORE: 16 (01 May 2024 14:08)      Imaging showed (reviewed):    < from: CT Head No Cont (05.02.24 @ 10:16) >  IMPRESSION: Stable hypodensity involving the the inferior left frontal   lobe and left insular ribbon suggesting an acute infarction. No acute   intracranial hemorrhage. Consider MRI for further evaluation.    < end of copied text >  < from: CT Angio Brain Stroke Protocol  w/ IV Cont (05.01.24 @ 10:10) >  IMPRESSION:  CT angiogram neck:  -No hemodynamically significant stenosis.    CT angiogram head:  -Occlusion of a left MCA proximal M2 branch.    CT perfusion:  -49 mL penumbra identified in the left MCA territory.  -No completed core infarct identified by perfusion analysis, although   this is likely underestimated as suspected infarct is seen on noncontrast   CT in the left anterior insula.    < end of copied text >    REVIEW OF SYSTEMS  Constitutional - No fever, No weight loss, No fatigue  HEENT - No eye pain, No visual disturbances, No difficulty hearing, No tinnitus, No vertigo, No neck pain  Respiratory - No cough, No wheezing, No shortness of breath  Cardiovascular - No chest pain, No palpitations  Gastrointestinal - No abdominal pain, No nausea, No vomiting, No diarrhea, No constipation  Genitourinary - No dysuria, No frequency, No hematuria, No incontinence  Neurological - No headaches, No memory loss, No loss of strength, No numbness, No tremors  Skin - No itching, No rashes, No lesions   Endocrine - No temperature intolerance  Musculoskeletal - No joint pain, No joint swelling, No muscle pain  Psychiatric - No depression, No anxiety    VITALS  T(C): 36.8 (05-03-24 @ 08:01), Max: 37.3 (05-02-24 @ 20:00)  HR: 59 (05-03-24 @ 08:01) (59 - 78)  BP: 159/52 (05-03-24 @ 08:01) (149/68 - 210/68)  RR: 18 (05-03-24 @ 08:01) (15 - 20)  SpO2: 99% (05-03-24 @ 05:00) (97% - 100%)  Wt(kg): --    PAST MEDICAL & SURGICAL HISTORY  H/O pleural effusion    HTN, age 0-18    HTN (hypertension)    Hyperlipidemia    Prediabetes    Basal cell carcinoma    Atrial fibrillation    History of total right hip replacement        SOCIAL HISTORY  Smoking - Denied  EtOH - Denied   Drugs - Denied    FUNCTIONAL HISTORY  Lives   Independent    CURRENT FUNCTIONAL STATUS      FAMILY HISTORY   No pertinent family history in first degree relatives        RECENT LABS/IMAGING  CBC Full  -  ( 03 May 2024 04:40 )  WBC Count : 6.94 K/uL  RBC Count : 3.81 M/uL  Hemoglobin : 11.4 g/dL  Hematocrit : 34.2 %  Platelet Count - Automated : 129 K/uL  Mean Cell Volume : 89.8 fl  Mean Cell Hemoglobin : 29.9 pg  Mean Cell Hemoglobin Concentration : 33.3 gm/dL  Auto Neutrophil # : 5.12 K/uL  Auto Lymphocyte # : 0.81 K/uL  Auto Monocyte # : 0.83 K/uL  Auto Eosinophil # : 0.12 K/uL  Auto Basophil # : 0.02 K/uL  Auto Neutrophil % : 73.7 %  Auto Lymphocyte % : 11.7 %  Auto Monocyte % : 12.0 %  Auto Eosinophil % : 1.7 %  Auto Basophil % : 0.3 %    05-03    139  |  104  |  21.0<H>  ----------------------------<  126<H>  3.9   |  22.0  |  0.96    Ca    8.7      03 May 2024 04:40  Phos  3.8     05-02  Mg     1.8     05-03    TPro  6.0<L>  /  Alb  3.5  /  TBili  0.6  /  DBili  x   /  AST  29  /  ALT  18  /  AlkPhos  78  05-03    Urinalysis Basic - ( 03 May 2024 04:40 )    Color: x / Appearance: x / SG: x / pH: x  Gluc: 126 mg/dL / Ketone: x  / Bili: x / Urobili: x   Blood: x / Protein: x / Nitrite: x   Leuk Esterase: x / RBC: x / WBC x   Sq Epi: x / Non Sq Epi: x / Bacteria: x        ALLERGIES  penicillins (Swelling)  penicillin (Unknown)  latex (Rash)      MEDICATIONS   acetaminophen     Tablet .. 650 milliGRAM(s) Oral every 6 hours PRN  amLODIPine   Tablet 5 milliGRAM(s) Oral daily  aspirin  chewable 81 milliGRAM(s) Oral daily  atorvastatin 40 milliGRAM(s) Oral at bedtime  chlorhexidine 2% Cloths 1 Application(s) Topical <User Schedule>  dextrose 50% Injectable 25 Gram(s) IV Push once  dextrose 50% Injectable 12.5 Gram(s) IV Push once  enoxaparin Injectable 40 milliGRAM(s) SubCutaneous every 24 hours  furosemide    Tablet 40 milliGRAM(s) Oral daily  insulin lispro (ADMELOG) corrective regimen sliding scale   SubCutaneous every 6 hours  metoprolol succinate ER 50 milliGRAM(s) Oral daily  polyethylene glycol 3350 17 Gram(s) Oral daily  senna 2 Tablet(s) Oral at bedtime      ----------------------------------------------------------------------------------------  PHYSICAL EXAM  Constitutional - NAD, Comfortable  HEENT - NCAT, EOMI  Neck - Supple, No limited ROM  Chest - Breathing comfortably, No wheezing  Cardiovascular - S1S2   Abdomen - Soft   Extremities - No C/C/E, No calf tenderness   Neurologic Exam -                    Cognitive - Awake, Alert, AAO to self, place, date, year, situation     Communication - Fluent, No dysarthria     Cranial Nerves - CN 2-12 intact     Motor - No focal deficits                    LEFT    UE - ShAB 5/5, EF 5/5, EE 5/5, WE 5/5,  5/5                    RIGHT UE - ShAB 5/5, EF 5/5, EE 5/5, WE 5/5,  5/5                    LEFT    LE - HF 5/5, KE 5/5, DF 5/5, PF 5/5                    RIGHT LE - HF 5/5, KE 5/5, DF 5/5, PF 5/5        Sensory - Intact to LT     Reflexes - DTR Intact, No primitive reflexive     Coordination - FTN intact     OculoVestibular - No saccades, No nystagmus, VOR         Balance - WNL Static  Psychiatric - Mood stable, Affect WNL  ----------------------------------------------------------------------------------------  ASSESSMENT/PLAN  89yFemale with functional deficits after  Pain - Tylenol  DVT PPX - SCDs  Rehab - Will continue to follow for ongoing rehab needs and recommendations.    Recommend ACUTE inpatient rehabilitation for the functional deficits consisting of 3 hours of therapy/day & 24 hour RN/daily PMR physician for comorbid medical management. Patient will be able to tolerate 3 hours a day.   Recommend SHEMAR, patient DOES NOT meet acute inpatient rehabilitation criteria   Expect patient to achieve functional goals for DC HOME with OUTPATIENT   Expect patient to achieve functional goals for DC HOME with HOME CARE   Follow up with CONCUSSION PROGRAM - Call [phone] for an appointment  Will sign off, please reconsult if needed for rehab dispo recommendations.    Patient is a 89y old  Female who presents with a chief complaint of stroke (02 May 2024 15:57)    HPI:  89F with PMH afib, HF, HTN who presents with stroke. Patient's last known well 8:30 am, found down by  this morning with R sided weakness and unable to speak.  She was taken to [facility], code stroke called. CTH negative, CTA showed LM2 occlusion. Patient received TNK at 10:33 am. She was then transferred to [facility] for mechanical thrombectomy. During angiogram, she was found to have a distal LM4 occlusion, so no thrombectomy was performed. She was extubated and admitted to NSICU for post TNK care.       Imaging showed (reviewed):    < from: CT Head No Cont (05.02.24 @ 10:16) >  IMPRESSION: Stable hypodensity involving the the inferior left frontal   lobe and left insular ribbon suggesting an acute infarction. No acute   intracranial hemorrhage. Consider MRI for further evaluation.    < end of copied text >  < from: CT Angio Brain Stroke Protocol  w/ IV Cont (05.01.24 @ 10:10) >  IMPRESSION:  CT angiogram neck:  -No hemodynamically significant stenosis.    CT angiogram head:  -Occlusion of a left MCA proximal M2 branch.    CT perfusion:  -49 mL penumbra identified in the left MCA territory.  -No completed core infarct identified by perfusion analysis, although   this is likely underestimated as suspected infarct is seen on noncontrast   CT in the left anterior insula.    < end of copied text >    REVIEW OF SYSTEMS  Constitutional - No fever, No weight loss, No fatigue  HEENT - No eye pain, No visual disturbances, No difficulty hearing, No tinnitus, No vertigo, No neck pain  Respiratory - No cough, No wheezing, No shortness of breath  Cardiovascular - No chest pain, No palpitations  Gastrointestinal - No abdominal pain, No nausea, No vomiting, No diarrhea, No constipation  Genitourinary - No dysuria, No frequency, No hematuria, No incontinence  Neurological - No headaches, + memory loss, + loss of strength, No numbness, No tremors  Musculoskeletal - No joint pain, No joint swelling, No muscle pain  Psychiatric - No depression, No anxiety    VITALS  T(C): 36.8 (05-03-24 @ 08:01), Max: 37.3 (05-02-24 @ 20:00)  HR: 59 (05-03-24 @ 08:01) (59 - 78)  BP: 159/52 (05-03-24 @ 08:01) (149/68 - 210/68)  RR: 18 (05-03-24 @ 08:01) (15 - 20)  SpO2: 99% (05-03-24 @ 05:00) (97% - 100%)  Wt(kg): --    PAST MEDICAL & SURGICAL HISTORY  H/O pleural effusion  HTN, age 0-18  HTN (hypertension)  Hyperlipidemia  Prediabetes  Basal cell carcinoma  Atrial fibrillation  History of total right hip replacement        FUNCTIONAL HISTORY  Pt lives in a private home with her spouse. Daughter is currently visiting but lives in [place]. 10 steps to enter with handrails, 6 steps inside with handrails to upstairs bedrooms/bath or down to living area. Pt was independent PTA with a SAC. Pt owns SAC onlyCURRENT FUNCTIONAL STATUS  SLP 5/2     Pt presents w/ moderate-severe deficits in primary receptive/expressive language skills. Receptively, pt able to follow simple commands w/ 65% accuracy & answer simple yes/no questions w/ 75% accuracy. Pt benefits from increased time, repetitions and rephrasing. Significant difficulty w/ complex commands and complex yes/ no questions despite max cues. Expressively, +word finding deficits in both structured tasks and conversational discourse. +Perseverations on words, numbers and topics w/ inconsistent awareness. Pt requires cues to initiate and completed automatic speech tasks. Pt unable to complete confrontation naming, semantic naming or fluency tasks. Adequate speech intelligibility noted w/ no dysarthria. Higher level cognitive linguistic skills guarded @ this time 2' severity of language deficits.    OT 5/2  Bathing Training:     · Level of [place]	moderate assist (50% patients effort)    Upper Body Dressing Training:     · Level of [place]	moderate assist (50% patients effort)    Lower Body Dressing Training:     · Level of [place]	moderate assist (50% patients effort)    Toilet Hygiene Training:     · Level of [place]	minimum assist (75% patients effort)    Grooming Training:     · Level of [place]	minimum assist (75% patients effort)    Eating/Self-Feeding Training:     · Level of [place]	did not directly observe    Special Training:   · Special Training	pt requires max cues with demonstration and hand over hand to participate in ADL assessment 2*aphasia      PT 5/2    Transfer: Sit to Stand:     · Level of [place]	minimum assist (75% patients effort)  · Physical Assist/Nonphysical Assist	1 person + 1 person to manage equipment; 2nd assist for line management/safety    Transfer: Stand to Sit:     · Level of [place]	minimum assist (75% patients effort)  · Physical Assist/Nonphysical Assist	1 person + 1 person to manage equipment; 2nd assist for line management/safety  · Weight-Bearing Restrictions	weight-bearing as tolerated  · Assistive Device	rolling walker    Sit/Stand Transfer Safety Analysis:     · Transfer Safety Concerns Noted	decreased safety awareness; decreased weight-shifting ability; decreased sequencing ability  · Impairments Contributing to Impaired Transfers	impaired balance; impaired postural control; decreased strength; cognition; impaired coordination    Gait Skills:     · Level of [place]	minimum assist (75% patients effort)  · Physical Assist/Nonphysical Assist	1 person + 1 person to manage equipment; 2nd assist for line management/safety  · Weight-Bearing Restrictions	weight-bearing as tolerated  · Assistive Device	rolling walker  · Gait Distance	25 feet    FAMILY HISTORY   No pertinent family history in first degree relatives        RECENT LABS/IMAGING  CBC Full  -  ( 03 May 2024 04:40 )  WBC Count : 6.94 K/uL  RBC Count : 3.81 M/uL  Hemoglobin : 11.4 g/dL  Hematocrit : 34.2 %  Platelet Count - Automated : 129 K/uL  Mean Cell Volume : 89.8 fl  Mean Cell Hemoglobin : 29.9 pg  Mean Cell Hemoglobin Concentration : 33.3 gm/dL  Auto Neutrophil # : 5.12 K/uL  Auto Lymphocyte # : 0.81 K/uL  Auto Monocyte # : 0.83 K/uL  Auto Eosinophil # : 0.12 K/uL  Auto Basophil # : 0.02 K/uL  Auto Neutrophil % : 73.7 %  Auto Lymphocyte % : 11.7 %  Auto Monocyte % : 12.0 %  Auto Eosinophil % : 1.7 %  Auto Basophil % : 0.3 %    05-03    139  |  104  |  21.0<H>  ----------------------------<  126<H>  3.9   |  22.0  |  0.96    Ca    8.7      03 May 2024 04:40  Phos  3.8     05-02  Mg     1.8     05-03    TPro  6.0<L>  /  Alb  3.5  /  TBili  0.6  /  DBili  x   /  AST  29  /  ALT  18  /  AlkPhos  78  05-03    Urinalysis Basic - ( 03 May 2024 04:40 )    Color: x / Appearance: x / SG: x / pH: x  Gluc: 126 mg/dL / Ketone: x  / Bili: x / Urobili: x   Blood: x / Protein: x / Nitrite: x   Leuk Esterase: x / RBC: x / WBC x   Sq Epi: x / Non Sq Epi: x / Bacteria: x        ALLERGIES  penicillins (Swelling)  penicillin (Unknown)  latex (Rash)      MEDICATIONS   acetaminophen     Tablet .. 650 milliGRAM(s) Oral every 6 hours PRN  amLODIPine   Tablet 5 milliGRAM(s) Oral daily  aspirin  chewable 81 milliGRAM(s) Oral daily  atorvastatin 40 milliGRAM(s) Oral at bedtime  chlorhexidine 2% Cloths 1 Application(s) Topical <User Schedule>  dextrose 50% Injectable 25 Gram(s) IV Push once  dextrose 50% Injectable 12.5 Gram(s) IV Push once  enoxaparin Injectable 40 milliGRAM(s) SubCutaneous every 24 hours  furosemide    Tablet 40 milliGRAM(s) Oral daily  insulin lispro (ADMELOG) corrective regimen sliding scale   SubCutaneous every 6 hours  metoprolol succinate ER 50 milliGRAM(s) Oral daily  polyethylene glycol 3350 17 Gram(s) Oral daily  senna 2 Tablet(s) Oral at bedtime      ----------------------------------------------------------------------------------------  PHYSICAL EXAM  Constitutional - NAD, Comfortable  HEENT - NCAT, EOMI  Neck - Supple, No limited ROM  Chest - Breathing comfortably, No wheezing  Abdomen - Soft   Extremities - No C/C/E, No calf tenderness   Neurologic Exam -                    Cognitive - Awake, Alert, AAO to self and in a hospital but not to date, specific location, or situation     Communication - Fluent, intermittent mild dysarthria that fluctuates, fluent aphasia present     Cranial Nerves - CN 2-12 intact     Motor - No focal deficits                    LEFT    UE - ShAB 4/5, EF 4/5, EE 4/5, WE 4/5,  4/5                    RIGHT UE - ShAB 3/5, EF 3/5, EE 3/5, WE 3/5,  3/5                    LEFT    LE - HF 4/5, KE 4/5, DF 4/5, PF 4/5                    RIGHT LE - HF 4/5, KE 4/5, DF 4/5, PF 4/5       Sensory - Intact to LT     Reflexes - DTR Intact, No primitive reflexive     Coordination - FTN intact     OculoVestibular - No saccades, No nystagmus, VOR      Psychiatric - Mood stable, Affect WNL  ----------------------------------------------------------------------------------------

## 2024-05-03 NOTE — DIETITIAN INITIAL EVALUATION ADULT - ADD RECOMMEND
1) Continue diet as tolerated.   2) Encourage po intake, monitor diet tolerance, and provide assistance at meals as needed.   3) Rx: MVI daily.   4) Obtain daily weights to monitor trends.

## 2024-05-03 NOTE — CHART NOTE - NSCHARTNOTEFT_GEN_A_CORE
To whom it may concern                                                                                                                                                                                                                                                         Please excuse the absence of the daughter of Mrs Medina from any prior commitments. Her mother was admitted here at [facility] from 5/1 - until present day for a serious medical condition and she will likely need rehabilitation for several weeks. Her daughter will need to be present and available for her mother and please call Dr Sam Thompson at [phone] with any questions. Thank you for your consideration in this matter.         Dr Sam Thompson M.D   lic #[id number]

## 2024-05-03 NOTE — PROGRESS NOTE ADULT - ASSESSMENT
89F with PMH afib (informed by her cardiologist that she should take eliquis but refused as per daughter), HF, HTN who presents with stroke. Patient's last known well 8:30 am, found down by  this morning with R sided weakness and unable to speak.  She was taken to [facility], code stroke called. CTH negative, CTA showed LM2 occlusion. Patient received TNK. She was then transferred to [facility] for mechanical thrombectomy. During angiogram, she was found to have a distal LM4 occlusion, so no thrombectomy was performed. She was extubated and admitted to NSICU for post TNK care. CTH post TNK stable with hypodensity involving the the inferior left frontal lobe and left insular ribbon suggesting an acute infarction. Patient is downgraded to medicine pm 5/2 and neuro and pmr consulted    #cva- nsicu note appreciated  - neuro consult appreciaed  - asa and high dose statin   - tte and mri pending  - pmr recommedns AR    #afib- no ac for now  - only when cleared by neuro     #hypertensive urgency - will order renal artery duplex  - improved    #anemia - iron stuides appreciated  - add po iron     #diet - reg diet    dispo - pmr consult appreciated  spoke to pmr, add namenda qhs  dispo - claire middleton on monday

## 2024-05-03 NOTE — DIETITIAN INITIAL EVALUATION ADULT - PERTINENT LABORATORY DATA
05-03 Na139 mmol/L Glu 126 mg/dL<H> K+ 3.9 mmol/L Cr  0.96 mg/dL BUN 21.0 mg/dL<H>     POCT Blood Glucose.: 108 mg/dL (05-03-24 @ 12:13)  A1C with Estimated Average Glucose Result: 5.5 % (05-02-24 @ 03:30)  A1C with Estimated Average Glucose Result: 6.5 % (08-09-23 @ 05:50)

## 2024-05-04 LAB
ALBUMIN SERPL ELPH-MCNC: 3.8 G/DL — SIGNIFICANT CHANGE UP (ref 3.3–5.2)
ALP SERPL-CCNC: 82 U/L — SIGNIFICANT CHANGE UP (ref 40–120)
ALT FLD-CCNC: 17 U/L — SIGNIFICANT CHANGE UP
ANION GAP SERPL CALC-SCNC: 15 MMOL/L — SIGNIFICANT CHANGE UP (ref 5–17)
AST SERPL-CCNC: 25 U/L — SIGNIFICANT CHANGE UP
BASOPHILS # BLD AUTO: 0.02 K/UL — SIGNIFICANT CHANGE UP (ref 0–0.2)
BASOPHILS NFR BLD AUTO: 0.3 % — SIGNIFICANT CHANGE UP (ref 0–2)
BILIRUB SERPL-MCNC: 0.9 MG/DL — SIGNIFICANT CHANGE UP (ref 0.4–2)
BUN SERPL-MCNC: 25.2 MG/DL — HIGH (ref 8–20)
CALCIUM SERPL-MCNC: 8.5 MG/DL — SIGNIFICANT CHANGE UP (ref 8.4–10.5)
CHLORIDE SERPL-SCNC: 103 MMOL/L — SIGNIFICANT CHANGE UP (ref 96–108)
CO2 SERPL-SCNC: 21 MMOL/L — LOW (ref 22–29)
CREAT SERPL-MCNC: 0.9 MG/DL — SIGNIFICANT CHANGE UP (ref 0.5–1.3)
EGFR: 61 ML/MIN/1.73M2 — SIGNIFICANT CHANGE UP
EOSINOPHIL # BLD AUTO: 0.16 K/UL — SIGNIFICANT CHANGE UP (ref 0–0.5)
EOSINOPHIL NFR BLD AUTO: 2.5 % — SIGNIFICANT CHANGE UP (ref 0–6)
GLUCOSE BLDC GLUCOMTR-MCNC: 116 MG/DL — HIGH (ref 70–99)
GLUCOSE BLDC GLUCOMTR-MCNC: 120 MG/DL — HIGH (ref 70–99)
GLUCOSE SERPL-MCNC: 126 MG/DL — HIGH (ref 70–99)
HCT VFR BLD CALC: 37.3 % — SIGNIFICANT CHANGE UP (ref 34.5–45)
HGB BLD-MCNC: 12.3 G/DL — SIGNIFICANT CHANGE UP (ref 11.5–15.5)
IMM GRANULOCYTES NFR BLD AUTO: 0.5 % — SIGNIFICANT CHANGE UP (ref 0–0.9)
LYMPHOCYTES # BLD AUTO: 0.62 K/UL — LOW (ref 1–3.3)
LYMPHOCYTES # BLD AUTO: 9.6 % — LOW (ref 13–44)
MAGNESIUM SERPL-MCNC: 1.9 MG/DL — SIGNIFICANT CHANGE UP (ref 1.6–2.6)
MCHC RBC-ENTMCNC: 29.4 PG — SIGNIFICANT CHANGE UP (ref 27–34)
MCHC RBC-ENTMCNC: 33 GM/DL — SIGNIFICANT CHANGE UP (ref 32–36)
MCV RBC AUTO: 89.2 FL — SIGNIFICANT CHANGE UP (ref 80–100)
MONOCYTES # BLD AUTO: 0.79 K/UL — SIGNIFICANT CHANGE UP (ref 0–0.9)
MONOCYTES NFR BLD AUTO: 12.2 % — SIGNIFICANT CHANGE UP (ref 2–14)
NEUTROPHILS # BLD AUTO: 4.85 K/UL — SIGNIFICANT CHANGE UP (ref 1.8–7.4)
NEUTROPHILS NFR BLD AUTO: 74.9 % — SIGNIFICANT CHANGE UP (ref 43–77)
PLATELET # BLD AUTO: 119 K/UL — LOW (ref 150–400)
POTASSIUM SERPL-MCNC: 4.1 MMOL/L — SIGNIFICANT CHANGE UP (ref 3.5–5.3)
POTASSIUM SERPL-SCNC: 4.1 MMOL/L — SIGNIFICANT CHANGE UP (ref 3.5–5.3)
PROT SERPL-MCNC: 6.5 G/DL — LOW (ref 6.6–8.7)
RBC # BLD: 4.18 M/UL — SIGNIFICANT CHANGE UP (ref 3.8–5.2)
RBC # FLD: 14.4 % — SIGNIFICANT CHANGE UP (ref 10.3–14.5)
SODIUM SERPL-SCNC: 139 MMOL/L — SIGNIFICANT CHANGE UP (ref 135–145)
WBC # BLD: 6.47 K/UL — SIGNIFICANT CHANGE UP (ref 3.8–10.5)
WBC # FLD AUTO: 6.47 K/UL — SIGNIFICANT CHANGE UP (ref 3.8–10.5)

## 2024-05-04 PROCEDURE — 99233 SBSQ HOSP IP/OBS HIGH 50: CPT

## 2024-05-04 PROCEDURE — 70551 MRI BRAIN STEM W/O DYE: CPT | Mod: 26

## 2024-05-04 RX ADMIN — Medication 81 MILLIGRAM(S): at 11:07

## 2024-05-04 RX ADMIN — ATORVASTATIN CALCIUM 40 MILLIGRAM(S): 80 TABLET, FILM COATED ORAL at 21:38

## 2024-05-04 RX ADMIN — Medication 50 MILLIGRAM(S): at 04:35

## 2024-05-04 RX ADMIN — Medication 40 MILLIGRAM(S): at 04:34

## 2024-05-04 RX ADMIN — POLYETHYLENE GLYCOL 3350 17 GRAM(S): 17 POWDER, FOR SOLUTION ORAL at 11:07

## 2024-05-04 RX ADMIN — Medication 325 MILLIGRAM(S): at 11:07

## 2024-05-04 RX ADMIN — AMLODIPINE BESYLATE 5 MILLIGRAM(S): 2.5 TABLET ORAL at 04:35

## 2024-05-04 RX ADMIN — ENOXAPARIN SODIUM 40 MILLIGRAM(S): 100 INJECTION SUBCUTANEOUS at 11:06

## 2024-05-04 RX ADMIN — MEMANTINE HYDROCHLORIDE 5 MILLIGRAM(S): 10 TABLET ORAL at 21:38

## 2024-05-04 RX ADMIN — CHLORHEXIDINE GLUCONATE 1 APPLICATION(S): 213 SOLUTION TOPICAL at 04:35

## 2024-05-04 NOTE — PROGRESS NOTE ADULT - ASSESSMENT
89F with PMH afib (informed by her cardiologist that she should take eliquis but refused as per daughter), HF, HTN who presents with stroke. Patient's last known well 8:30 am, found down by  this morning with R sided weakness and unable to speak.  She was taken to [facility], code stroke called. CTH negative, CTA showed LM2 occlusion. Patient received TNK. She was then transferred to [facility] for mechanical thrombectomy. During angiogram, she was found to have a distal LM4 occlusion, so no thrombectomy was performed. She was extubated and admitted to NSICU for post TNK care. CTH post TNK stable with hypodensity involving the the inferior left frontal lobe and left insular ribbon suggesting an acute infarction. Patient is downgraded to medicine pm 5/2 and neuro and pmr consulted    #Afib not on AC now with new CVA s/p TNK   asa and high dose statin   mri pending  - pmr recommedns AR    #afib- no ac for now per neuro - pending MRI     #hypertensive urgency - will order renal artery duplex  - improved    #anemia - iron stuides appreciated  - add po iron     #diet - reg diet    dispo - pmr consult appreciated  spoke to pmr, add namenda qhs  dispo - claire middleton on monday

## 2024-05-04 NOTE — PROGRESS NOTE ADULT - SUBJECTIVE AND OBJECTIVE BOX
Preliminary note, offical recommendations pending attending review/signature   [facility] Stroke Team  Progress Note     HPI:  89 y/o F with hx of Atrial Fibrillation, not on AC (patient choice), HTN, CHF, Prediabetes, who was transferred from [facility] for LM2 occlusion.  Patient presented yesterday to [facility] ED, LKW at 8:30AM, walked to bathroom, then  heard loud noise and found her with R sided weakness and aphasia.  CTH negative, CTA showed LM2 occlusion. Patient received TNK at 10:33 am and then transferred to [facility] for mechanical thrombectomy. During angiogram, she was found to have a distal LM4 occlusion, so no thrombectomy was performed.  Repeat CT head this AM, stable.      Per family: patient was not on AC by choice--they state she is sensitive to medications so she never wanted to start AC.      SUBJECTIVE: No events overnight.  No new neurologic complaints.  ROS reported negative unless otherwise noted.    acetaminophen     Tablet .. 650 milliGRAM(s) Oral every 6 hours PRN  amLODIPine   Tablet 5 milliGRAM(s) Oral daily  aspirin  chewable 81 milliGRAM(s) Oral daily  atorvastatin 40 milliGRAM(s) Oral at bedtime  chlorhexidine 2% Cloths 1 Application(s) Topical <User Schedule>  dextrose 50% Injectable 25 Gram(s) IV Push once  dextrose 50% Injectable 12.5 Gram(s) IV Push once  enoxaparin Injectable 40 milliGRAM(s) SubCutaneous every 24 hours  ferrous    sulfate 325 milliGRAM(s) Oral daily  furosemide    Tablet 40 milliGRAM(s) Oral daily  insulin lispro (ADMELOG) corrective regimen sliding scale   SubCutaneous every 6 hours  memantine 5 milliGRAM(s) Oral at bedtime  metoprolol succinate ER 50 milliGRAM(s) Oral daily  polyethylene glycol 3350 17 Gram(s) Oral daily  senna 2 Tablet(s) Oral at bedtime      PHYSICAL EXAM:   Vital Signs Last 24 Hrs  T(C): 36.9 (04 May 2024 05:11), Max: 36.9 (03 May 2024 20:00)  T(F): 98.4 (04 May 2024 05:11), Max: 98.5 (03 May 2024 20:00)  HR: 70 (04 May 2024 05:11) (61 - 71)  BP: 150/66 (04 May 2024 05:11) (138/64 - 186/68)  BP(mean): --  RR: 18 (04 May 2024 05:11) (18 - 18)  SpO2: 98% (04 May 2024 05:11) (96% - 98%)    Parameters below as of 04 May 2024 05:11  Patient On (Oxygen Delivery Method): room air    PENDING  General: No acute distress.   Detailed Neurologic Exam:  Mental status: The patient is awake and alert and has normal attention span. She is oriented to [facility], month (year incorrect) and place as [facility]. Difficulty with comprehension, but able to follow most commands and mimics. Difficulty with naming some objects; able to repeat.  Cranial nerves: Pupils equal and react symmetrically to light. There is no visual field deficit to confrontation. Extraocular motion is full with no nystagmus. There is no ptosis. Facial sensation is intact. Facial musculature is symmetric. Palate elevates symmetrically. Tongue is midline.  Motor: There is normal bulk and tone.  There is no tremor.  Strength is 5-/5 in the right arm, with mild pronator drift and hand  is 4/5, RLE is 5/5  Strength is 5/5 in the left arm and leg.  Sensation: Intact to light touch and pin in 4 extremities  Cerebellar: There is no dysmetria on finger to nose testing.  Gait : deferred     LABS:                        12.3   6.47  )-----------( 119      ( 04 May 2024 05:10 )             37.3    05-04    139  |  103  |  25.2<H>  ----------------------------<  126<H>  4.1   |  21.0<L>  |  0.90    Ca    8.5      04 May 2024 05:10  Mg     1.9     05-04    TPro  6.5<L>  /  Alb  3.8  /  TBili  0.9  /  DBili  x   /  AST  25  /  ALT  17  /  AlkPhos  82  05-04    05-02 Chol 154 LDL 96 HDL 40<L> Trig 90    A1C: 5.5%    IMAGING: Reviewed by me.  CTA head and Neck and CTP 5/1/24  CT angiogram neck:  -No hemodynamically significant stenosis.    CT angiogram head:  -Occlusion of a left MCA proximal M2 branch.    CT perfusion:  -49 mL penumbra identified in the left MCA territory.  -No completed core infarct identified by perfusion analysis, although   this is likely underestimated as suspected infarct is seen on noncontrast   CT in the left anterior insula.      CT Brain Stroke Protocol (05.01.24 @ 09:58)   IMPRESSION:  Suspected infarct involving the left anterior insula.    No acute intracranial hemorrhage.    CT Head No Cont (05.02.24 @ 10:16)   Stable hypodensity involving the the inferior left frontal   lobe and left insular ribbon suggesting an acute infarction. No acute   intracranial hemorrhage. Consider MRI for further evaluation.     Preliminary note, offical recommendations pending attending review/signature   [facility] Stroke Team  Progress Note     HPI:  89 y/o F with hx of Atrial Fibrillation, not on AC (patient choice), HTN, CHF, Prediabetes, who was transferred from [facility] for LM2 occlusion.  Patient presented yesterday to [facility] ED, LKW at 8:30AM, walked to bathroom, then  heard loud noise and found her with R sided weakness and aphasia.  CTH negative, CTA showed LM2 occlusion. Patient received TNK at 10:33 am and then transferred to [facility] for mechanical thrombectomy. During angiogram, she was found to have a distal LM4 occlusion, so no thrombectomy was performed.  Repeat CT head this AM, stable.      Per family: patient was not on AC by choice--they state she is sensitive to medications so she never wanted to start AC.      SUBJECTIVE: No events overnight.  No new neurologic complaints.  ROS reported negative unless otherwise noted.    acetaminophen     Tablet .. 650 milliGRAM(s) Oral every 6 hours PRN  amLODIPine   Tablet 5 milliGRAM(s) Oral daily  aspirin  chewable 81 milliGRAM(s) Oral daily  atorvastatin 40 milliGRAM(s) Oral at bedtime  chlorhexidine 2% Cloths 1 Application(s) Topical <User Schedule>  dextrose 50% Injectable 25 Gram(s) IV Push once  dextrose 50% Injectable 12.5 Gram(s) IV Push once  enoxaparin Injectable 40 milliGRAM(s) SubCutaneous every 24 hours  ferrous    sulfate 325 milliGRAM(s) Oral daily  furosemide    Tablet 40 milliGRAM(s) Oral daily  insulin lispro (ADMELOG) corrective regimen sliding scale   SubCutaneous every 6 hours  memantine 5 milliGRAM(s) Oral at bedtime  metoprolol succinate ER 50 milliGRAM(s) Oral daily  polyethylene glycol 3350 17 Gram(s) Oral daily  senna 2 Tablet(s) Oral at bedtime      PHYSICAL EXAM:   Vital Signs Last 24 Hrs  T(C): 36.9 (04 May 2024 05:11), Max: 36.9 (03 May 2024 20:00)  T(F): 98.4 (04 May 2024 05:11), Max: 98.5 (03 May 2024 20:00)  HR: 70 (04 May 2024 05:11) (61 - 71)  BP: 150/66 (04 May 2024 05:11) (138/64 - 186/68)  BP(mean): --  RR: 18 (04 May 2024 05:11) (18 - 18)  SpO2: 98% (04 May 2024 05:11) (96% - 98%)    Parameters below as of 04 May 2024 05:11  Patient On (Oxygen Delivery Method): room air    General: No acute distress.   Detailed Neurologic Exam:  Mental status: The patient is awake and alert and has normal attention span. She is oriented to name, month, and year. Able to name objects and function. Able to name color. Able to repeat and follow simple commands. Cannot follow two step commands. Mild comprehension disrupted.   Cranial nerves: Pupils equal and react symmetrically to light. There is no visual field deficit to confrontation. Extraocular motion is full with no nystagmus. There is no ptosis. Facial sensation is intact. Facial musculature is symmetric. Palate elevates symmetrically. Tongue is midline.  Motor: There is normal bulk and tone.  There is no tremor.  Strength is 5-/5 in the right arm, with mild pronator drift and hand  is 4/5, RLE is 5/5  Strength is 5/5 in the left arm and leg.  Sensation: Intact to light touch and pin in 4 extremities  Cerebellar: There is no dysmetria on finger to nose testing.  Gait : deferred     LABS:                        12.3   6.47  )-----------( 119      ( 04 May 2024 05:10 )             37.3    05-04    139  |  103  |  25.2<H>  ----------------------------<  126<H>  4.1   |  21.0<L>  |  0.90    Ca    8.5      04 May 2024 05:10  Mg     1.9     05-04    TPro  6.5<L>  /  Alb  3.8  /  TBili  0.9  /  DBili  x   /  AST  25  /  ALT  17  /  AlkPhos  82  05-04    05-02 Chol 154 LDL 96 HDL 40<L> Trig 90    A1C: 5.5%    IMAGING: Reviewed by me.  CTA head and Neck and CTP 5/1/24  CT angiogram neck:  -No hemodynamically significant stenosis.    CT angiogram head:  -Occlusion of a left MCA proximal M2 branch.    CT perfusion:  -49 mL penumbra identified in the left MCA territory.  -No completed core infarct identified by perfusion analysis, although   this is likely underestimated as suspected infarct is seen on noncontrast   CT in the left anterior insula.      CT Brain Stroke Protocol (05.01.24 @ 09:58)   IMPRESSION:  Suspected infarct involving the left anterior insula.    No acute intracranial hemorrhage.    CT Head No Cont (05.02.24 @ 10:16)   Stable hypodensity involving the the inferior left frontal   lobe and left insular ribbon suggesting an acute infarction. No acute   intracranial hemorrhage. Consider MRI for further evaluation.     Preliminary note, offical recommendations pending attending review/signature   [facility] Stroke Team  Progress Note     HPI:  89 y/o F with hx of Atrial Fibrillation, not on AC (patient choice), HTN, CHF, Prediabetes, who was transferred from [facility] for LM2 occlusion.  Patient presented yesterday to [facility] ED, LKW at 8:30AM, walked to bathroom, then  heard loud noise and found her with R sided weakness and aphasia.  CTH negative, CTA showed LM2 occlusion. Patient received TNK at 10:33 am and then transferred to [facility] for mechanical thrombectomy. During angiogram, she was found to have a distal LM4 occlusion, so no thrombectomy was performed.  Repeat CT head this AM, stable.      Per family: patient was not on AC by choice--they state she is sensitive to medications so she never wanted to start AC.      SUBJECTIVE: No events overnight.  No new neurologic complaints.  ROS reported negative unless otherwise noted.    acetaminophen     Tablet .. 650 milliGRAM(s) Oral every 6 hours PRN  amLODIPine   Tablet 5 milliGRAM(s) Oral daily  aspirin  chewable 81 milliGRAM(s) Oral daily  atorvastatin 40 milliGRAM(s) Oral at bedtime  chlorhexidine 2% Cloths 1 Application(s) Topical <User Schedule>  dextrose 50% Injectable 25 Gram(s) IV Push once  dextrose 50% Injectable 12.5 Gram(s) IV Push once  enoxaparin Injectable 40 milliGRAM(s) SubCutaneous every 24 hours  ferrous    sulfate 325 milliGRAM(s) Oral daily  furosemide    Tablet 40 milliGRAM(s) Oral daily  insulin lispro (ADMELOG) corrective regimen sliding scale   SubCutaneous every 6 hours  memantine 5 milliGRAM(s) Oral at bedtime  metoprolol succinate ER 50 milliGRAM(s) Oral daily  polyethylene glycol 3350 17 Gram(s) Oral daily  senna 2 Tablet(s) Oral at bedtime      PHYSICAL EXAM:   Vital Signs Last 24 Hrs  T(C): 36.9 (04 May 2024 05:11), Max: 36.9 (03 May 2024 20:00)  T(F): 98.4 (04 May 2024 05:11), Max: 98.5 (03 May 2024 20:00)  HR: 70 (04 May 2024 05:11) (61 - 71)  BP: 150/66 (04 May 2024 05:11) (138/64 - 186/68)  BP(mean): --  RR: 18 (04 May 2024 05:11) (18 - 18)  SpO2: 98% (04 May 2024 05:11) (96% - 98%)    Parameters below as of 04 May 2024 05:11  Patient On (Oxygen Delivery Method): room air    General: No acute distress.   Detailed Neurologic Exam:  Mental status: The patient is awake and alert and has normal attention span. She is oriented to name, month, and year. Able to name objects and function. Able to name color. Able to repeat and follow simple commands. Cannot follow two step commands. Mild comprehension disrupted.   Cranial nerves: Pupils equal and react symmetrically to light. Right temporal inferior quadrantopia.  Extraocular motion is full with no nystagmus. There is no ptosis. Facial sensation is intact. Facial musculature is symmetric. Palate elevates symmetrically. Tongue is midline.  Motor: There is normal bulk and tone.  There is no tremor.  Strength is 5-/5 in the right arm, with mild pronator drift and hand  is 4/5, RLE is 5/5  Strength is 5/5 in the left arm and leg.  Sensation: Intact to light touch and pin in 4 extremities  Cerebellar: There is no dysmetria on finger to nose testing.  Gait : deferred     LABS:                        12.3   6.47  )-----------( 119      ( 04 May 2024 05:10 )             37.3    05-04    139  |  103  |  25.2<H>  ----------------------------<  126<H>  4.1   |  21.0<L>  |  0.90    Ca    8.5      04 May 2024 05:10  Mg     1.9     05-04    TPro  6.5<L>  /  Alb  3.8  /  TBili  0.9  /  DBili  x   /  AST  25  /  ALT  17  /  AlkPhos  82  05-04    05-02 Chol 154 LDL 96 HDL 40<L> Trig 90    A1C: 5.5%    IMAGING: Reviewed by me.  CTA head and Neck and CTP 5/1/24  CT angiogram neck:  -No hemodynamically significant stenosis.    CT angiogram head:  -Occlusion of a left MCA proximal M2 branch.    CT perfusion:  -49 mL penumbra identified in the left MCA territory.  -No completed core infarct identified by perfusion analysis, although   this is likely underestimated as suspected infarct is seen on noncontrast   CT in the left anterior insula.      CT Brain Stroke Protocol (05.01.24 @ 09:58)   IMPRESSION:  Suspected infarct involving the left anterior insula.    No acute intracranial hemorrhage.    CT Head No Cont (05.02.24 @ 10:16)   Stable hypodensity involving the the inferior left frontal   lobe and left insular ribbon suggesting an acute infarction. No acute   intracranial hemorrhage. Consider MRI for further evaluation.       [facility] Stroke Team  Progress Note     HPI:  89 y/o F with hx of Atrial Fibrillation, not on AC (patient choice), HTN, CHF, Prediabetes, who was transferred from [facility] for LM2 occlusion.  Patient presented yesterday to [facility] ED, LKW at 8:30AM, walked to bathroom, then  heard loud noise and found her with R sided weakness and aphasia.  CTH negative, CTA showed LM2 occlusion. Patient received TNK at 10:33 am and then transferred to [facility] for mechanical thrombectomy. During angiogram, she was found to have a distal LM4 occlusion, so no thrombectomy was performed.  Repeat CT head this AM, stable.      Per family: patient was not on AC by choice--they state she is sensitive to medications so she never wanted to start AC.      SUBJECTIVE: No events overnight.  No new neurologic complaints.  ROS reported negative unless otherwise noted.    acetaminophen     Tablet .. 650 milliGRAM(s) Oral every 6 hours PRN  amLODIPine   Tablet 5 milliGRAM(s) Oral daily  aspirin  chewable 81 milliGRAM(s) Oral daily  atorvastatin 40 milliGRAM(s) Oral at bedtime  chlorhexidine 2% Cloths 1 Application(s) Topical <User Schedule>  dextrose 50% Injectable 25 Gram(s) IV Push once  dextrose 50% Injectable 12.5 Gram(s) IV Push once  enoxaparin Injectable 40 milliGRAM(s) SubCutaneous every 24 hours  ferrous    sulfate 325 milliGRAM(s) Oral daily  furosemide    Tablet 40 milliGRAM(s) Oral daily  insulin lispro (ADMELOG) corrective regimen sliding scale   SubCutaneous every 6 hours  memantine 5 milliGRAM(s) Oral at bedtime  metoprolol succinate ER 50 milliGRAM(s) Oral daily  polyethylene glycol 3350 17 Gram(s) Oral daily  senna 2 Tablet(s) Oral at bedtime      PHYSICAL EXAM:   Vital Signs Last 24 Hrs  T(C): 36.9 (04 May 2024 05:11), Max: 36.9 (03 May 2024 20:00)  T(F): 98.4 (04 May 2024 05:11), Max: 98.5 (03 May 2024 20:00)  HR: 70 (04 May 2024 05:11) (61 - 71)  BP: 150/66 (04 May 2024 05:11) (138/64 - 186/68)  BP(mean): --  RR: 18 (04 May 2024 05:11) (18 - 18)  SpO2: 98% (04 May 2024 05:11) (96% - 98%)    Parameters below as of 04 May 2024 05:11  Patient On (Oxygen Delivery Method): room air    General: No acute distress.   Detailed Neurologic Exam:  Mental status: The patient is awake and alert and has normal attention span. She is oriented to name, month, and year. Able to name objects and function. Able to name color. Able to repeat and follow simple commands. Cannot follow two step commands. Mild comprehension disrupted.   Cranial nerves: Pupils equal and react symmetrically to light. Right temporal inferior quadrantopia.  Extraocular motion is full with no nystagmus. There is no ptosis. Facial sensation is intact. Facial musculature is symmetric. Palate elevates symmetrically. Tongue is midline.  Motor: There is normal bulk and tone.  There is no tremor.  Strength is 5-/5 in the right arm, with mild pronator drift and hand  is 4/5, RLE is 5/5  Strength is 5/5 in the left arm and leg.  Sensation: Intact to light touch and pin in 4 extremities  Cerebellar: There is no dysmetria on finger to nose testing.  Gait : deferred     LABS:                        12.3   6.47  )-----------( 119      ( 04 May 2024 05:10 )             37.3    05-04    139  |  103  |  25.2<H>  ----------------------------<  126<H>  4.1   |  21.0<L>  |  0.90    Ca    8.5      04 May 2024 05:10  Mg     1.9     05-04    TPro  6.5<L>  /  Alb  3.8  /  TBili  0.9  /  DBili  x   /  AST  25  /  ALT  17  /  AlkPhos  82  05-04    05-02 Chol 154 LDL 96 HDL 40<L> Trig 90    A1C: 5.5%    IMAGING: Reviewed by me.  CTA head and Neck and CTP 5/1/24  CT angiogram neck:  -No hemodynamically significant stenosis.    CT angiogram head:  -Occlusion of a left MCA proximal M2 branch.    CT perfusion:  -49 mL penumbra identified in the left MCA territory.  -No completed core infarct identified by perfusion analysis, although   this is likely underestimated as suspected infarct is seen on noncontrast   CT in the left anterior insula.      CT Brain Stroke Protocol (05.01.24 @ 09:58)   IMPRESSION:  Suspected infarct involving the left anterior insula.    No acute intracranial hemorrhage.    CT Head No Cont (05.02.24 @ 10:16)   Stable hypodensity involving the the inferior left frontal   lobe and left insular ribbon suggesting an acute infarction. No acute   intracranial hemorrhage. Consider MRI for further evaluation.

## 2024-05-04 NOTE — PROGRESS NOTE ADULT - SUBJECTIVE AND OBJECTIVE BOX
LAURI MENDEZ Patient is a 89y old  Female who presents with a chief complaint of stroke (04 May 2024 08:13)     HPI:  89F with PMH afib, HF, HTN who presents with stroke. Patient's last known well 8:30 am, found down by  this morning with R sided weakness and unable to speak.  She was taken to [facility], code stroke called. CTH negative, CTA showed LM2 occlusion. Patient received TNK at 10:33 am. She was then transferred to [facility] for mechanical thrombectomy. During angiogram, she was found to have a distal LM4 occlusion, so no thrombectomy was performed. She was extubated and admitted to NSICU for post TNK care.         The patient was seen and evaluated sitting in chair with  and daughter at bedside   The patient is in no acute distress.  Denied any fever chest pain, palpitations, shortness of breath, abdominal pain, fever, dysuria, cough, edema       I&O's Summary    03 May 2024 07:01  -  04 May 2024 07:00  --------------------------------------------------------  IN: 520 mL / OUT: 800 mL / NET: -280 mL    04 May 2024 07:01  -  04 May 2024 17:48  --------------------------------------------------------  IN: 0 mL / OUT: 1100 mL / NET: -1100 mL      Allergies    penicillins (Swelling)  penicillin (Unknown)  latex (Rash)    Intolerances      HEALTH ISSUES - PROBLEM Dx:        PAST MEDICAL & SURGICAL HISTORY:  H/O pleural effusion      HTN (hypertension)      Hyperlipidemia      Prediabetes      Basal cell carcinoma  and squamous cell carcinoma removed      Atrial fibrillation      History of total right hip replacement  left 2015              Vital Signs Last 24 Hrs  T(C): 36.6 (04 May 2024 16:31), Max: 36.9 (03 May 2024 20:00)  T(F): 97.8 (04 May 2024 16:31), Max: 98.5 (03 May 2024 20:00)  HR: 67 (04 May 2024 16:31) (61 - 71)  BP: 124/71 (04 May 2024 16:31) (124/64 - 186/68)  BP(mean): --  RR: 18 (04 May 2024 16:31) (17 - 18)  SpO2: 95% (04 May 2024 16:31) (95% - 98%)    Parameters below as of 04 May 2024 16:31  Patient On (Oxygen Delivery Method): room air    T(C): 36.6 (05-04-24 @ 16:31), Max: 36.9 (05-03-24 @ 20:00)  HR: 67 (05-04-24 @ 16:31) (61 - 71)  BP: 124/71 (05-04-24 @ 16:31) (124/64 - 186/68)  RR: 18 (05-04-24 @ 16:31) (17 - 18)  SpO2: 95% (05-04-24 @ 16:31) (95% - 98%)  Wt(kg): --    PHYSICAL EXAM:    GENERAL: NAD  HEAD:  Atraumatic, Normocephalic  EYES: EOMI, PERRL, conjunctiva and sclera clear  ENMT:  Moist mucous membranes,  NERVOUS SYSTEM:  Alert & Oriented X3,  Moves upper and lower extremities; CNS-II-XII  CHEST/LUNG: Clear to auscultation bilaterally; No rales, rhonchi, wheezing,   HEART: Regular rate and rhythm; No murmurs,   ABDOMEN: Soft, Nontender, Nondistended; Bowel sounds present  EXTREMITIES:  Peripheral Pulses,   psychiatry- mood and affect appropriate, Insight and judgement intact     acetaminophen     Tablet .. 650 milliGRAM(s) Oral every 6 hours PRN  amLODIPine   Tablet 5 milliGRAM(s) Oral daily  aspirin  chewable 81 milliGRAM(s) Oral daily  atorvastatin 40 milliGRAM(s) Oral at bedtime  chlorhexidine 2% Cloths 1 Application(s) Topical <User Schedule>  dextrose 50% Injectable 25 Gram(s) IV Push once  dextrose 50% Injectable 12.5 Gram(s) IV Push once  enoxaparin Injectable 40 milliGRAM(s) SubCutaneous every 24 hours  ferrous    sulfate 325 milliGRAM(s) Oral daily  furosemide    Tablet 40 milliGRAM(s) Oral daily  insulin lispro (ADMELOG) corrective regimen sliding scale   SubCutaneous every 6 hours  memantine 5 milliGRAM(s) Oral at bedtime  metoprolol succinate ER 50 milliGRAM(s) Oral daily  polyethylene glycol 3350 17 Gram(s) Oral daily  senna 2 Tablet(s) Oral at bedtime      LABS:                          12.3   6.47  )-----------( 119      ( 04 May 2024 05:10 )             37.3     05-04    139  |  103  |  25.2<H>  ----------------------------<  126<H>  4.1   |  21.0<L>  |  0.90    Ca    8.5      04 May 2024 05:10  Mg     1.9     05-04    TPro  6.5<L>  /  Alb  3.8  /  TBili  0.9  /  DBili  x   /  AST  25  /  ALT  17  /  AlkPhos  82  05-04    LIVER FUNCTIONS - ( 04 May 2024 05:10 )  Alb: 3.8 g/dL / Pro: 6.5 g/dL / ALK PHOS: 82 U/L / ALT: 17 U/L / AST: 25 U/L / GGT: x                 Urinalysis Basic - ( 04 May 2024 05:10 )    Color: x / Appearance: x / SG: x / pH: x  Gluc: 126 mg/dL / Ketone: x  / Bili: x / Urobili: x   Blood: x / Protein: x / Nitrite: x   Leuk Esterase: x / RBC: x / WBC x   Sq Epi: x / Non Sq Epi: x / Bacteria: x      CAPILLARY BLOOD GLUCOSE      POCT Blood Glucose.: 116 mg/dL (04 May 2024 12:31)      RADIOLOGY & ADDITIONAL TESTS:      Consultant notes reviewed    Case discussed with consultant/provider/ family /patient

## 2024-05-04 NOTE — PROGRESS NOTE ADULT - ASSESSMENT
INCOMPLETE  ASSESSMENT: 89 y/o F with hx of Atrial Fibrillation, not on AC (patient choice), HTN, CHF, Prediabetes, who was transferred from [facility] for LM2 occlusion.  Patient presented yesterday to [facility] ED, LKW at 8:30AM, walked to bathroom, then  heard loud noise and found her with R sided weakness and aphasia.  CTH negative, CTA showed LM2 occlusion. Patient received TNK at 10:33 am and then transferred to [facility] for mechanical thrombectomy. During angiogram, she was found to have a distal LM4 occlusion, so no thrombectomy was performed.    Neurologically with aphasia, improving.     NEURO:   -Continue close monitoring for neurologic deterioration    -Stroke neuro checks q 4h   -SBP goal normotension  -ANTITHROMBOTIC THERAPY: ASA 81mg for now. Will need to consider anticoagulation pending MRI Brain and anemia w/u  -titrate statin to LDL goal less than 70 - on atorvastatin 40 mg daily  -MRI Brain w/o pending  -Dysphagia screen: pass   -Physical therapy/OT/Speech eval/treatment.     -CARDIOVASCULAR: check TTE, cardiac monitoring w/ telemetry for now, further evaluation pending findings of noted workup                              -HEMATOLOGY: patient should have all age and risk appropriate malignancy screenings with PCP or sooner if clinically suspected   -Anemia workup per primary team      DVT ppx: Heparin s.c [] LMWH [x]     RENAL:  maintain adequate hydration       Na Goal:  135-145     Mott: N    ID: monitor for si/sx of infection     OTHER:  condition and plan of care d/w patient and  at bedside, questions and concerns addressed.     DISPOSITION: Rehab or home depending on PT eval once stable and workup is complete    CORE MEASURES:        Admission NIHSS: 6     Tenecteplase : [x] YES [] NO      LDL/HDL/A1C: 96/40/5.5     Depression Screen- if depression hx and/or present      Statin Therapy: Atorvastatin     Dysphagia Screen: [x] PASS [] FAIL     Smoking [] YES [x] NO      Afib [x] YES [] NO     Stroke Education [] YES [] NO Pending    Obtain screening lower extremity venous ultrasound in patients who meet 1 or more of the following criteria as patient is high risk for DVT/PE on admission:   [] History of DVT/PE  []Hypercoagulable states (Factor V Leiden, Cancer, OCP, etc. )  []Prolonged immobility (hemiplegia/hemiparesis/post operative or any other extended immobilization)  [] Transferred from outside facility (Rehab or Long term care)  [] Age </= to 50 ASSESSMENT: 89 y/o F with hx of Atrial Fibrillation, not on AC (patient choice), HTN, CHF, Prediabetes, who was transferred from [facility] for LM2 occlusion.  Patient presented yesterday to [facility] ED, LKW at 8:30AM, walked to bathroom, then  heard loud noise and found her with R sided weakness and aphasia.  CTH negative, CTA showed LM2 occlusion. Patient received TNK at 10:33 am and then transferred to [facility] for mechanical thrombectomy. During angiogram, she was found to have a distal LM4 occlusion, so no thrombectomy was performed.]    Etiology likely cardioembolic in setting of hx of atrial fibrillation and not on anticoagulation Will need MRI brain w/out for further evaluation.     NEURO:   -Continue close monitoring for neurologic deterioration  -Neurologically with improvement. Still with mild aphasia and right pronator drift   -Stroke neuro checks q 4h   -SBP goal normotension  -ANTITHROMBOTIC THERAPY: ASA 81mg for now. Timeline for anticoagulation will be based on MRI brain and anemia workup.    -titrate statin to LDL goal less than 70 - on atorvastatin 40 mg daily  -MRI Brain w/o pending  -Dysphagia screen: pass   -Physical therapy/OT/Speech eval/treatment.     -CARDIOVASCULAR: check TTE, cardiac monitoring w/ telemetry for now, further evaluation pending findings of noted workup                              -HEMATOLOGY: patient should have all age and risk appropriate malignancy screenings with PCP or sooner if clinically suspected   -Anemia workup per primary team      DVT ppx: Heparin s.c [] LMWH [x]     RENAL:  maintain adequate hydration       Na Goal:  135-145     Mott: N    ID: monitor for si/sx of infection     OTHER:  condition and plan of care d/w patient and family at bedside, questions and concerns addressed.     -Further care per primary team.     DISPOSITION: Rehab or home depending on PT eval once stable and workup is complete    CORE MEASURES:        Admission NIHSS: 6     Tenecteplase : [x] YES [] NO      LDL/HDL/A1C: 96/40/5.5     Depression Screen- if depression hx and/or present      Statin Therapy: Atorvastatin     Dysphagia Screen: [x] PASS [] FAIL     Smoking [] YES [x] NO      Afib [x] YES [] NO     Stroke Education [] YES [] NO Pending    Obtain screening lower extremity venous ultrasound in patients who meet 1 or more of the following criteria as patient is high risk for DVT/PE on admission:   [] History of DVT/PE  []Hypercoagulable states (Factor V Leiden, Cancer, OCP, etc. )  []Prolonged immobility (hemiplegia/hemiparesis/post operative or any other extended immobilization)  [] Transferred from outside facility (Rehab or Long term care)  [] Age </= to 50 ASSESSMENT: 89 y/o F with hx of Atrial Fibrillation, not on AC (patient choice), HTN, CHF, Prediabetes, who was transferred from [facility] for LM2 occlusion.  Patient presented yesterday to [facility] ED, LKW at 8:30AM, walked to bathroom, then  heard loud noise and found her with R sided weakness and aphasia.  CTH negative, CTA showed LM2 occlusion. Patient received TNK at 10:33 am and then transferred to [facility] for mechanical thrombectomy. During angiogram, she was found to have a distal LM4 occlusion, so no thrombectomy was performed.]    Etiology likely cardioembolic in setting of hx of atrial fibrillation and not on anticoagulation Will need MRI brain w/out for further evaluation.     NEURO:   -Continue close monitoring for neurologic deterioration  -Neurologically with improvement. Still with mild aphasia and right pronator drift. Right inferior temporal quadrantanopia.   -Patient is not allowed to drive. Must see neuro-ophthalmologists and get clearance from the optho standpoint to drive. Would also need neurology clearance after discharge due to RUE drift and weakness.   -Stroke neuro checks q 4h   -SBP goal normotension  -ANTITHROMBOTIC THERAPY: ASA 81mg for now. Timeline for anticoagulation will be based on MRI brain and anemia workup.    -titrate statin to LDL goal less than 70 - on atorvastatin 40 mg daily  -MRI Brain w/o pending  -Dysphagia screen: pass   -Physical therapy/OT/Speech eval/treatment.     -CARDIOVASCULAR: check TTE, cardiac monitoring w/ telemetry for now, further evaluation pending findings of noted workup                              -HEMATOLOGY: patient should have all age and risk appropriate malignancy screenings with PCP or sooner if clinically suspected   -Anemia workup per primary team      DVT ppx: Heparin s.c [] LMWH [x]     RENAL:  maintain adequate hydration       Na Goal:  135-145     Mott: N    ID: monitor for si/sx of infection     OTHER:  condition and plan of care d/w patient and family at bedside, questions and concerns addressed.     -Further care per primary team.     DISPOSITION: Rehab or home depending on PT eval once stable and workup is complete    CORE MEASURES:        Admission NIHSS: 6     Tenecteplase : [x] YES [] NO      LDL/HDL/A1C: 96/40/5.5     Depression Screen- if depression hx and/or present      Statin Therapy: Atorvastatin     Dysphagia Screen: [x] PASS [] FAIL     Smoking [] YES [x] NO      Afib [x] YES [] NO     Stroke Education [] YES [] NO Pending    Obtain screening lower extremity venous ultrasound in patients who meet 1 or more of the following criteria as patient is high risk for DVT/PE on admission:   [] History of DVT/PE  []Hypercoagulable states (Factor V Leiden, Cancer, OCP, etc. )  []Prolonged immobility (hemiplegia/hemiparesis/post operative or any other extended immobilization)  [] Transferred from outside facility (Rehab or Long term care)  [] Age </= to 50

## 2024-05-05 LAB
GLUCOSE BLDC GLUCOMTR-MCNC: 105 MG/DL — HIGH (ref 70–99)
GLUCOSE BLDC GLUCOMTR-MCNC: 111 MG/DL — HIGH (ref 70–99)
GLUCOSE BLDC GLUCOMTR-MCNC: 131 MG/DL — HIGH (ref 70–99)
GLUCOSE BLDC GLUCOMTR-MCNC: 133 MG/DL — HIGH (ref 70–99)
GLUCOSE BLDC GLUCOMTR-MCNC: 144 MG/DL — HIGH (ref 70–99)

## 2024-05-05 PROCEDURE — 99233 SBSQ HOSP IP/OBS HIGH 50: CPT

## 2024-05-05 RX ADMIN — Medication 40 MILLIGRAM(S): at 04:51

## 2024-05-05 RX ADMIN — ENOXAPARIN SODIUM 40 MILLIGRAM(S): 100 INJECTION SUBCUTANEOUS at 12:10

## 2024-05-05 RX ADMIN — ATORVASTATIN CALCIUM 40 MILLIGRAM(S): 80 TABLET, FILM COATED ORAL at 21:50

## 2024-05-05 RX ADMIN — Medication 50 MILLIGRAM(S): at 04:51

## 2024-05-05 RX ADMIN — SENNA PLUS 2 TABLET(S): 8.6 TABLET ORAL at 21:49

## 2024-05-05 RX ADMIN — Medication 81 MILLIGRAM(S): at 12:10

## 2024-05-05 RX ADMIN — CHLORHEXIDINE GLUCONATE 1 APPLICATION(S): 213 SOLUTION TOPICAL at 04:47

## 2024-05-05 RX ADMIN — MEMANTINE HYDROCHLORIDE 5 MILLIGRAM(S): 10 TABLET ORAL at 21:50

## 2024-05-05 RX ADMIN — Medication 325 MILLIGRAM(S): at 12:10

## 2024-05-05 RX ADMIN — AMLODIPINE BESYLATE 5 MILLIGRAM(S): 2.5 TABLET ORAL at 04:51

## 2024-05-05 NOTE — PROGRESS NOTE ADULT - ASSESSMENT
INCOMPLETE  ASSESSMENT: 89 y/o F with hx of Atrial Fibrillation, not on AC (patient choice), HTN, CHF, Prediabetes, who was transferred from [facility] for LM2 occlusion.  Patient presented yesterday to [facility] ED, LKW at 8:30AM, walked to bathroom, then  heard loud noise and found her with R sided weakness and aphasia.  CTH negative, CTA showed LM2 occlusion. Patient received TNK at 10:33 am and then transferred to [facility] for mechanical thrombectomy. During angiogram, she was found to have a distal LM4 occlusion, so no thrombectomy was performed.]    MRI brain w/out revealed patchy acute infarcts in the left cerebral hemisphere. Etiology likely cardioembolic in setting of hx of atrial fibrillation and not on anticoagulation     NEURO:   -Continue close monitoring for neurologic deterioration  -Neurologically with improvement. Still with mild aphasia and right pronator drift. Right inferior temporal quadrantanopia.   -Patient is not allowed to drive. Must see neuro-ophthalmologists and get clearance from the optho standpoint to drive. Would also need neurology clearance after discharge due to RUE drift and weakness.   -Stroke neuro checks q 4h   -SBP goal normotension  -ANTITHROMBOTIC THERAPY: ASA 81mg for now. Timeline for anticoagulation will be based on MRI brain and anemia workup.    -titrate statin to LDL goal less than 70 - on atorvastatin 40 mg daily  -MRI Brain w/o pending  -Dysphagia screen: pass   -Physical therapy/OT/Speech eval/treatment.     -CARDIOVASCULAR: TTE as noted with severely dilated LA. , cardiac monitoring w/ telemetry for now, further evaluation pending findings of noted workup                              -HEMATOLOGY: patient should have all age and risk appropriate malignancy screenings with PCP or sooner if clinically suspected   -Anemia workup per primary team      DVT ppx: Heparin s.c [] LMWH [x]     RENAL:  maintain adequate hydration       Na Goal:  135-145     Mott: N    ID: monitor for si/sx of infection     OTHER:  condition and plan of care d/w patient and family at bedside, questions and concerns addressed.   -Medication compliance strongly advised.  -Strict risk factor modifications    -Further care per primary team.     DISPOSITION: Rehab or home depending on PT eval once stable and workup is complete    CORE MEASURES:        Admission NIHSS: 6     Tenecteplase : [x] YES [] NO      LDL/HDL/A1C: 96/40/5.5     Depression Screen- if depression hx and/or present      Statin Therapy: Atorvastatin     Dysphagia Screen: [x] PASS [] FAIL     Smoking [] YES [x] NO      Afib [x] YES [] NO     Stroke Education [] YES [] NO Pending    Obtain screening lower extremity venous ultrasound in patients who meet 1 or more of the following criteria as patient is high risk for DVT/PE on admission:   [] History of DVT/PE  []Hypercoagulable states (Factor V Leiden, Cancer, OCP, etc. )  []Prolonged immobility (hemiplegia/hemiparesis/post operative or any other extended immobilization)  [] Transferred from outside facility (Rehab or Long term care)  [] Age </= to 50 ASSESSMENT: 89 y/o F with hx of Atrial Fibrillation, not on AC (patient choice), HTN, CHF, Prediabetes, who was transferred from [facility] for LM2 occlusion.  Patient presented yesterday to [facility] ED, LKW at 8:30AM, walked to bathroom, then  heard loud noise and found her with R sided weakness and aphasia.  CTH negative, CTA showed LM2 occlusion. Patient received TNK at 10:33 am and then transferred to [facility] for mechanical thrombectomy. During angiogram, she was found to have a distal LM4 occlusion, so no thrombectomy was performed.]    MRI brain w/out revealed patchy acute infarcts in the left cerebral hemisphere. Etiology likely cardioembolic in setting of hx of atrial fibrillation and not on anticoagulation     NEURO:   -Continue close monitoring for neurologic deterioration  -Neurologically with improvement. Still with mild aphasia and right pronator drift. Right inferior temporal quadrantanopia appeared to be resolved.   -Patient is not allowed to drive. Must see neuro-ophthalmologists and get clearance from the optho standpoint to drive. Would also need neurology clearance after discharge due to RUE drift and weakness.   -Stroke neuro checks q 4h   -SBP goal normotension  -ANTITHROMBOTIC THERAPY: ASA 81mg for now. Patient should have head CT on 5/7. If stable patient can then be placed on Eliquis 5mg BID in setting of afib. At that point ASA 81mg daily is not needed from the neurological standpoint. Patient should monitor weights at home, as if patient gets to weight below 60kg would then consider to lower Eliquis dose.   -titrate statin to LDL goal less than 70 - on atorvastatin 40 mg daily  -MRI Brain w/o pending  -Dysphagia screen: pass   -Physical therapy/OT/Speech eval/treatment.     -CARDIOVASCULAR: TTE as noted with severely dilated LA. 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 If plan for stent placement would need neurological clearance from the stroke standpoint, as well as patient being on Eliquis.   Na Goal:  135-145     Mott: N    ID: monitor for si/sx of infection     OTHER:  condition and plan of care d/w patient and family at bedside, questions and concerns addressed.   -Medication compliance strongly advised.  -Strict risk factor modifications    -Further care per primary team.     DISPOSITION: Rehab or home depending on PT eval once stable and workup is complete    CORE MEASURES:        Admission NIHSS: 6     Tenecteplase : [x] YES [] NO      LDL/HDL/A1C: 96/40/5.5     Depression Screen- if depression hx and/or present      Statin Therapy: Atorvastatin     Dysphagia Screen: [x] PASS [] FAIL     Smoking [] YES [x] NO      Afib [x] YES [] NO     Stroke Education [] YES [] NO Pending    Obtain screening lower extremity venous ultrasound in patients who meet 1 or more of the following criteria as patient is high risk for DVT/PE on admission:   [] History of DVT/PE  []Hypercoagulable states (Factor V Leiden, Cancer, OCP, etc. )  []Prolonged immobility (hemiplegia/hemiparesis/post operative or any other extended immobilization)  [] Transferred from outside facility (Rehab or Long term care)  [] Age </= to 50

## 2024-05-05 NOTE — PROGRESS NOTE ADULT - SUBJECTIVE AND OBJECTIVE BOX
LAURI MENDEZ Patient is a 89y old  Female who presents with a chief complaint of stroke (05 May 2024 08:31)     HPI:  89F with PMH afib, HF, HTN who presents with stroke. Patient's last known well 8:30 am, found down by  this morning with R sided weakness and unable to speak.  She was taken to [facility], code stroke called. CTH negative, CTA showed LM2 occlusion. Patient received TNK at 10:33 am. She was then transferred to [facility] for mechanical thrombectomy. During angiogram, she was found to have a distal LM4 occlusion, so no thrombectomy was performed. She was extubated and admitted to NSICU for post TNK care.       The patient was seen and evaluated sitting in chair - with  at bedside - aware of results of right renal artery stenosis   The patient is in no acute distress.  Denied any fever chest pain, palpitations, shortness of breath, abdominal pain, fever, dysuria, cough, edema       I&O's Summary    04 May 2024 07:01  -  05 May 2024 07:00  --------------------------------------------------------  IN: 400 mL / OUT: 2000 mL / NET: -1600 mL      Allergies    penicillins (Swelling)  penicillin (Unknown)  latex (Rash)    Intolerances      HEALTH ISSUES - PROBLEM Dx:        PAST MEDICAL & SURGICAL HISTORY:  H/O pleural effusion      HTN (hypertension)      Hyperlipidemia      Prediabetes      Basal cell carcinoma  and squamous cell carcinoma removed      Atrial fibrillation      History of total right hip replacement  left 2015              Vital Signs Last 24 Hrs  T(C): 36.7 (05 May 2024 16:36), Max: 36.7 (04 May 2024 21:34)  T(F): 98 (05 May 2024 16:36), Max: 98.1 (05 May 2024 04:42)  HR: 66 (05 May 2024 16:36) (60 - 72)  BP: 127/68 (05 May 2024 16:36) (127/68 - 159/71)  BP(mean): --  RR: 18 (05 May 2024 16:36) (16 - 18)  SpO2: 100% (05 May 2024 16:36) (96% - 100%)    Parameters below as of 05 May 2024 16:36  Patient On (Oxygen Delivery Method): room air    T(C): 36.7 (05-05-24 @ 16:36), Max: 36.7 (05-04-24 @ 21:34)  HR: 66 (05-05-24 @ 16:36) (60 - 72)  BP: 127/68 (05-05-24 @ 16:36) (127/68 - 159/71)  RR: 18 (05-05-24 @ 16:36) (16 - 18)  SpO2: 100% (05-05-24 @ 16:36) (96% - 100%)  Wt(kg): --    PHYSICAL EXAM:    GENERAL: NAD, conversing pleasant sitting in chair   HEAD:  Atraumatic, Normocephalic  EYES: EOMI, PERRL, conjunctiva and sclera clear  ENMT:  Moist mucous membranes,   NECK: Supple,  NERVOUS SYSTEM:  Alert & Oriented X3,  Moves upper and lower extremities; CNS-II-XII  CHEST/LUNG: Clear to auscultation bilaterally;  HEART: Regular rate and rhythm;  ABDOMEN: Soft, Nontender, Nondistended; Bowel sounds present  EXTREMITIES:  Peripheral Pulses,   psychiatry- mood and affect appropriate, Insight and judgement intact     acetaminophen     Tablet .. 650 milliGRAM(s) Oral every 6 hours PRN  amLODIPine   Tablet 5 milliGRAM(s) Oral daily  aspirin  chewable 81 milliGRAM(s) Oral daily  atorvastatin 40 milliGRAM(s) Oral at bedtime  chlorhexidine 2% Cloths 1 Application(s) Topical <User Schedule>  dextrose 50% Injectable 25 Gram(s) IV Push once  dextrose 50% Injectable 12.5 Gram(s) IV Push once  enoxaparin Injectable 40 milliGRAM(s) SubCutaneous every 24 hours  ferrous    sulfate 325 milliGRAM(s) Oral daily  furosemide    Tablet 40 milliGRAM(s) Oral daily  insulin lispro (ADMELOG) corrective regimen sliding scale   SubCutaneous every 6 hours  memantine 5 milliGRAM(s) Oral at bedtime  metoprolol succinate ER 50 milliGRAM(s) Oral daily  polyethylene glycol 3350 17 Gram(s) Oral daily  senna 2 Tablet(s) Oral at bedtime      LABS:                          12.3   6.47  )-----------( 119      ( 04 May 2024 05:10 )             37.3     05-04    139  |  103  |  25.2<H>  ----------------------------<  126<H>  4.1   |  21.0<L>  |  0.90    Ca    8.5      04 May 2024 05:10  Mg     1.9     05-04    TPro  6.5<L>  /  Alb  3.8  /  TBili  0.9  /  DBili  x   /  AST  25  /  ALT  17  /  AlkPhos  82  05-04    LIVER FUNCTIONS - ( 04 May 2024 05:10 )  Alb: 3.8 g/dL / Pro: 6.5 g/dL / ALK PHOS: 82 U/L / ALT: 17 U/L / AST: 25 U/L / GGT: x                 Urinalysis Basic - ( 04 May 2024 05:10 )    Color: x / Appearance: x / SG: x / pH: x  Gluc: 126 mg/dL / Ketone: x  / Bili: x / Urobili: x   Blood: x / Protein: x / Nitrite: x   Leuk Esterase: x / RBC: x / WBC x   Sq Epi: x / Non Sq Epi: x / Bacteria: x      CAPILLARY BLOOD GLUCOSE      POCT Blood Glucose.: 111 mg/dL (05 May 2024 17:29)  POCT Blood Glucose.: 133 mg/dL (05 May 2024 12:14)  POCT Blood Glucose.: 105 mg/dL (05 May 2024 04:46)  POCT Blood Glucose.: 131 mg/dL (05 May 2024 00:14)  POCT Blood Glucose.: 120 mg/dL (04 May 2024 18:07)      RADIOLOGY & ADDITIONAL TESTS:      Consultant notes reviewed    Case discussed with consultant/provider/ family /patient

## 2024-05-05 NOTE — PROGRESS NOTE ADULT - SUBJECTIVE AND OBJECTIVE BOX
Preliminary note, offical recommendations pending attending review/signature   [facility] Stroke Team  Progress Note     HPI:  89 y/o F with hx of Atrial Fibrillation, not on AC (patient choice), HTN, CHF, Prediabetes, who was transferred from [facility] for LM2 occlusion on 5/1/24. Patient presented yesterday to [facility] ED, LKW at 8:30AM, walked to bathroom, then  heard loud noise and found her with R sided weakness and aphasia.  CTH negative, CTA showed LM2 occlusion. Patient received TNK at 10:33 am and then transferred to [facility] for mechanical thrombectomy. During angiogram, she was found to have a distal LM4 occlusion, so no thrombectomy was performed.  Repeat CT head this AM, stable.      Per family: patient was not on AC by choice--they state she is sensitive to medications so she never wanted to start AC.      SUBJECTIVE: No events overnight.  No new neurologic complaints.  ROS reported negative unless otherwise noted.    acetaminophen     Tablet .. 650 milliGRAM(s) Oral every 6 hours PRN  amLODIPine   Tablet 5 milliGRAM(s) Oral daily  aspirin  chewable 81 milliGRAM(s) Oral daily  atorvastatin 40 milliGRAM(s) Oral at bedtime  chlorhexidine 2% Cloths 1 Application(s) Topical <User Schedule>  dextrose 50% Injectable 25 Gram(s) IV Push once  dextrose 50% Injectable 12.5 Gram(s) IV Push once  enoxaparin Injectable 40 milliGRAM(s) SubCutaneous every 24 hours  ferrous    sulfate 325 milliGRAM(s) Oral daily  furosemide    Tablet 40 milliGRAM(s) Oral daily  insulin lispro (ADMELOG) corrective regimen sliding scale   SubCutaneous every 6 hours  memantine 5 milliGRAM(s) Oral at bedtime  metoprolol succinate ER 50 milliGRAM(s) Oral daily  polyethylene glycol 3350 17 Gram(s) Oral daily  senna 2 Tablet(s) Oral at bedtime      PHYSICAL EXAM:   Vital Signs Last 24 Hrs  T(C): 36.7 (05 May 2024 04:42), Max: 36.8 (04 May 2024 12:00)  T(F): 98.1 (05 May 2024 04:42), Max: 98.2 (04 May 2024 12:00)  HR: 72 (05 May 2024 04:42) (60 - 72)  BP: 159/71 (05 May 2024 04:42) (124/64 - 159/71)  BP(mean): --  RR: 16 (05 May 2024 04:42) (16 - 18)  SpO2: 100% (05 May 2024 04:42) (95% - 100%)    Parameters below as of 05 May 2024 04:42  Patient On (Oxygen Delivery Method): room air    PENDING  General: No acute distress.   Detailed Neurologic Exam:  Mental status: The patient is awake and alert and has normal attention span. She is oriented to name, month, and year. Able to name objects and function. Able to name color. Able to repeat and follow simple commands. Cannot follow two step commands. Mild comprehension disrupted.   Cranial nerves: Pupils equal and react symmetrically to light. Right temporal inferior quadrantopia.  Extraocular motion is full with no nystagmus. There is no ptosis. Facial sensation is intact. Facial musculature is symmetric. Palate elevates symmetrically. Tongue is midline.  Motor: There is normal bulk and tone.  There is no tremor.  Strength is 5-/5 in the right arm, with mild pronator drift and hand  is 4/5, RLE is 5/5  Strength is 5/5 in the left arm and leg.  Sensation: Intact to light touch and pin in 4 extremities  Cerebellar: There is no dysmetria on finger to nose testing.  Gait : deferred     LABS:                        12.3   6.47  )-----------( 119      ( 04 May 2024 05:10 )             37.3    05-04    139  |  103  |  25.2<H>  ----------------------------<  126<H>  4.1   |  21.0<L>  |  0.90    Ca    8.5      04 May 2024 05:10  Mg     1.9     05-04    TPro  6.5<L>  /  Alb  3.8  /  TBili  0.9  /  DBili  x   /  AST  25  /  ALT  17  /  AlkPhos  82  05-04      05-02 Chol 154 LDL 96 HDL 40<L> Trig 90    A1C: 5.5%    IMAGING: Reviewed by me.   MR Head No Cont (05.04.24 @ 17:39)   IMPRESSION:  Patchy acute infarcts in the left cerebral hemisphere as discussed above.    Infarcts are noted to involve Broca's speech area and also the primary   motor and primary sensory cortices.  No hemorrhagic conversion.     TTE W or WO Ultrasound Enhancing Agent (05.03.24 @ 16:29)   CONCLUSIONS:   1. Left ventricular cavity is normal in size. Left ventricular wall thickness is normal. Left ventricular systolic function is normal with an ejection fraction visually estimated at 65 to 70 %.   2. Normal filling pressure. Analysis of left ventricular diastolic function and filling pressure is made challenging by the presence of atrial fibrillation.   3. Mildly enlarged right ventricular cavity size and borderline reduced systolic function.   4. The left atrium is severely dilated.   5. The right atrium is dilated.   6. Moderate aortic stenosis. Trace aortic regurgitation.   7. There is moderate calcification of the mitral valve annulus.   8. Estimated pulmonary artery systolic pressure is 50 mmHg, mild to moderate pulmonary hypertension.   9. Thickened mitral valve leaflets.  10. Mild mitral valve leaflet calcification.  11. Moderate mitral regurgitation.  12. Moderate tricuspid regurgitation.  13. Mild to moderate pulmonic regurgitation.  14. No pericardial effusion seen.  15. Compared to the transthoracic echocardiogram performed on 8/8/2023, there have been no significant interval changes.    CTA head and Neck and CTP 5/1/24  CT angiogram neck:  -No hemodynamically significant stenosis.    CT angiogram head:  -Occlusion of a left MCA proximal M2 branch.    CT perfusion:  -49 mL penumbra identified in the left MCA territory.  -No completed core infarct identified by perfusion analysis, although   this is likely underestimated as suspected infarct is seen on noncontrast   CT in the left anterior insula.      CT Brain Stroke Protocol (05.01.24 @ 09:58)   IMPRESSION:  Suspected infarct involving the left anterior insula.    No acute intracranial hemorrhage.    CT Head No Cont (05.02.24 @ 10:16)   Stable hypodensity involving the the inferior left frontal   lobe and left insular ribbon suggesting an acute infarction. No acute   intracranial hemorrhage. Consider MRI for further evaluation.     Preliminary note, offical recommendations pending attending review/signature   [facility] Stroke Team  Progress Note     HPI:  89 y/o F with hx of Atrial Fibrillation, not on AC (patient choice), HTN, CHF, Prediabetes, who was transferred from [facility] for LM2 occlusion on 5/1/24. Patient presented yesterday to [facility] ED, LKW at 8:30AM, walked to bathroom, then  heard loud noise and found her with R sided weakness and aphasia.  CTH negative, CTA showed LM2 occlusion. Patient received TNK at 10:33 am and then transferred to [facility] for mechanical thrombectomy. During angiogram, she was found to have a distal LM4 occlusion, so no thrombectomy was performed.  Repeat CT head this AM, stable.      Per family: patient was not on AC by choice--they state she is sensitive to medications so she never wanted to start AC.      SUBJECTIVE: No events overnight. No new neurologic complaints.  ROS reported negative unless otherwise noted.    acetaminophen     Tablet .. 650 milliGRAM(s) Oral every 6 hours PRN  amLODIPine   Tablet 5 milliGRAM(s) Oral daily  aspirin  chewable 81 milliGRAM(s) Oral daily  atorvastatin 40 milliGRAM(s) Oral at bedtime  chlorhexidine 2% Cloths 1 Application(s) Topical <User Schedule>  dextrose 50% Injectable 25 Gram(s) IV Push once  dextrose 50% Injectable 12.5 Gram(s) IV Push once  enoxaparin Injectable 40 milliGRAM(s) SubCutaneous every 24 hours  ferrous    sulfate 325 milliGRAM(s) Oral daily  furosemide    Tablet 40 milliGRAM(s) Oral daily  insulin lispro (ADMELOG) corrective regimen sliding scale   SubCutaneous every 6 hours  memantine 5 milliGRAM(s) Oral at bedtime  metoprolol succinate ER 50 milliGRAM(s) Oral daily  polyethylene glycol 3350 17 Gram(s) Oral daily  senna 2 Tablet(s) Oral at bedtime      PHYSICAL EXAM:   Vital Signs Last 24 Hrs  T(C): 36.7 (05 May 2024 04:42), Max: 36.8 (04 May 2024 12:00)  T(F): 98.1 (05 May 2024 04:42), Max: 98.2 (04 May 2024 12:00)  HR: 72 (05 May 2024 04:42) (60 - 72)  BP: 159/71 (05 May 2024 04:42) (124/64 - 159/71)  BP(mean): --  RR: 16 (05 May 2024 04:42) (16 - 18)  SpO2: 100% (05 May 2024 04:42) (95% - 100%)    Parameters below as of 05 May 2024 04:42  Patient On (Oxygen Delivery Method): room air      General: No acute distress. Seen sitting in the chair eating.   Detailed Neurologic Exam:  Mental status: The patient is awake and alert and has normal attention span. She is oriented to name, month, and year. States that the place is [facility].  Able to name objects and function. Able to name color. Able to repeat and follow simple commands. Cannot follow two step commands. Mild comprehension disrupted.   Cranial nerves: Pupils equal and react symmetrically to light. Visual fields to confrontation intact. Extraocular motion is full with no nystagmus. There is no ptosis. Facial sensation is intact. Facial musculature is symmetric. Palate elevates symmetrically. Tongue is midline.  Motor: There is normal bulk and tone.  There is no tremor.  Strength is 5-/5 in the right arm, with mild pronator drift and hand  is 4/5, RLE is 5/5  Strength is 5/5 in the left arm and leg.  Sensation: Intact to light touch and pin in 4 extremities  Cerebellar: There is no dysmetria on finger to nose testing.  Gait : deferred     LABS:                        12.3   6.47  )-----------( 119      ( 04 May 2024 05:10 )             37.3    05-04    139  |  103  |  25.2<H>  ----------------------------<  126<H>  4.1   |  21.0<L>  |  0.90    Ca    8.5      04 May 2024 05:10  Mg     1.9     05-04    TPro  6.5<L>  /  Alb  3.8  /  TBili  0.9  /  DBili  x   /  AST  25  /  ALT  17  /  AlkPhos  82  05-04      05-02 Chol 154 LDL 96 HDL 40<L> Trig 90    A1C: 5.5%    IMAGING: Reviewed by me.   MR Head No Cont (05.04.24 @ 17:39)   IMPRESSION:  Patchy acute infarcts in the left cerebral hemisphere as discussed above.    Infarcts are noted to involve Broca's speech area and also the primary   motor and primary sensory cortices.  No hemorrhagic conversion.     TTE W or WO Ultrasound Enhancing Agent (05.03.24 @ 16:29)   CONCLUSIONS:   1. Left ventricular cavity is normal in size. Left ventricular wall thickness is normal. Left ventricular systolic function is normal with an ejection fraction visually estimated at 65 to 70 %.   2. Normal filling pressure. Analysis of left ventricular diastolic function and filling pressure is made challenging by the presence of atrial fibrillation.   3. Mildly enlarged right ventricular cavity size and borderline reduced systolic function.   4. The left atrium is severely dilated.   5. The right atrium is dilated.   6. Moderate aortic stenosis. Trace aortic regurgitation.   7. There is moderate calcification of the mitral valve annulus.   8. Estimated pulmonary artery systolic pressure is 50 mmHg, mild to moderate pulmonary hypertension.   9. Thickened mitral valve leaflets.  10. Mild mitral valve leaflet calcification.  11. Moderate mitral regurgitation.  12. Moderate tricuspid regurgitation.  13. Mild to moderate pulmonic regurgitation.  14. No pericardial effusion seen.  15. Compared to the transthoracic echocardiogram performed on 8/8/2023, there have been no significant interval changes.    CTA head and Neck and CTP 5/1/24  CT angiogram neck:  -No hemodynamically significant stenosis.    CT angiogram head:  -Occlusion of a left MCA proximal M2 branch.    CT perfusion:  -49 mL penumbra identified in the left MCA territory.  -No completed core infarct identified by perfusion analysis, although   this is likely underestimated as suspected infarct is seen on noncontrast   CT in the left anterior insula.      CT Brain Stroke Protocol (05.01.24 @ 09:58)   IMPRESSION:  Suspected infarct involving the left anterior insula.    No acute intracranial hemorrhage.    CT Head No Cont (05.02.24 @ 10:16)   Stable hypodensity involving the the inferior left frontal   lobe and left insular ribbon suggesting an acute infarction. No acute   intracranial hemorrhage. Consider MRI for further evaluation.    US Duplex Kidneys (05.03.24 @ 17:54)   IMPRESSION:  Significant stenosis at the left renal artery origin.             [facility] Stroke Team  Progress Note     HPI:  89 y/o F with hx of Atrial Fibrillation, not on AC (patient choice), HTN, CHF, Prediabetes, who was transferred from [facility] for LM2 occlusion on 5/1/24. Patient presented yesterday to [facility] ED, LKW at 8:30AM, walked to bathroom, then  heard loud noise and found her with R sided weakness and aphasia.  CTH negative, CTA showed LM2 occlusion. Patient received TNK at 10:33 am and then transferred to [facility] for mechanical thrombectomy. During angiogram, she was found to have a distal LM4 occlusion, so no thrombectomy was performed.  Repeat CT head this AM, stable.      Per family: patient was not on AC by choice--they state she is sensitive to medications so she never wanted to start AC.      SUBJECTIVE: No events overnight. No new neurologic complaints.  ROS reported negative unless otherwise noted.    acetaminophen     Tablet .. 650 milliGRAM(s) Oral every 6 hours PRN  amLODIPine   Tablet 5 milliGRAM(s) Oral daily  aspirin  chewable 81 milliGRAM(s) Oral daily  atorvastatin 40 milliGRAM(s) Oral at bedtime  chlorhexidine 2% Cloths 1 Application(s) Topical <User Schedule>  dextrose 50% Injectable 25 Gram(s) IV Push once  dextrose 50% Injectable 12.5 Gram(s) IV Push once  enoxaparin Injectable 40 milliGRAM(s) SubCutaneous every 24 hours  ferrous    sulfate 325 milliGRAM(s) Oral daily  furosemide    Tablet 40 milliGRAM(s) Oral daily  insulin lispro (ADMELOG) corrective regimen sliding scale   SubCutaneous every 6 hours  memantine 5 milliGRAM(s) Oral at bedtime  metoprolol succinate ER 50 milliGRAM(s) Oral daily  polyethylene glycol 3350 17 Gram(s) Oral daily  senna 2 Tablet(s) Oral at bedtime      PHYSICAL EXAM:   Vital Signs Last 24 Hrs  T(C): 36.7 (05 May 2024 04:42), Max: 36.8 (04 May 2024 12:00)  T(F): 98.1 (05 May 2024 04:42), Max: 98.2 (04 May 2024 12:00)  HR: 72 (05 May 2024 04:42) (60 - 72)  BP: 159/71 (05 May 2024 04:42) (124/64 - 159/71)  BP(mean): --  RR: 16 (05 May 2024 04:42) (16 - 18)  SpO2: 100% (05 May 2024 04:42) (95% - 100%)    Parameters below as of 05 May 2024 04:42  Patient On (Oxygen Delivery Method): room air      General: No acute distress. Seen sitting in the chair eating.   Detailed Neurologic Exam:  Mental status: The patient is awake and alert and has normal attention span. She is oriented to name, month, and year. States that the place is [facility].  Able to name objects and function. Able to name color. Able to repeat and follow simple commands. Cannot follow two step commands. Mild comprehension disrupted.   Cranial nerves: Pupils equal and react symmetrically to light. Visual fields to confrontation intact. Extraocular motion is full with no nystagmus. There is no ptosis. Facial sensation is intact. Facial musculature is symmetric. Palate elevates symmetrically. Tongue is midline.  Motor: There is normal bulk and tone.  There is no tremor.  Strength is 5-/5 in the right arm, with mild pronator drift and hand  is 4/5, RLE is 5/5  Strength is 5/5 in the left arm and leg.  Sensation: Intact to light touch and pin in 4 extremities  Cerebellar: There is no dysmetria on finger to nose testing.  Gait : deferred     LABS:                        12.3   6.47  )-----------( 119      ( 04 May 2024 05:10 )             37.3    05-04    139  |  103  |  25.2<H>  ----------------------------<  126<H>  4.1   |  21.0<L>  |  0.90    Ca    8.5      04 May 2024 05:10  Mg     1.9     05-04    TPro  6.5<L>  /  Alb  3.8  /  TBili  0.9  /  DBili  x   /  AST  25  /  ALT  17  /  AlkPhos  82  05-04      05-02 Chol 154 LDL 96 HDL 40<L> Trig 90    A1C: 5.5%    IMAGING: Reviewed by me.   MR Head No Cont (05.04.24 @ 17:39)   IMPRESSION:  Patchy acute infarcts in the left cerebral hemisphere as discussed above.    Infarcts are noted to involve Broca's speech area and also the primary   motor and primary sensory cortices.  No hemorrhagic conversion.     TTE W or WO Ultrasound Enhancing Agent (05.03.24 @ 16:29)   CONCLUSIONS:   1. Left ventricular cavity is normal in size. Left ventricular wall thickness is normal. Left ventricular systolic function is normal with an ejection fraction visually estimated at 65 to 70 %.   2. Normal filling pressure. Analysis of left ventricular diastolic function and filling pressure is made challenging by the presence of atrial fibrillation.   3. Mildly enlarged right ventricular cavity size and borderline reduced systolic function.   4. The left atrium is severely dilated.   5. The right atrium is dilated.   6. Moderate aortic stenosis. Trace aortic regurgitation.   7. There is moderate calcification of the mitral valve annulus.   8. Estimated pulmonary artery systolic pressure is 50 mmHg, mild to moderate pulmonary hypertension.   9. Thickened mitral valve leaflets.  10. Mild mitral valve leaflet calcification.  11. Moderate mitral regurgitation.  12. Moderate tricuspid regurgitation.  13. Mild to moderate pulmonic regurgitation.  14. No pericardial effusion seen.  15. Compared to the transthoracic echocardiogram performed on 8/8/2023, there have been no significant interval changes.    CTA head and Neck and CTP 5/1/24  CT angiogram neck:  -No hemodynamically significant stenosis.    CT angiogram head:  -Occlusion of a left MCA proximal M2 branch.    CT perfusion:  -49 mL penumbra identified in the left MCA territory.  -No completed core infarct identified by perfusion analysis, although   this is likely underestimated as suspected infarct is seen on noncontrast   CT in the left anterior insula.      CT Brain Stroke Protocol (05.01.24 @ 09:58)   IMPRESSION:  Suspected infarct involving the left anterior insula.    No acute intracranial hemorrhage.    CT Head No Cont (05.02.24 @ 10:16)   Stable hypodensity involving the the inferior left frontal   lobe and left insular ribbon suggesting an acute infarction. No acute   intracranial hemorrhage. Consider MRI for further evaluation.    US Duplex Kidneys (05.03.24 @ 17:54)   IMPRESSION:  Significant stenosis at the left renal artery origin.

## 2024-05-05 NOTE — PROGRESS NOTE ADULT - ASSESSMENT
89F with PMH afib (informed by her cardiologist that she should take eliquis but refused as per daughter), HF, HTN who presents with stroke. Patient's last known well 8:30 am, found down by  this morning with R sided weakness and unable to speak.  She was taken to [facility], code stroke called. CTH negative, CTA showed LM2 occlusion. Patient received TNK. She was then transferred to [facility] for mechanical thrombectomy. During angiogram, she was found to have a distal LM4 occlusion, so no thrombectomy was performed. She was extubated and admitted to NSICU for post TNK care. CTH post TNK stable with hypodensity involving the the inferior left frontal lobe and left insular ribbon suggesting an acute infarction. Patient is downgraded to medicine pm 5/2 and neuro and pmr consulted    #Afib not on AC now with new CVA s/p TNK   asa and high dose statin   mri pending- pmr recommends [facility]    #Hypertension- with right renal artery stenosis - will discuss with IR in AM     #Afib- no ac for now per neuro - pending MRI     #anemia - iron studies appreciated  - add po iron     #diet - reg diet    dispo - pmr consult appreciated   pmr, add namenda qhs  dispo - claire middleton on monday

## 2024-05-06 ENCOUNTER — APPOINTMENT (OUTPATIENT)
Dept: PULMONOLOGY | Facility: CLINIC | Age: 89
End: 2024-05-06

## 2024-05-06 LAB
GLUCOSE BLDC GLUCOMTR-MCNC: 110 MG/DL — HIGH (ref 70–99)
GLUCOSE BLDC GLUCOMTR-MCNC: 122 MG/DL — HIGH (ref 70–99)
GLUCOSE BLDC GLUCOMTR-MCNC: 144 MG/DL — HIGH (ref 70–99)

## 2024-05-06 PROCEDURE — 99233 SBSQ HOSP IP/OBS HIGH 50: CPT | Mod: GC

## 2024-05-06 PROCEDURE — 99233 SBSQ HOSP IP/OBS HIGH 50: CPT

## 2024-05-06 RX ADMIN — Medication 325 MILLIGRAM(S): at 08:37

## 2024-05-06 RX ADMIN — CHLORHEXIDINE GLUCONATE 1 APPLICATION(S): 213 SOLUTION TOPICAL at 05:33

## 2024-05-06 RX ADMIN — Medication 81 MILLIGRAM(S): at 08:37

## 2024-05-06 RX ADMIN — Medication 40 MILLIGRAM(S): at 05:34

## 2024-05-06 RX ADMIN — Medication 50 MILLIGRAM(S): at 05:33

## 2024-05-06 RX ADMIN — AMLODIPINE BESYLATE 5 MILLIGRAM(S): 2.5 TABLET ORAL at 05:34

## 2024-05-06 RX ADMIN — ENOXAPARIN SODIUM 40 MILLIGRAM(S): 100 INJECTION SUBCUTANEOUS at 12:42

## 2024-05-06 RX ADMIN — ATORVASTATIN CALCIUM 40 MILLIGRAM(S): 80 TABLET, FILM COATED ORAL at 22:20

## 2024-05-06 RX ADMIN — MEMANTINE HYDROCHLORIDE 5 MILLIGRAM(S): 10 TABLET ORAL at 22:20

## 2024-05-06 NOTE — PROGRESS NOTE ADULT - NSPROGADDITIONALINFOA_GEN_ALL_CORE
89yo F with PMH Afib, HF, HTN who presents as transfer from [place] with CVA 2/2 to M2/M4 occlusion s/p TNK and dx angiogram. Workup for HTN while inpatient that demonstrates L renal artery stenosis. Patient blood pressure is well-controlled at this time on medical tx (metoprolol and amlodipine that was started inpatient, home ramipril held).     Plan:   - Continue medical management of HTN   - No acute surgical  intervention is indicated at this time

## 2024-05-06 NOTE — PROGRESS NOTE ADULT - ASSESSMENT
ASSESSMENT: 89 y/o F with hx of Atrial Fibrillation, not on AC (patient choice), HTN, CHF, Prediabetes, who was transferred from [facility] for LM2 occlusion.  Patient presented yesterday to [facility] ED, LKW at 8:30AM, walked to bathroom, then  heard loud noise and found her with R sided weakness and aphasia.  CTH negative, CTA showed LM2 occlusion. Patient received TNK at 10:33 am and then transferred to [facility] for mechanical thrombectomy. During angiogram, she was found to have a distal LM4 occlusion, so no thrombectomy was performed.]    MRI brain w/out revealed patchy acute infarcts in the left cerebral hemisphere. Etiology likely cardioembolic in setting of hx of atrial fibrillation and not on anticoagulation     NEURO:   -Continue close monitoring for neurologic deterioration  -Neurologically with improvement. Still with mild aphasia and right pronator drift. Right inferior temporal quadrantanopia appeared to be resolved.   -Patient is not allowed to drive. Must see neuro-ophthalmologists and get clearance from the optho standpoint to drive. Would also need neurology clearance after discharge due to RUE drift and weakness.   -Stroke neuro checks q 4h   -SBP goal normotension  -ANTITHROMBOTIC THERAPY: ASA 81mg for now. Patient should have head CT on 5/7. If stable patient can then be placed on Eliquis 5mg BID in setting of afib. At that point ASA 81mg daily is not needed from the neurological standpoint. Patient should monitor weights at home, as if patient gets to weight below 60kg would then consider to lower Eliquis dose.   -titrate statin to LDL goal less than 70 - on atorvastatin 40 mg daily  -MRI Brain w/o pending  -Dysphagia screen: pass   -Physical therapy/OT/Speech eval/treatment.     -CARDIOVASCULAR: TTE as noted with severely dilated LA. 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  -Vascular consult pending  -If plan for stent placement would need neurological clearance from the stroke standpoint, as well as patient being on Eliquis.   Na Goal:  135-145     Mott: N    ID: monitor for si/sx of infection     OTHER:  condition and plan of care d/w patient and family at bedside, questions and concerns addressed.   -Medication compliance strongly advised.  -Strict risk factor modifications    -Further care per primary team.     DISPOSITION: Rehab or home depending on PT eval once stable and workup is complete    CORE MEASURES:        Admission NIHSS: 6     Tenecteplase : [x] YES [] NO      LDL/HDL/A1C: 96/40/5.5     Depression Screen- if depression hx and/or present      Statin Therapy: Atorvastatin     Dysphagia Screen: [x] PASS [] FAIL     Smoking [] YES [x] NO      Afib [x] YES [] NO     Stroke Education [] YES [] NO Pending    Obtain screening lower extremity venous ultrasound in patients who meet 1 or more of the following criteria as patient is high risk for DVT/PE on admission:   [] History of DVT/PE  []Hypercoagulable states (Factor V Leiden, Cancer, OCP, etc. )  []Prolonged immobility (hemiplegia/hemiparesis/post operative or any other extended immobilization)  [] Transferred from outside facility (Rehab or Long term care)  [] Age </= to 50

## 2024-05-06 NOTE — CONSULT NOTE ADULT - SUBJECTIVE AND OBJECTIVE BOX
Vascular Surgery Consult      HPI (per medicine)   89F with PMH afib, HF, HTN who presents as transfer from [place] with CVA ( (5/1) , noted to have R sided weakness and aphasia. CTA demonstrating L M2 occlusion s/p angiogram with distal L M4 occlusion s/p TNK tx with no thrombectomy  performed. Patient required NSICU, since downgraded to medical team. Plan for acute inpatient rehab.     Patient with a hx of HTN , on ACE-I (ramapril) and beta-blocker (metoprolol) at home. Inpatient , patient  noted to be hypertensive. Max BP recorded is 164/80, with systolics ranging from 127-164. Workup for HTN by primary team, included a renal artery duplex that demonstrates L renal  artery stenosis  (at the origin). Vascular consulted for evaluation.  Patient denies any HA, changes in vision, blurry vision, CP and/or SOB.       PAST MEDICAL HISTORY:  H/O pleural effusion    HTN, age 0-18    HTN (hypertension)    Hyperlipidemia    Prediabetes    Basal cell carcinoma    Atrial fibrillation          PAST SURGICAL HISTORY:  History of total right hip replacement          MEDICATIONS:  acetaminophen     Tablet .. 650 milliGRAM(s) Oral every 6 hours PRN  amLODIPine   Tablet 5 milliGRAM(s) Oral daily  aspirin  chewable 81 milliGRAM(s) Oral daily  atorvastatin 40 milliGRAM(s) Oral at bedtime  chlorhexidine 2% Cloths 1 Application(s) Topical <User Schedule>  dextrose 50% Injectable 25 Gram(s) IV Push once  dextrose 50% Injectable 12.5 Gram(s) IV Push once  enoxaparin Injectable 40 milliGRAM(s) SubCutaneous every 24 hours  ferrous    sulfate 325 milliGRAM(s) Oral daily  furosemide    Tablet 40 milliGRAM(s) Oral daily  insulin lispro (ADMELOG) corrective regimen sliding scale   SubCutaneous every 6 hours  memantine 5 milliGRAM(s) Oral at bedtime  metoprolol succinate ER 50 milliGRAM(s) Oral daily  polyethylene glycol 3350 17 Gram(s) Oral daily  senna 2 Tablet(s) Oral at bedtime        ALLERGIES:  penicillins (Swelling)  penicillin (Unknown)  latex (Rash)        VITALS & I/Os:  Vital Signs Last 24 Hrs  T(C): 36.6 (06 May 2024 07:30), Max: 36.7 (05 May 2024 16:36)  T(F): 97.9 (06 May 2024 07:30), Max: 98 (05 May 2024 16:36)  HR: 71 (06 May 2024 07:30) (66 - 77)  BP: 127/60 (06 May 2024 07:30) (127/60 - 164/80)  BP(mean): --  RR: 18 (06 May 2024 07:30) (18 - 18)  SpO2: 98% (06 May 2024 07:30) (98% - 100%)    Parameters below as of 06 May 2024 07:30  Patient On (Oxygen Delivery Method): room air        I&O's Summary    05 May 2024 07:01  -  06 May 2024 07:00  --------------------------------------------------------  IN: 240 mL / OUT: 1100 mL / NET: -860 mL    06 May 2024 07:01  -  06 May 2024 13:25  --------------------------------------------------------  IN: 300 mL / OUT: 0 mL / NET: 300 mL          PHYSICAL EXAM:  Constitutional: patient resting comfortably in bed, in no acute distress  Respiratory: respirations are unlabored, no accessory muscle use, no conversational dyspnea  Gastrointestinal: Abdomen soft, non-tender, non-distended, no rebound tenderness / guarding  Neurological:  A&O x 3  MAEW        LABS:          Lactate:                    IMAGING:  < from: US Duplex Kidneys (05.03.24 @ 17:54) >    FINDINGS:  Right kidney: 7.8 cm. No renal mass, hydronephrosis or calculi.  Left kidney: 10.6 cm. No renal mass, hydronephrosis or calculi.    Urinary bladder: Withinnormal limits.    Color and spectral Doppler reveals normal, symmetric blood flow   throughout both kidneys.  Peak aortic velocity is 56 cm/sec.  IVC/Renal Veins: Patent.    RIGHT  Renal Artery:  Peak systolic velocity is 74 cm/sec origin, 69 cm/sec proximal, 82 cm/sec   mid, 56 cm/sec distal and 67 cm/sec hilum.  Upper Segmental Artery:  RI = 0.7  Middle Segmental Artery: RI = 0.8  Lower Segmental Artery: RI = 0.8    LEFT  Renal Artery:  Peak systolic velocity is 256 cm/sec origin, 244 cm/sec proximal, 143   cm/sec mid, 156 cm/sec distal and 111 cm/sec hilum.  Upper Segmental Artery:  RI = 0.8  Middle Segmental Artery: RI = 0.8  Lower Segmental Artery: RI = 0.8    IMPRESSION:    Significant stenosis at the left renal artery origin.

## 2024-05-06 NOTE — PROGRESS NOTE ADULT - SUBJECTIVE AND OBJECTIVE BOX
Preliminary note, official recommendations pending attending review/signature   Seen and examined by Stroke team attending/team, assessment/ plan as discussed with stroke team attending/team as noted.     [facility] Stroke Team  Progress Note     HPI:  89 y/o F with hx of Atrial Fibrillation, not on AC (patient choice), HTN, CHF, Prediabetes, who was transferred from [facility] for LM2 occlusion on 5/1/24. Patient presented yesterday to \[facility] [place] [facility]\"" ED, LKW at 8:30AM, walked to bathroom, then  heard loud noise and found her with R sided weakness and aphasia.  CTH negative, CTA showed LM2 occlusion. Patient received TNK at 10:33 am and then transferred to [facility] for mechanical thrombectomy. During angiogram, she was found to have a distal LM4 occlusion, so no thrombectomy was performed.  Repeat CT head this AM, stable.      Per family: patient was not on AC by choice--they state she is sensitive to medications so she never wanted to start AC.        SUBJECTIVE: No events overnight.  No new neurologic complaints.  ROS reported negative unless otherwise noted.      acetaminophen     Tablet .. 650 milliGRAM(s) Oral every 6 hours PRN  amLODIPine   Tablet 5 milliGRAM(s) Oral daily  aspirin  chewable 81 milliGRAM(s) Oral daily  atorvastatin 40 milliGRAM(s) Oral at bedtime  chlorhexidine 2% Cloths 1 Application(s) Topical <User Schedule>  dextrose 50% Injectable 25 Gram(s) IV Push once  dextrose 50% Injectable 12.5 Gram(s) IV Push once  enoxaparin Injectable 40 milliGRAM(s) SubCutaneous every 24 hours  ferrous    sulfate 325 milliGRAM(s) Oral daily  furosemide    Tablet 40 milliGRAM(s) Oral daily  insulin lispro (ADMELOG) corrective regimen sliding scale   SubCutaneous every 6 hours  memantine 5 milliGRAM(s) Oral at bedtime  metoprolol succinate ER 50 milliGRAM(s) Oral daily  polyethylene glycol 3350 17 Gram(s) Oral daily  senna 2 Tablet(s) Oral at bedtime      PHYSICAL EXAM:   Vital Signs Last 24 Hrs  T(C): 36.6 (06 May 2024 07:30), Max: 36.7 (05 May 2024 16:36)  T(F): 97.9 (06 May 2024 07:30), Max: 98 (05 May 2024 16:36)  HR: 71 (06 May 2024 07:30) (66 - 77)  BP: 127/60 (06 May 2024 07:30) (127/60 - 164/80)  BP(mean): --  RR: 18 (06 May 2024 07:30) (18 - 18)  SpO2: 98% (06 May 2024 07:30) (98% - 100%)    Parameters below as of 06 May 2024 07:30  Patient On (Oxygen Delivery Method): room air        General: No acute distress. Seen sitting in the chair eating.     Detailed Neurologic Exam:  Mental status: The patient is awake and alert and has normal attention span. She is oriented to name, month, and year. States that the place is [facility].  Able to name objects and function. Able to name color. Able to repeat and follow simple commands. Cannot follow two step commands. Mild comprehension disrupted.   Cranial nerves: Pupils equal and react symmetrically to light. Visual fields to confrontation intact. Extraocular motion is full with no nystagmus. There is no ptosis. Facial sensation is intact. Facial musculature is symmetric. Palate elevates symmetrically. Tongue is midline.  Motor: There is normal bulk and tone.  There is no tremor.  Strength is 5-/5 in the right arm, with mild pronator drift and hand  is 4/5, RLE is 5/5  Strength is 5/5 in the left arm and leg.  Sensation: Intact to light touch and pin in 4 extremities  Cerebellar: There is no dysmetria on finger to nose testing.  Gait : deferred       LABS:             05-02 Chol 154 LDL -- HDL 40<L> Trig 90    A1C: 5.5    IMAGING: Reviewed by me.       MR Head No Cont (05.04.24 @ 17:39)   IMPRESSION:  Patchy acute infarcts in the left cerebral hemisphere as discussed above.    Infarcts are noted to involve Broca's speech area and also the primary   motor and primary sensory cortices.  No hemorrhagic conversion.    TTE W or WO Ultrasound Enhancing Agent (05.03.24 @ 16:29)   CONCLUSIONS:   1. Left ventricular cavity is normal in size. Left ventricular wall thickness is normal. Left ventricular systolic function is normal with an ejection fraction visually estimated at 65 to 70 %.   2. Normal filling pressure. Analysis of left ventricular diastolic function and filling pressure is made challenging by the presence of atrial fibrillation.   3. Mildly enlarged right ventricular cavity size and borderline reduced systolic function.   4. The left atrium is severely dilated.   5. The right atrium is dilated.   6. Moderate aortic stenosis. Trace aortic regurgitation.   7. There is moderate calcification of the mitral valve annulus.   8. Estimated pulmonary artery systolic pressure is 50 mmHg, mild to moderate pulmonary hypertension.   9. Thickened mitral valve leaflets.  10. Mild mitral valve leaflet calcification.  11. Moderate mitral regurgitation.  12. Moderate tricuspid regurgitation.  13. Mild to moderate pulmonic regurgitation.  14. No pericardial effusion seen.  15. Compared to the transthoracic echocardiogram performed on 8/8/2023, there have been no significant interval changes.    CTA head and Neck and CTP 5/1/24  CT angiogram neck:  -No hemodynamically significant stenosis.    CT angiogram head:  -Occlusion of a left MCA proximal M2 branch.    CT perfusion:  -49 mL penumbra identified in the left MCA territory.  -No completed core infarct identified by perfusion analysis, although   this is likely underestimated as suspected infarct is seen on noncontrast   CT in the left anterior insula.      CT Brain Stroke Protocol (05.01.24 @ 09:58)   IMPRESSION:  Suspected infarct involving the left anterior insula.    No acute intracranial hemorrhage.    CT Head No Cont (05.02.24 @ 10:16)   Stable hypodensity involving the the inferior left frontal   lobe and left insular ribbon suggesting an acute infarction. No acute   intracranial hemorrhage. Consider MRI for further evaluation.    US Duplex Kidneys (05.03.24 @ 17:54)   IMPRESSION:  Significant stenosis at the left renal artery origin.   Preliminary note, official recommendations pending attending review/signature   Seen and examined by Stroke team attending/team, assessment/ plan as discussed with stroke team attending/team as noted.     [facility] Stroke Team  Progress Note     HPI:  89 y/o F with hx of Atrial Fibrillation, not on AC (patient choice), HTN, CHF, Prediabetes, who was transferred from [facility] for LM2 occlusion on 5/1/24. Patient presented yesterday to [facility] [place] ED, LKW at 8:30AM, walked to bathroom, then  heard loud noise and found her with R sided weakness and aphasia.  CTH negative, CTA showed LM2 occlusion. Patient received TNK at 10:33 am and then transferred to [facility] for mechanical thrombectomy. During angiogram, she was found to have a distal LM4 occlusion, so no thrombectomy was performed.  Repeat CT head this AM, stable.      Per family: patient was not on AC by choice--they state she is sensitive to medications so she never wanted to start AC.        SUBJECTIVE: No events overnight.  No new neurologic complaints.  ROS reported negative unless otherwise noted.      acetaminophen     Tablet .. 650 milliGRAM(s) Oral every 6 hours PRN  amLODIPine   Tablet 5 milliGRAM(s) Oral daily  aspirin  chewable 81 milliGRAM(s) Oral daily  atorvastatin 40 milliGRAM(s) Oral at bedtime  chlorhexidine 2% Cloths 1 Application(s) Topical <User Schedule>  dextrose 50% Injectable 25 Gram(s) IV Push once  dextrose 50% Injectable 12.5 Gram(s) IV Push once  enoxaparin Injectable 40 milliGRAM(s) SubCutaneous every 24 hours  ferrous    sulfate 325 milliGRAM(s) Oral daily  furosemide    Tablet 40 milliGRAM(s) Oral daily  insulin lispro (ADMELOG) corrective regimen sliding scale   SubCutaneous every 6 hours  memantine 5 milliGRAM(s) Oral at bedtime  metoprolol succinate ER 50 milliGRAM(s) Oral daily  polyethylene glycol 3350 17 Gram(s) Oral daily  senna 2 Tablet(s) Oral at bedtime      PHYSICAL EXAM:   Vital Signs Last 24 Hrs  T(C): 36.6 (06 May 2024 07:30), Max: 36.7 (05 May 2024 16:36)  T(F): 97.9 (06 May 2024 07:30), Max: 98 (05 May 2024 16:36)  HR: 71 (06 May 2024 07:30) (66 - 77)  BP: 127/60 (06 May 2024 07:30) (127/60 - 164/80)  BP(mean): --  RR: 18 (06 May 2024 07:30) (18 - 18)  SpO2: 98% (06 May 2024 07:30) (98% - 100%)    Parameters below as of 06 May 2024 07:30  Patient On (Oxygen Delivery Method): room air        General: No acute distress. Seen sitting in the chair eating.     Detailed Neurologic Exam:  Mental status: The patient is awake and alert and has normal attention span. She is oriented to name, month, and year. States that the place is [facility].  Able to name objects and function. Able to name color. Able to repeat and follow simple commands. Cannot follow two step commands. Mild comprehension disrupted.   Cranial nerves: Pupils equal and react symmetrically to light. Visual fields to confrontation intact. Extraocular motion is full with no nystagmus. There is no ptosis. Facial sensation is intact. Facial musculature is symmetric. Palate elevates symmetrically. Tongue is midline.  Motor: There is normal bulk and tone.  There is no tremor.  Strength is 5-/5 in the right arm, with mild pronator drift- improving and hand  is 4+/5, RLE is 5/5  Strength is 5/5 in the left arm and leg.  Sensation: Intact to light touch and pin in 4 extremities  Cerebellar: There is no dysmetria on finger to nose testing.  Gait : deferred       LABS:             05-02 Chol 154 LDL -- HDL 40<L> Trig 90    A1C: 5.5    IMAGING: Reviewed by me.       MR Head No Cont (05.04.24 @ 17:39)   IMPRESSION:  Patchy acute infarcts in the left cerebral hemisphere as discussed above.    Infarcts are noted to involve Broca's speech area and also the primary   motor and primary sensory cortices.  No hemorrhagic conversion.    TTE W or WO Ultrasound Enhancing Agent (05.03.24 @ 16:29)   CONCLUSIONS:   1. Left ventricular cavity is normal in size. Left ventricular wall thickness is normal. Left ventricular systolic function is normal with an ejection fraction visually estimated at 65 to 70 %.   2. Normal filling pressure. Analysis of left ventricular diastolic function and filling pressure is made challenging by the presence of atrial fibrillation.   3. Mildly enlarged right ventricular cavity size and borderline reduced systolic function.   4. The left atrium is severely dilated.   5. The right atrium is dilated.   6. Moderate aortic stenosis. Trace aortic regurgitation.   7. There is moderate calcification of the mitral valve annulus.   8. Estimated pulmonary artery systolic pressure is 50 mmHg, mild to moderate pulmonary hypertension.   9. Thickened mitral valve leaflets.  10. Mild mitral valve leaflet calcification.  11. Moderate mitral regurgitation.  12. Moderate tricuspid regurgitation.  13. Mild to moderate pulmonic regurgitation.  14. No pericardial effusion seen.  15. Compared to the transthoracic echocardiogram performed on 8/8/2023, there have been no significant interval changes.    CTA head and Neck and CTP 5/1/24  CT angiogram neck:  -No hemodynamically significant stenosis.    CT angiogram head:  -Occlusion of a left MCA proximal M2 branch.    CT perfusion:  -49 mL penumbra identified in the left MCA territory.  -No completed core infarct identified by perfusion analysis, although   this is likely underestimated as suspected infarct is seen on noncontrast   CT in the left anterior insula.      CT Brain Stroke Protocol (05.01.24 @ 09:58)   IMPRESSION:  Suspected infarct involving the left anterior insula.    No acute intracranial hemorrhage.    CT Head No Cont (05.02.24 @ 10:16)   Stable hypodensity involving the the inferior left frontal   lobe and left insular ribbon suggesting an acute infarction. No acute   intracranial hemorrhage. Consider MRI for further evaluation.    US Duplex Kidneys (05.03.24 @ 17:54)   IMPRESSION:  Significant stenosis at the left renal artery origin.     [facility] Stroke Team  Progress Note     HPI:  89 y/o F with hx of Atrial Fibrillation, not on AC (patient choice), HTN, CHF, Prediabetes, who was transferred from [facility] for LM2 occlusion on 5/1/24. Patient presented yesterday to [facility] ED, LKW at 8:30AM, walked to bathroom, then  heard loud noise and found her with R sided weakness and aphasia.  CTH negative, CTA showed LM2 occlusion. Patient received TNK at 10:33 am and then transferred to [facility] for mechanical thrombectomy. During angiogram, she was found to have a distal LM4 occlusion, so no thrombectomy was performed.  Repeat CT head this AM, stable.      Per family: patient was not on AC by choice--they state she is sensitive to medications so she never wanted to start AC.        SUBJECTIVE: No events overnight.  No new neurologic complaints.  ROS reported negative unless otherwise noted.      acetaminophen     Tablet .. 650 milliGRAM(s) Oral every 6 hours PRN  amLODIPine   Tablet 5 milliGRAM(s) Oral daily  aspirin  chewable 81 milliGRAM(s) Oral daily  atorvastatin 40 milliGRAM(s) Oral at bedtime  chlorhexidine 2% Cloths 1 Application(s) Topical <User Schedule>  dextrose 50% Injectable 25 Gram(s) IV Push once  dextrose 50% Injectable 12.5 Gram(s) IV Push once  enoxaparin Injectable 40 milliGRAM(s) SubCutaneous every 24 hours  ferrous    sulfate 325 milliGRAM(s) Oral daily  furosemide    Tablet 40 milliGRAM(s) Oral daily  insulin lispro (ADMELOG) corrective regimen sliding scale   SubCutaneous every 6 hours  memantine 5 milliGRAM(s) Oral at bedtime  metoprolol succinate ER 50 milliGRAM(s) Oral daily  polyethylene glycol 3350 17 Gram(s) Oral daily  senna 2 Tablet(s) Oral at bedtime      PHYSICAL EXAM:   Vital Signs Last 24 Hrs  T(C): 36.6 (06 May 2024 07:30), Max: 36.7 (05 May 2024 16:36)  T(F): 97.9 (06 May 2024 07:30), Max: 98 (05 May 2024 16:36)  HR: 71 (06 May 2024 07:30) (66 - 77)  BP: 127/60 (06 May 2024 07:30) (127/60 - 164/80)  BP(mean): --  RR: 18 (06 May 2024 07:30) (18 - 18)  SpO2: 98% (06 May 2024 07:30) (98% - 100%)    Parameters below as of 06 May 2024 07:30  Patient On (Oxygen Delivery Method): room air        General: No acute distress. Seen sitting in the chair eating.     Detailed Neurologic Exam:  Mental status: The patient is awake and alert and has normal attention span. She is oriented to name, month, and year. States that the place is [facility].  Able to name objects and function. Able to name color. Able to repeat and follow simple commands. Cannot follow two step commands. Mild comprehension disrupted.   Cranial nerves: Pupils equal and react symmetrically to light. Visual fields to confrontation intact. Extraocular motion is full with no nystagmus. There is no ptosis. Facial sensation is intact. Facial musculature is symmetric. Palate elevates symmetrically. Tongue is midline.  Motor: There is normal bulk and tone.  There is no tremor.  Strength is 5-/5 in the right arm, with mild pronator drift- improving and hand  is 4+/5, RLE is 5/5  Strength is 5/5 in the left arm and leg.  Sensation: Intact to light touch and pin in 4 extremities  Cerebellar: There is no dysmetria on finger to nose testing.  Gait : deferred       LABS:             05-02 Chol 154 LDL -- HDL 40<L> Trig 90    A1C: 5.5    IMAGING: Reviewed by me.       MR Head No Cont (05.04.24 @ 17:39)   IMPRESSION:  Patchy acute infarcts in the left cerebral hemisphere as discussed above.    Infarcts are noted to involve Broca's speech area and also the primary   motor and primary sensory cortices.  No hemorrhagic conversion.    TTE W or WO Ultrasound Enhancing Agent (05.03.24 @ 16:29)   CONCLUSIONS:   1. Left ventricular cavity is normal in size. Left ventricular wall thickness is normal. Left ventricular systolic function is normal with an ejection fraction visually estimated at 65 to 70 %.   2. Normal filling pressure. Analysis of left ventricular diastolic function and filling pressure is made challenging by the presence of atrial fibrillation.   3. Mildly enlarged right ventricular cavity size and borderline reduced systolic function.   4. The left atrium is severely dilated.   5. The right atrium is dilated.   6. Moderate aortic stenosis. Trace aortic regurgitation.   7. There is moderate calcification of the mitral valve annulus.   8. Estimated pulmonary artery systolic pressure is 50 mmHg, mild to moderate pulmonary hypertension.   9. Thickened mitral valve leaflets.  10. Mild mitral valve leaflet calcification.  11. Moderate mitral regurgitation.  12. Moderate tricuspid regurgitation.  13. Mild to moderate pulmonic regurgitation.  14. No pericardial effusion seen.  15. Compared to the transthoracic echocardiogram performed on 8/8/2023, there have been no significant interval changes.    CTA head and Neck and CTP 5/1/24  CT angiogram neck:  -No hemodynamically significant stenosis.    CT angiogram head:  -Occlusion of a left MCA proximal M2 branch.    CT perfusion:  -49 mL penumbra identified in the left MCA territory.  -No completed core infarct identified by perfusion analysis, although   this is likely underestimated as suspected infarct is seen on noncontrast   CT in the left anterior insula.      CT Brain Stroke Protocol (05.01.24 @ 09:58)   IMPRESSION:  Suspected infarct involving the left anterior insula.    No acute intracranial hemorrhage.    CT Head No Cont (05.02.24 @ 10:16)   Stable hypodensity involving the the inferior left frontal   lobe and left insular ribbon suggesting an acute infarction. No acute   intracranial hemorrhage. Consider MRI for further evaluation.    US Duplex Kidneys (05.03.24 @ 17:54)   IMPRESSION:  Significant stenosis at the left renal artery origin.

## 2024-05-06 NOTE — PROGRESS NOTE ADULT - ASSESSMENT
ASSESSMENT/PLAN  89yFemale with functional deficits after L M2 occlusion  CVA - ASA (with ? plans of DCing and starting Eliquis 5mg BID), HLD with receptive aphasia  HF - furosemide, metoprolol, amlodipine  Pain - Tylenol  DVT PPX - SCDs and lovenox  Rehab -   Patient living independently w/ use of a SAC prior to CVA. Therapist evaluations with significant impairment of functional status. Patient requires ongoing medical management and evaluation (pending TTE and MRI). Recommend ACUTE inpatient rehabilitation for the functional deficits consisting of 3 hours of therapy/day & 24 hour RN/daily PMR physician for comorbid medical management. Patient will be able to tolerate 3 hours a day.    Recommend OOB daily    Will continue to follow for ongoing rehab needs and recommendations.    ASSESSMENT/PLAN  89yFemale with functional deficits after L M2 occlusion  CVA - ASA (with ? plans of DCing and starting Eliquis 5mg BID), HLD with receptive aphasia. Started namenda 5mg qHs.   HF - furosemide, metoprolol, amlodipine  Pain - Tylenol  DVT PPX - SCDs and lovenox  Rehab -   Patient living independently w/ use of a SAC prior to CVA. Therapist evaluations with significant impairment of functional status. Patient requires ongoing medical management and evaluation (pending TTE and MRI). Recommend ACUTE inpatient rehabilitation for the functional deficits consisting of 3 hours of therapy/day & 24 hour RN/daily PMR physician for comorbid medical management. Patient will be able to tolerate 3 hours a day.    Recommend OOB daily    Will continue to follow for ongoing rehab needs and recommendations.

## 2024-05-06 NOTE — PROGRESS NOTE ADULT - ASSESSMENT
89F with PMH afib (informed by her cardiologist that she should take eliquis but refused as per daughter), HF, HTN who presents with stroke. Patient's last known well 8:30 am, found down by  this morning with R sided weakness and unable to speak.  She was taken to [facility], code stroke called. CTH negative, CTA showed LM2 occlusion. Patient received TNK. She was then transferred to [facility] for mechanical thrombectomy. During angiogram, she was found to have a distal LM4 occlusion, so no thrombectomy was performed. She was extubated and admitted to NSICU for post TNK care. CTH post TNK stable with hypodensity involving the the inferior left frontal lobe and left insular ribbon suggesting an acute infarction. Patient is downgraded to medicine pm 5/2 and neuro and pmr consulted    #Afib not on AC now with new CVA s/p TNK   asa and high dose statin   renal artery stenosis right side - discussed with vascular and neuro stroke - no intervention- DC to rehab in am   MRI brain w/out revealed patchy acute infarcts in the left cerebral hemisphere. Etiology likely cardioembolic in setting of hx of atrial fibrillation and not on anticoagulation     #Hypertension- with right renal artery stenosis - will discuss with IR in AM     #Afib- no ac for now per neuro - pending MRI     #anemia - iron studies appreciated  - add po iron     #diet - reg diet    dispo - pmr consult appreciated   pmr, add aria qhs

## 2024-05-06 NOTE — PROGRESS NOTE ADULT - ATTENDING COMMENTS
[facility] Well Child Check    ASSESSMENT & PLAN  Ruth Pleitez is a 13  y.o. 5  m.o. who has normal growth and normal development.    Diagnoses and all orders for this visit:    Encounter for well child visit at 13 years of age  -     HPV vaccine 9 valent 2 dose IM (If started before age 15)  -     Hearing Screening  -     Vision Screening  -     Pediatric Symptom Checklist (50213)        Return to clinic in 1 year for a Well Child Check or sooner as needed    IMMUNIZATIONS/LABS  Immunizations were reviewed and orders were placed as appropriate.  I have discussed the risks and benefits of all of the vaccine components with the patient/parents.  All questions have been answered.    REFERRALS  Dental:  The patient has already established care with a dentist.  Other:  No additional referrals were made at this time.    ANTICIPATORY GUIDANCE  I have reviewed age appropriate anticipatory guidance.    HEALTH HISTORY  Do you have any concerns that you'd like to discuss today?: sport induced headache from times to time      Roomed by: Obdulia    Accompanied by Mother    Refills needed? No    Do you have any forms that need to be filled out? No        Do you have any significant health concerns in your family history?: No  No family history on file.  Since your last visit, have there been any major changes in your family, such as a move, job change, separation, divorce, or death in the family?: No  Has a lack of transportation kept you from medical appointments?: No    Home  Who lives in your home?:  Mom, dad, sister  Social History     Social History Narrative     Not on file     Do you have any concerns about losing your housing?: No  Is your housing safe and comfortable?: Yes  Do you have any trouble with sleep?:  No    Education  What school do you child attend?:  [employer]  What grade are you in?:  7th  How do you perform in school (grades, behavior, attention, homework?: great     Eating  Do you eat regular  "meals including fruits and vegetables?:  yes  What are you drinking (cow's milk, water, soda, juice, sports drinks, energy drinks, etc)?: cow's milk- skim, water and juice  Have you been worried that you don't have enough food?: No  Do you have concerns about your body or appearance?:  No    Activities  Do you have friends?:  yes  Do you get at least one hour of physical activity per day?:  yes  How many hours a day are you in front of a screen other than for schoolwork (computer, TV, phone)?:  6  What do you do for exercise?:  Soccer, bike, play outside  Do you have interest/participate in community activities/volunteers/school sports?:  yes, soccer, basketball    VISION/HEARING  Vision: Completed. See Results  Hearing:  Completed. See Results    No exam data present    MENTAL HEALTH SCREENING  No flowsheet data found.  Social-emotional & mental health screening: Pediatric Symptom Checklist-Youth PASS (<30 pass), no followup necessary  No concerns    TB Risk Assessment:  The patient and/or parent/guardian answer positive to:  no known risk of TB    Dyslipidemia Risk Screening  Have either of your parents or any of your grandparents had a stroke or heart attack before age 55?: No  Any parents with high cholesterol or currently taking medications to treat?: No     Dental  When was the last time you saw the dentist?: 1-3 months ago   Parent/Guardian declines the fluoride varnish application today. Fluoride not applied today.    There is no problem list on file for this patient.      Drugs  Does the patient use tobacco/alcohol/drugs?:  no    Safety  Does the patient have any safety concerns (peer or home)?:  no  Does the patient use safety belts, helmets and other safety equipment?:  yes    Sex  Have you ever had sex?:  No    MEASUREMENTS  Height:  5' 5.5\" (1.664 m)  Weight: 124 lb 4.8 oz (56.4 kg)  BMI: Body mass index is 20.37 kg/m .  Blood Pressure: 90/62  Blood pressure reading is in the normal blood pressure range " based on the 2017 AAP Clinical Practice Guideline.    PHYSICAL EXAM  Constitutional: She appears well-developed and well-nourished.   HEENT: Head: Normocephalic.    Right Ear: Tympanic membrane, external ear and canal normal.    Left Ear: Tympanic membrane, external ear and canal normal.    Nose: Nose normal.    Mouth/Throat: Mucous membranes are moist. Oropharynx is clear.    Eyes: Conjunctivae and lids are normal. Pupils are equal, round, and reactive to light. Optic discs are sharp.   Neck: Neck supple. No tenderness is present.   Cardiovascular: Normal rate and regular rhythm. No murmur heard.  Pulses: Femoral pulses are 2+ bilaterally.   Pulmonary/Chest: Effort normal and breath sounds normal. There is normal air entry. Breast development is normal.  Juan stage 3  Abdominal: Soft. There is no hepatosplenomegaly. No inguinal hernia.   Musculoskeletal: Normal range of motion. Normal strength and tone. No abnormalities. Spine is straight. Normal duck walk.  Normal heel to toe walk.   : Normal external female genitalia.  Juan stage 3  Neurological: She is alert. She has normal reflexes. Gait normal.   Psychiatric: She has a normal mood and affect. Her speech is normal and behavior is normal.  Skin: Clear. No rashes.        Patient seen and examined. Case discussed with Dr. Abebe. Agree with recommendations.     Rehab/Impaired mobility and function - Patient continues to require hospitalization for the above diagnoses and ongoing active management of comorbid complications (pending renal stent) that are substantially impairing functional ability and impairing quality of life.     RECOMMEND - OOB daily     When medically optimized, based on the patient's diagnosis, current functional status and potential for progress, recommend ACUTE inpatient rehabilitation for the functional deficits consisting of 3 hours of therapy/day & 24 hour RN/daily PMR physician for comorbid medical management. Patient will be able to tolerate 3 hours a day.    Discussed with daughter and would like to go to [facility] due to travel/distance for spouse.     Will continue to follow. Rehab recommendations are dependent on how functional progress changes as well as how patient continues to participate and tolerate therapeutic interventions, which may change. Recommend ongoing mobilization by staff to maintain cardiopulmonary function and prevention of secondary complications related to debility. Discussed the specific management and recommendations above with rehab clinical care team/rehab liaison.

## 2024-05-06 NOTE — CONSULT NOTE ADULT - ASSESSMENT
A: 89yo F with PMH afib, HF, HTN who presents as transfer from [place] with CVA 2/2 to M2/M4 occlusion s/p TNK and dx angiogram. Workup for HTN while inpatient that demonstrates L renal artery stenosis. Patient blood pressure is well-controlled at this time on medical tx (metoprolol and amlodipine that was started inpatient, home ramapril held).     Plan:   - Continue medical management of HTN   - No acute surgical  intervention is indicated at this time
89 y/o F with hx of Atrial Fibrillation, not on AC (patient choice), HTN, CHF, Prediabetes, who was transferred from [facility] for LM2 occlusion.  Patient presented yesterday to [facility] ED, LKW at 8:30AM, walked to bathroom, then  heard loud noise and found her with R sided weakness and aphasia.  CTH negative, CTA showed LM2 occlusion. Patient received TNK at 10:33 am and then transferred to [facility] for mechanical thrombectomy. During angiogram, she was found to have a distal LM4 occlusion, so no thrombectomy was performed.  Repeat CT head this AM, stable.      Stroke mechanism likely cardioembolic from Afib.  Discussed with family and patient, that she will require anticoagulation for secondary stroke prevention, pending anemia workup.      PLAN:  Neuro: Acute ischemic stroke, Aphasia, RUE paresis  - Neurochecks q2h  - MRI Brain without contrast  - Antiplatelet: ASA 81mg for now.  Will need to consider anticoagulation pending MRI Brain and anemia w/u  - continue statin with a goal LDL under 70   - LDL 96--Recommend Statin with goal LDL < 70  - A1c 5.5  - TTE  - STAT head CT for any neurologic change   - maintain normoglycemia, normothermia, and normovolemia   - BP goal: Normotension  - PT/OT, SLP evals  - DVT prophylaxis      
ASSESSMENT/PLAN  89yFemale with functional deficits after L M2 occlusion  CVA - ASA, HLD with receptive aphasia  HF - furosemide, metoprolol, amlodipine  Pain - Tylenol  DVT PPX - SCDs and lovenox  Rehab -   Patient living independently w/ use of a SAC prior to CVA. Therapist evaluations with significant impairment of functional status. Patient requires ongoing medical management and evaluation (pending TTE and MRI). Recommend ACUTE inpatient rehabilitation for the functional deficits consisting of 3 hours of therapy/day & 24 hour RN/daily PMR physician for comorbid medical management. Patient will be able to tolerate 3 hours a day.    Recommend OOB daily    Will continue to follow for ongoing rehab needs and recommendations.

## 2024-05-06 NOTE — PROGRESS NOTE ADULT - SUBJECTIVE AND OBJECTIVE BOX
LAURI MENDEZ Patient is a 89y old  Female who presents with a chief complaint of stroke (06 May 2024 13:23)     HPI:  89F with PMH afib, HF, HTN who presents with stroke. Patient's last known well 8:30 am, found down by  this morning with R sided weakness and unable to speak.  She was taken to [facility], code stroke called. CTH negative, CTA showed LM2 occlusion. Patient received TNK at 10:33 am. She was then transferred to [facility] for mechanical thrombectomy. During angiogram, she was found to have a distal LM4 occlusion, so no thrombectomy was performed. She was extubated and admitted to NSICU for post TNK care.       The patient was seen and evaluated conversing   The patient is in no acute distress.  Denied any fever chest pain, palpitations, shortness of breath, abdominal pain, fever, dysuria, cough, edema       I&O's Summary    05 May 2024 07:01  -  06 May 2024 07:00  --------------------------------------------------------  IN: 240 mL / OUT: 1100 mL / NET: -860 mL    06 May 2024 07:01  -  06 May 2024 18:35  --------------------------------------------------------  IN: 900 mL / OUT: 700 mL / NET: 200 mL      Allergies    penicillins (Swelling)  penicillin (Unknown)  latex (Rash)    Intolerances      HEALTH ISSUES - PROBLEM Dx:        PAST MEDICAL & SURGICAL HISTORY:  H/O pleural effusion      HTN (hypertension)      Hyperlipidemia      Prediabetes      Basal cell carcinoma  and squamous cell carcinoma removed      Atrial fibrillation      History of total right hip replacement  left 2015              Vital Signs Last 24 Hrs  T(C): 36.6 (06 May 2024 15:42), Max: 36.7 (05 May 2024 21:38)  T(F): 97.9 (06 May 2024 15:42), Max: 98 (05 May 2024 21:38)  HR: 64 (06 May 2024 15:42) (64 - 77)  BP: 163/75 (06 May 2024 15:42) (127/60 - 164/80)  BP(mean): --  RR: 18 (06 May 2024 15:42) (18 - 18)  SpO2: 100% (06 May 2024 15:42) (98% - 100%)    Parameters below as of 06 May 2024 15:42  Patient On (Oxygen Delivery Method): room air    T(C): 36.6 (05-06-24 @ 15:42), Max: 36.7 (05-05-24 @ 21:38)  HR: 64 (05-06-24 @ 15:42) (64 - 77)  BP: 163/75 (05-06-24 @ 15:42) (127/60 - 164/80)  RR: 18 (05-06-24 @ 15:42) (18 - 18)  SpO2: 100% (05-06-24 @ 15:42) (98% - 100%)  Wt(kg): --    PHYSICAL EXAM:    GENERAL: NAD  HEAD:  Atraumatic, Normocephalic  EYES: EOMI, PERRL, conjunctiva and sclera clear  ENMT:  Moist mucous membranes,  No lesions  NECK: Supple, No JVD, Normal thyroid  NERVOUS SYSTEM:  Alert & Oriented X3,  Moves upper and lower extremities; CNS-II-XII  CHEST/LUNG: Clear to auscultation bilaterally; No rales, rhonchi, wheezing,   HEART: Regular rate and rhythm; No murmurs,   ABDOMEN: Soft, Nontender, Nondistended; Bowel sounds present  EXTREMITIES:  Peripheral Pulses, No  cyanosis, or edema  SKIN: No rashes or lesions  psychiatry- mood and affect appropriate, Insight and judgement intact     acetaminophen     Tablet .. 650 milliGRAM(s) Oral every 6 hours PRN  amLODIPine   Tablet 5 milliGRAM(s) Oral daily  aspirin  chewable 81 milliGRAM(s) Oral daily  atorvastatin 40 milliGRAM(s) Oral at bedtime  chlorhexidine 2% Cloths 1 Application(s) Topical <User Schedule>  dextrose 50% Injectable 25 Gram(s) IV Push once  dextrose 50% Injectable 12.5 Gram(s) IV Push once  enoxaparin Injectable 40 milliGRAM(s) SubCutaneous every 24 hours  ferrous    sulfate 325 milliGRAM(s) Oral daily  furosemide    Tablet 40 milliGRAM(s) Oral daily  insulin lispro (ADMELOG) corrective regimen sliding scale   SubCutaneous every 6 hours  memantine 5 milliGRAM(s) Oral at bedtime  metoprolol succinate ER 50 milliGRAM(s) Oral daily  polyethylene glycol 3350 17 Gram(s) Oral daily  senna 2 Tablet(s) Oral at bedtime      LABS:                      CAPILLARY BLOOD GLUCOSE      POCT Blood Glucose.: 144 mg/dL (06 May 2024 18:09)  POCT Blood Glucose.: 122 mg/dL (06 May 2024 12:38)  POCT Blood Glucose.: 110 mg/dL (06 May 2024 05:38)  POCT Blood Glucose.: 144 mg/dL (05 May 2024 23:47)      RADIOLOGY & ADDITIONAL TESTS:      Consultant notes reviewed    Case discussed with consultant/provider/ family /patient

## 2024-05-06 NOTE — PROGRESS NOTE ADULT - SUBJECTIVE AND OBJECTIVE BOX
FUNCTIONAL PROGRESS    SLP:   Pt presents w/ moderate-severe deficits in primary receptive/expressive language skills. Receptively, pt able to follow simple commands w/ 65% accuracy & answer simple yes/no questions w/ 75% accuracy. Pt benefits from increased time, repetitions and rephrasing. Significant difficulty w/ complex commands and complex yes/ no questions despite max cues. Expressively, +word finding deficits in both structured tasks and conversational discourse. +Perseverations on words, numbers and topics w/ inconsistent awareness. Pt requires cues to initiate and completed automatic speech tasks. Pt unable to complete confrontation naming, semantic naming or fluency tasks. Adequate speech intelligibility noted w/ no dysarthria. Higher level cognitive linguistic skills guarded @ this time 2' severity of language deficits.    OT 5/2  Bathing Training:     · Level of [place]  moderate assist (50% patients effort)    Upper Body Dressing Training:     · Level of [place]  moderate assist (50% patients effort)    Lower Body Dressing Training:     · Level of [place]  moderate assist (50% patients effort)    Toilet Hygiene Training:     · Level of [place]  minimum assist (75% patients effort)    Grooming Training:     · Level of [place]  minimum assist (75% patients effort)    Eating/Self-Feeding Training:     · Level of [place]  did not directly observe    Special Training:   · Special Training  pt requires max cues with demonstration and hand over hand to participate in ADL assessment 2*aphasia      PT 5/2    Transfer: Sit to Stand:     · Level of [place]  minimum assist (75% patients effort)  · Physical Assist/Nonphysical Assist  1 person + 1 person to manage equipment; 2nd assist for line management/safety    Transfer: Stand to Sit:     · Level of [place]  minimum assist (75% patients effort)  · Physical Assist/Nonphysical Assist  1 person + 1 person to manage equipment; 2nd assist for line management/safety  · Weight-Bearing Restrictions  weight-bearing as tolerated  · Assistive Device  rolling walker    Sit/Stand Transfer Safety Analysis:     · Transfer Safety Concerns Noted  decreased safety awareness; decreased weight-shifting ability; decreased sequencing ability  · Impairments Contributing to Impaired Transfers  impaired balance; impaired postural control; decreased strength; cognition; impaired coordination    Gait Skills:     · Level of [place]  minimum assist (75% patients effort)  · Physical Assist/Nonphysical Assist  1 person + 1 person to manage equipment; 2nd assist for line management/safety  · Weight-Bearing Restrictions  weight-bearing as tolerated  · Assistive Device  rolling walker  · Gait Distance  25 feet      VITALS  T(C): 36.6 (05-06-24 @ 07:30), Max: 36.7 (05-05-24 @ 16:36)  HR: 71 (05-06-24 @ 07:30) (66 - 77)  BP: 127/60 (05-06-24 @ 07:30) (127/60 - 164/80)  RR: 18 (05-06-24 @ 07:30) (16 - 18)  SpO2: 98% (05-06-24 @ 07:30) (98% - 100%)  Wt(kg): --    MEDICATIONS   acetaminophen     Tablet .. 650 milliGRAM(s) every 6 hours PRN  amLODIPine   Tablet 5 milliGRAM(s) daily  aspirin  chewable 81 milliGRAM(s) daily  atorvastatin 40 milliGRAM(s) at bedtime  chlorhexidine 2% Cloths 1 Application(s) <User Schedule>  dextrose 50% Injectable 12.5 Gram(s) once  dextrose 50% Injectable 25 Gram(s) once  enoxaparin Injectable 40 milliGRAM(s) every 24 hours  ferrous    sulfate 325 milliGRAM(s) daily  furosemide    Tablet 40 milliGRAM(s) daily  insulin lispro (ADMELOG) corrective regimen sliding scale   every 6 hours  memantine 5 milliGRAM(s) at bedtime  metoprolol succinate ER 50 milliGRAM(s) daily  polyethylene glycol 3350 17 Gram(s) daily  senna 2 Tablet(s) at bedtime      ----------------------------------------------------------------------------------------  PHYSICAL EXAM  Constitutional - NAD, Comfortable  HEENT - NCAT, EOMI  Neck - Supple, No limited ROM  Chest - Breathing comfortably, No wheezing  Abdomen - Soft   Extremities - No C/C/E, No calf tenderness   Neurologic Exam -                    Cognitive - Awake, Alert, AAO to self and in a hospital but not to date, specific location, or situation     Communication - Fluent, intermittent mild dysarthria that fluctuates, fluent aphasia present     Cranial Nerves - CN 2-12 intact     Motor - No focal deficits                    LEFT    UE - ShAB 4/5, EF 4/5, EE 4/5, WE 4/5,  4/5                    RIGHT UE - ShAB 3/5, EF 3/5, EE 3/5, WE 3/5,  3/5                    LEFT    LE - HF 4/5, KE 4/5, DF 4/5, PF 4/5                    RIGHT LE - HF 4/5, KE 4/5, DF 4/5, PF 4/5       Sensory - Intact to LT     Reflexes - DTR Intact, No primitive reflexive     Coordination - FTN intact     OculoVestibular - No saccades, No nystagmus, VOR      Psychiatric - Mood stable, Affect WNL  ---------------------------------------------------      RECENT LABS/IMAGING  - Reviewed Today

## 2024-05-07 LAB
GLUCOSE BLDC GLUCOMTR-MCNC: 107 MG/DL — HIGH (ref 70–99)
GLUCOSE BLDC GLUCOMTR-MCNC: 137 MG/DL — HIGH (ref 70–99)
GLUCOSE BLDC GLUCOMTR-MCNC: 140 MG/DL — HIGH (ref 70–99)
GLUCOSE BLDC GLUCOMTR-MCNC: 215 MG/DL — HIGH (ref 70–99)
GLUCOSE BLDC GLUCOMTR-MCNC: 85 MG/DL — SIGNIFICANT CHANGE UP (ref 70–99)

## 2024-05-07 PROCEDURE — 99232 SBSQ HOSP IP/OBS MODERATE 35: CPT

## 2024-05-07 PROCEDURE — 70450 CT HEAD/BRAIN W/O DYE: CPT | Mod: 26

## 2024-05-07 PROCEDURE — 99233 SBSQ HOSP IP/OBS HIGH 50: CPT

## 2024-05-07 RX ORDER — HYDRALAZINE HCL 50 MG
10 TABLET ORAL ONCE
Refills: 0 | Status: COMPLETED | OUTPATIENT
Start: 2024-05-07 | End: 2024-05-07

## 2024-05-07 RX ORDER — APIXABAN 2.5 MG/1
5 TABLET, FILM COATED ORAL EVERY 12 HOURS
Refills: 0 | Status: DISCONTINUED | OUTPATIENT
Start: 2024-05-07 | End: 2024-05-08

## 2024-05-07 RX ADMIN — Medication 81 MILLIGRAM(S): at 08:48

## 2024-05-07 RX ADMIN — CHLORHEXIDINE GLUCONATE 1 APPLICATION(S): 213 SOLUTION TOPICAL at 05:15

## 2024-05-07 RX ADMIN — ATORVASTATIN CALCIUM 40 MILLIGRAM(S): 80 TABLET, FILM COATED ORAL at 21:32

## 2024-05-07 RX ADMIN — AMLODIPINE BESYLATE 5 MILLIGRAM(S): 2.5 TABLET ORAL at 05:16

## 2024-05-07 RX ADMIN — Medication 40 MILLIGRAM(S): at 05:15

## 2024-05-07 RX ADMIN — SENNA PLUS 2 TABLET(S): 8.6 TABLET ORAL at 21:32

## 2024-05-07 RX ADMIN — MEMANTINE HYDROCHLORIDE 5 MILLIGRAM(S): 10 TABLET ORAL at 22:48

## 2024-05-07 RX ADMIN — Medication 10 MILLIGRAM(S): at 22:25

## 2024-05-07 RX ADMIN — Medication 325 MILLIGRAM(S): at 08:48

## 2024-05-07 RX ADMIN — APIXABAN 5 MILLIGRAM(S): 2.5 TABLET, FILM COATED ORAL at 21:32

## 2024-05-07 RX ADMIN — Medication 50 MILLIGRAM(S): at 05:15

## 2024-05-07 NOTE — PROGRESS NOTE ADULT - SUBJECTIVE AND OBJECTIVE BOX
Patient feels well.    FUNCTIONAL PROGRESS  5/6 PT  Sit-Stand Transfer Training  Transfer Training Sit-to-Stand Transfer: minimum assist (75% patient effort);  1 person assist  Transfer Training Stand-to-Sit Transfer: minimum assist (75% patient effort);  1 person assist  Sit-to-Stand Transfer Training Transfer Safety Analysis: decreased strength    Gait Training  Gait Training: minimum assist (75% patient effort);  1 person assist;  rolling walker;  40ft  Gait Analysis: 2-point gait   decreased step length;  increased trunk flexion;  40ft;  rolling walker      VITALS  T(C): 36.4 (05-07-24 @ 08:10), Max: 36.8 (05-07-24 @ 00:15)  HR: 57 (05-07-24 @ 08:10) (57 - 67)  BP: 140/79 (05-07-24 @ 08:10) (140/79 - 163/75)  RR: 18 (05-07-24 @ 08:10) (18 - 18)  SpO2: 100% (05-07-24 @ 08:10) (97% - 100%)  Wt(kg): --    MEDICATIONS   acetaminophen     Tablet .. 650 milliGRAM(s) every 6 hours PRN  amLODIPine   Tablet 5 milliGRAM(s) daily  aspirin  chewable 81 milliGRAM(s) daily  atorvastatin 40 milliGRAM(s) at bedtime  chlorhexidine 2% Cloths 1 Application(s) <User Schedule>  dextrose 50% Injectable 25 Gram(s) once  dextrose 50% Injectable 12.5 Gram(s) once  enoxaparin Injectable 40 milliGRAM(s) every 24 hours  ferrous    sulfate 325 milliGRAM(s) daily  furosemide    Tablet 40 milliGRAM(s) daily  insulin lispro (ADMELOG) corrective regimen sliding scale   every 6 hours  memantine 5 milliGRAM(s) at bedtime  metoprolol succinate ER 50 milliGRAM(s) daily  polyethylene glycol 3350 17 Gram(s) daily  senna 2 Tablet(s) at bedtime      RECENT LABS/IMAGING  - Reviewed Today          CT Head 5/2 - Stable hypodensity involving the the inferior left frontal lobe and left insular ribbon suggesting an acute infarction. No acute intracranial hemorrhage. Consider MRI for further evaluation.    CTA HEAD - Occlusion of a left MCA proximal M2 branch.    CT perfusion 5/1 - -49 mL penumbra identified in the left MCA territory. -No completed core infarct identified by perfusion analysis, although this is likely underestimated as suspected infarct is seen on noncontrast  CT in the left anterior insula.    MR Head 5/4 - Patchy acute infarcts in the left cerebral hemisphere as discussed above.  Infarcts are noted to involve Broca's speech area and also the primary motor and primary sensory cortices.  No hemorrhagic conversion.      ----------------------------------------------------------------------------------------  PHYSICAL EXAM  Constitutional - NAD, Comfortable  Extremities - No edema  Neurologic Exam -                    Cognitive - AAOx self, situation, year (with choices)     Communication - Expressive deficits     Motor - Left UE paresis     Sensory - Intact to LT  Psychiatric - Mood stable, Affect WNL  ----------------------------------------------------------------------------------------  ASSESSMENT/PLAN  89yFemale with functional deficits after devleoping an acute CVA  Acute left CVA - ASA, Lipitor  CHF/HTN - Norvasc, Lasix, Toprol  Expressive Aphasia - Namenda 5mg HS - RECOMMEND INCREASE to BID  Pain - Tylenol  DVT PPX - SCDs, Lovenox  Rehab/Impaired mobility and function - Patient continues to require hospitalization for the above diagnoses and ongoing active management of comorbid complications (pending renal stent/IR) that are substantially impairing functional ability and impairing quality of life.     RECOMMEND - OOB daily     When medically optimized, based on the patient's diagnosis, current functional status and potential for progress, recommend ACUTE inpatient rehabilitation for the functional deficits consisting of 3 hours of therapy/day & 24 hour RN/daily PMR physician for comorbid medical management. Patient will be able to tolerate 3 hours a day.    Discussed with daughter and would like to go to [facility] due to travel/distance for spouse.     Will continue to follow. Rehab recommendations are dependent on how functional progress changes as well as how patient continues to participate and tolerate therapeutic interventions, which may change. Recommend ongoing mobilization by staff to maintain cardiopulmonary function and prevention of secondary complications related to debility. Discussed the specific management and recommendations above with rehab clinical care team/rehab liaison.

## 2024-05-07 NOTE — PROGRESS NOTE ADULT - SUBJECTIVE AND OBJECTIVE BOX
LAURI MENDEZ    [id number]    History:  The patient is status post XX on XX, POD # XX. Patient is doing well. The patient's pain is controlled using the prescribed pain medications. The patient is participating in physical therapy. Denies nausea, vomiting, chest pain, shortness of breath, abdominal pain or fever. No new complaints. No acute motor or sensory changes are reported.    Vital Signs Last 24 Hrs  T(C): 36.4 (07 May 2024 08:10), Max: 36.8 (07 May 2024 00:15)  T(F): 97.5 (07 May 2024 08:10), Max: 98.2 (07 May 2024 00:15)  HR: 57 (07 May 2024 08:10) (57 - 67)  BP: 140/79 (07 May 2024 08:10) (140/79 - 163/75)  BP(mean): --  RR: 18 (07 May 2024 08:10) (18 - 18)  SpO2: 100% (07 May 2024 08:10) (97% - 100%)    Parameters below as of 07 May 2024 08:10  Patient On (Oxygen Delivery Method): room air      I&O's Summary    06 May 2024 07:01  -  07 May 2024 07:00  --------------------------------------------------------  IN: 900 mL / OUT: 700 mL / NET: 200 mL                  MEDICATIONS  (STANDING):  amLODIPine   Tablet 5 milliGRAM(s) Oral daily  aspirin  chewable 81 milliGRAM(s) Oral daily  atorvastatin 40 milliGRAM(s) Oral at bedtime  chlorhexidine 2% Cloths 1 Application(s) Topical <User Schedule>  dextrose 50% Injectable 25 Gram(s) IV Push once  dextrose 50% Injectable 12.5 Gram(s) IV Push once  enoxaparin Injectable 40 milliGRAM(s) SubCutaneous every 24 hours  ferrous    sulfate 325 milliGRAM(s) Oral daily  furosemide    Tablet 40 milliGRAM(s) Oral daily  insulin lispro (ADMELOG) corrective regimen sliding scale   SubCutaneous every 6 hours  memantine 5 milliGRAM(s) Oral at bedtime  metoprolol succinate ER 50 milliGRAM(s) Oral daily  polyethylene glycol 3350 17 Gram(s) Oral daily  senna 2 Tablet(s) Oral at bedtime    MEDICATIONS  (PRN):  acetaminophen     Tablet .. 650 milliGRAM(s) Oral every 6 hours PRN Temp greater or equal to 38C (100.4F), Mild Pain (1 - 3)      Physical exam: Well padded splint is in good position. The dressing is clean, dry and intact. No wound erythema, discharge, drainage is noted. Sensation to light touch is grossly intact without focal deficit and is symmetric bilaterally. No calf tenderness. Sensation to light touch is grossly intact distally. Motor function distally is 5/5. No foot drop. 2+ dorsalis pedis pulse. Capillary refill is less than 2 seconds. No cyanosis.    Primary Orthopedic Assessment:  •   Secondary  Orthopedic Assessment(s):   •   Secondary  Medical Assessment(s):   •   Plan:   • DVT prophylaxis as prescribed, including use of compression devices and ankle pumps  • Continue physical therapy  • Non-weight bearing of the splinted extremity  • Continue splint as applied  • Elevation of the splinted extremity  • Pain control as clinically indicated  • Incentive spirometry encouraged  • Discharge planning – anticipated discharge is Home / subacute rehabilitation / acute rehabilitation    LAURI MENDEZ    [id number]    History:  The patient was seen and examined on morning rounds with Dr. Alvarez and the vascular team. Patient is doing well, no hypertensive complaints.  Denies nausea, vomiting, chest pain, shortness of breath, abdominal pain or fever. Duplex was reviewed-- pt with left renal artery stenosis, asymptomatic.     Vital Signs Last 24 Hrs  T(C): 36.4 (07 May 2024 08:10), Max: 36.8 (07 May 2024 00:15)  T(F): 97.5 (07 May 2024 08:10), Max: 98.2 (07 May 2024 00:15)  HR: 57 (07 May 2024 08:10) (57 - 67)  BP: 140/79 (07 May 2024 08:10) (140/79 - 163/75)  BP(mean): --  RR: 18 (07 May 2024 08:10) (18 - 18)  SpO2: 100% (07 May 2024 08:10) (97% - 100%)    Parameters below as of 07 May 2024 08:10  Patient On (Oxygen Delivery Method): room air      I&O's Summary    06 May 2024 07:01  -  07 May 2024 07:00  --------------------------------------------------------  IN: 900 mL / OUT: 700 mL / NET: 200 mL      MEDICATIONS  (STANDING):  amLODIPine   Tablet 5 milliGRAM(s) Oral daily  aspirin  chewable 81 milliGRAM(s) Oral daily  atorvastatin 40 milliGRAM(s) Oral at bedtime  chlorhexidine 2% Cloths 1 Application(s) Topical <User Schedule>  dextrose 50% Injectable 25 Gram(s) IV Push once  dextrose 50% Injectable 12.5 Gram(s) IV Push once  enoxaparin Injectable 40 milliGRAM(s) SubCutaneous every 24 hours  ferrous    sulfate 325 milliGRAM(s) Oral daily  furosemide    Tablet 40 milliGRAM(s) Oral daily  insulin lispro (ADMELOG) corrective regimen sliding scale   SubCutaneous every 6 hours  memantine 5 milliGRAM(s) Oral at bedtime  metoprolol succinate ER 50 milliGRAM(s) Oral daily  polyethylene glycol 3350 17 Gram(s) Oral daily  senna 2 Tablet(s) Oral at bedtime    MEDICATIONS  (PRN):  acetaminophen     Tablet .. 650 milliGRAM(s) Oral every 6 hours PRN Temp greater or equal to 38C (100.4F), Mild Pain (1 - 3)    Physical exam: T(C): 36.4 (05-07-24 @ 08:10), Max: 36.8 (05-07-24 @ 00:15)  HR: 57 (05-07-24 @ 08:10) (57 - 67)  BP: 140/79 (05-07-24 @ 08:10) (140/79 - 163/75)  RR: 18 (05-07-24 @ 08:10) (18 - 18)  SpO2: 100% (05-07-24 @ 08:10) (97% - 100%)    CONSTITUTIONAL: Well groomed, no apparent distress, resting in chair comfortably   EYES: PERRLA and symmetric, EOMI, No conjunctival or scleral injection, non-icteric  ENMT: Oral mucosa with moist membranes.             NECK: Supple, symmetric and without tracheal deviation   RESP: No respiratory distress, no use of accessory muscles; CTA b/l, no WRR  CV: RRR, +S1S2, no MRG; no JVD; no peripheral edema  GI: Soft, NT, ND, no rebound, no guarding; no palpable masses; no hepatosplenomegaly; no hernia palpated  LYMPH: No cervical LAD or tenderness; no axillary LAD or tenderness; no inguinal LAD or tenderness  MSK:  No digital clubbing or cyanosis; examination of the (head/neck/spine/ribs/pelvis, RUE, LUE, RLE, LLE) without misalignment,            Normal ROM without pain, no spinal tenderness, normal muscle strength/tone  SKIN: No rashes or ulcers noted; no subcutaneous nodules or induration palpable  NEURO: CN II-XII intact; normal reflexes in upper and lower extremities, sensation intact in upper and lower extremities b/l to light touch   PSYCH: Appropriate insight/judgment; A+O x 3, mood and affect appropriate      Primary Assessment:  • A 89 year old female with asymptomatic left renal artery stenosis-- duplex reviewed with attending. No hypertensive complaints, BP controlled on current regimen       Plan:   • No surgical intervention is warranted at this time  • Continue with hypertensive regimen- Amlodipine/ Metoprolol  • Continue care per primary team

## 2024-05-07 NOTE — DISCHARGE NOTE PROVIDER - NSDCMRMEDTOKEN_GEN_ALL_CORE_FT
amLODIPine 5 mg oral tablet: 1 tab(s) orally once a day  apixaban 5 mg oral tablet: 1 tab(s) orally every 12 hours  atorvastatin 40 mg oral tablet: 1 tab(s) orally once a day (at bedtime)  ferrous sulfate 325 mg (65 mg elemental iron) oral tablet: 1 tab(s) orally every other day  furosemide 40 mg oral tablet: 1 tab(s) orally once a day  insulin lispro 100 units/mL injectable solution: injectable 3 times a day sliding scale , 150-200-2 units,201-250-4 units, 251-300- 6 units  memantine 5 mg oral tablet: 1 tab(s) orally once a day (at bedtime)  metoprolol succinate 50 mg oral tablet, extended release: 1 tab(s) orally once a day  polyethylene glycol 3350 oral powder for reconstitution: 17 gram(s) orally once a day  senna leaf extract oral tablet: 2 tab(s) orally once a day (at bedtime)

## 2024-05-07 NOTE — DISCHARGE NOTE PROVIDER - NSDCCPCAREPLAN_GEN_ALL_CORE_FT
PRINCIPAL DISCHARGE DIAGNOSIS  Diagnosis: CVA (cerebrovascular accident)  Assessment and Plan of Treatment:

## 2024-05-07 NOTE — DISCHARGE NOTE PROVIDER - HOSPITAL COURSE
89F with PMH afib (informed by her cardiologist that she should take eliquis but refused as per daughter), HF, HTN who presents with stroke. Patient's last known well 8:30 am, found down by  this morning with R sided weakness and unable to speak.  She was taken to [facility], code stroke called. CTH negative, CTA showed LM2 occlusion. Patient received TNK. She was then transferred to [facility] for mechanical thrombectomy. During angiogram, she was found to have a distal LM4 occlusion, so no thrombectomy was performed. She was extubated and admitted to NSICU for post TNK care. CTH post TNK stable with hypodensity involving the the inferior left frontal lobe and left insular ribbon suggesting an acute infarction.     #Afib not on AC now with new CVA s/p TNK  asa and high dose statin   Hypertension -renal artery stenosis right side - discussed with vascular and neuro stroke - no intervention- DC to rehab   MRI brain w/out revealed patchy acute infarcts in the left cerebral hemisphere. Etiology likely cardioembolic in setting of hx of atrial fibrillation and not on anticoagulation   #Hypertension- with right renal artery stenosis -no need for intervention discussed with vascular surgery   #Afib-  ac on eliquis BID  #anemia - po iron   Namenda qhs    - Continue medical management of HTN   - No acute surgical  intervention is indicated at this time per vascular for right renal artery stenosis

## 2024-05-07 NOTE — PROGRESS NOTE ADULT - ASSESSMENT
89F with PMH afib (informed by her cardiologist that she should take eliquis but refused as per daughter), HF, HTN who presents with stroke. Patient's last known well 8:30 am, found down by  this morning with R sided weakness and unable to speak.  She was taken to [facility], code stroke called. CTH negative, CTA showed LM2 occlusion. Patient received TNK. She was then transferred to [facility] for mechanical thrombectomy. During angiogram, she was found to have a distal LM4 occlusion, so no thrombectomy was performed. She was extubated and admitted to NSICU for post TNK care. CTH post TNK stable with hypodensity involving the the inferior left frontal lobe and left insular ribbon suggesting an acute infarction. Patient is downgraded to medicine pm 5/2 and neuro and pmr consulted    #Afib not on AC now with new CVA s/p TNK  \eliquis BID - no need for aspirin as per discussion with neurology    renal artery stenosis right side - discussed with vascular and neuro stroke - no intervention- DC to rehab in am   MRI brain w/out revealed patchy acute infarcts in the left cerebral hemisphere.   Etiology likely cardioembolic in setting of hx of atrial fibrillation- start eliquis     #Hypertension- with right renal artery stenosis - no intervention fro renal artery  Lasix amlodipine and metoprolol     #anemia - iron studies appreciated  - add po iron     DC when acute rehab bed arranged     #diet - reg diet    dispo - pmr consult appreciated

## 2024-05-07 NOTE — DISCHARGE NOTE PROVIDER - ATTENDING DISCHARGE PHYSICAL EXAMINATION:
GENERAL: NAD, pleasant conversing  HEAD:  Atraumatic, Normocephalic  EYES: EOMI, PERRL, conjunctiva and sclera clear  ENMT: No tonsillar erythema, exudates, or enlargement; Moist mucous membranes, Good dentition, No lesions  NECK: Supple, No JVD, Normal thyroid  NERVOUS SYSTEM:  Alert & Oriented  Good concentration; Moves B/L upper and lower extremities;   CHEST/LUNG: Clear to percussion bilaterally; No rales, rhonchi, wheezing, or rubs  HEART: Regular rate and rhythm; No murmurs, rubs, or gallops  ABDOMEN: Soft, Nontender, Nondistended; Bowel sounds present  EXTREMITIES:  2+ Peripheral Pulses, No clubbing, cyanosis, or edema  LYMPH: No lymphadenopathy noted

## 2024-05-07 NOTE — DISCHARGE NOTE PROVIDER - CARE PROVIDER_API CALL
Ruperto Andujar  Neurology  [address] [place] [address], [facility] [address]  [place] [address]  Phone: [phone]  Fax: [phone]  Follow Up Time:

## 2024-05-07 NOTE — PROGRESS NOTE ADULT - NS ATTEND AMEND GEN_ALL_CORE FT
as above
.
Seen and examined with the ACP team. Plan discussed with ACPs.
.
Seen and examined with the ACP team. Plan discussed with ACPs.

## 2024-05-07 NOTE — DISCHARGE NOTE PROVIDER - NSDCFUSCHEDAPPT_GEN_ALL_CORE_FT
[facility] Physician [facility]  GERIATRICS [address] [place] [address]   Scheduled Appointment: 05/14/2024

## 2024-05-07 NOTE — PROGRESS NOTE ADULT - ASSESSMENT
INCOMPLETE  ASSESSMENT: 89 y/o F with hx of Atrial Fibrillation, not on AC (patient choice), HTN, CHF, Prediabetes, who was transferred from [facility] for LM2 occlusion.  Patient presented yesterday to [facility] ED, LKW at 8:30AM, walked to bathroom, then  heard loud noise and found her with R sided weakness and aphasia.  CTH negative, CTA showed LM2 occlusion. Patient received TNK at 10:33 am and then transferred to [facility] for mechanical thrombectomy. During angiogram, she was found to have a distal LM4 occlusion, so no thrombectomy was performed.]    MRI brain w/out revealed patchy acute infarcts in the left cerebral hemisphere. Etiology likely cardioembolic in setting of hx of atrial fibrillation and not on anticoagulation     NEURO:   -Continue close monitoring for neurologic deterioration  -Neurologically with improvement. Still with mild aphasia and right pronator drift. Right inferior temporal quadrantanopia appeared to be resolved.   -Patient is not allowed to drive. Must see neuro-ophthalmologists and get clearance from the optho standpoint to drive. Would also need neurology clearance after discharge due to RUE drift and weakness.   -Stroke neuro checks q 4h   -SBP goal normotension  -ANTITHROMBOTIC THERAPY: ASA 81mg for now. Patient should have head CT on 5/7. If stable patient can then be placed on Eliquis 5mg BID in setting of afib. At that point ASA 81mg daily is not needed from the neurological standpoint. Patient should monitor weights at home, as if patient gets to weight below 60kg would then consider to lower Eliquis dose.   -titrate statin to LDL goal less than 70 - on atorvastatin 40 mg daily  -MRI Brain w/o pending  -Dysphagia screen: pass   -Physical therapy/OT/Speech eval/treatment.     -CARDIOVASCULAR: TTE as noted with severely dilated LA. , cardiac monitoring w/ telemetry for now, further evaluation pending findings of noted workup                              -HEMATOLOGY: patient should have all age and risk appropriate malignancy screenings with PCP or sooner if clinically suspected   -Anemia workup per primary team, however appears to be resolved. Patient's family reports hx of iron deficiency anemia.      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  -Vascular consult appreciated who stated no surgical intervention at this time. Controlled with anti-hypertensives   -If plan for stent placement would need neurological clearance from the stroke standpoint  Na Goal:  135-145     Mott: N    ID: monitor for si/sx of infection     OTHER:  condition and plan of care d/w patient and family at bedside, questions and concerns addressed.   -Medication compliance strongly advised.  -Strict risk factor modifications    -Further care per primary team.     DISPOSITION: Rehab or home depending on PT eval once stable and workup is complete    CORE MEASURES:        Admission NIHSS: 6     Tenecteplase : [x] YES [] NO      LDL/HDL/A1C: 96/40/5.5     Depression Screen- if depression hx and/or present      Statin Therapy: Atorvastatin     Dysphagia Screen: [x] PASS [] FAIL     Smoking [] YES [x] NO      Afib [x] YES [] NO     Stroke Education [] YES [] NO Pending    Obtain screening lower extremity venous ultrasound in patients who meet 1 or more of the following criteria as patient is high risk for DVT/PE on admission:   [] History of DVT/PE  []Hypercoagulable states (Factor V Leiden, Cancer, OCP, etc. )  []Prolonged immobility (hemiplegia/hemiparesis/post operative or any other extended immobilization)  [] Transferred from outside facility (Rehab or Long term care)  [] Age </= to 50 INCOMPLETE  ASSESSMENT: 89 y/o F with hx of Atrial Fibrillation, not on AC (patient choice), HTN, CHF, Prediabetes, who was transferred from [facility] for LM2 occlusion.  Patient presented yesterday to [facility] ED, LKW at 8:30AM, walked to bathroom, then  heard loud noise and found her with R sided weakness and aphasia.  CTH negative, CTA showed LM2 occlusion. Patient received TNK at 10:33 am and then transferred to [facility] for mechanical thrombectomy. During angiogram, she was found to have a distal LM4 occlusion, so no thrombectomy was performed.]    MRI brain w/out revealed patchy acute infarcts in the left cerebral hemisphere. Etiology likely cardioembolic in setting of hx of atrial fibrillation and not on anticoagulation     NEURO:   -Continue close monitoring for neurologic deterioration  -Neurologically with improvement. Aphasia improving, as well as RUE strength Right inferior temporal quadrantanopia appeared to be resolved.   -Patient is not allowed to drive. Must see neuro-ophthalmologists and get clearance from the optho standpoint to drive. Would also need neurology clearance after discharge due to RUE drift and weakness.   -Stroke neuro checks q 4h   -SBP goal normotension  -ANTITHROMBOTIC THERAPY: ASA 81mg for now. Repeat head CT 5/7 stable. If stable patient can then be placed on Eliquis 5mg BID in setting of afib. At that point ASA 81mg daily is not needed from the neurological standpoint. Patient should monitor weights at home, as if patient gets to weight below 60kg would then consider to lower Eliquis dose.   -titrate statin to LDL goal less than 70 - on atorvastatin 40 mg daily  -MRI Brain w/o as noted  -Dysphagia screen: pass   -Physical therapy/OT/Speech eval/treatment.     -CARDIOVASCULAR: TTE as noted with severely dilated LA. , cardiac monitoring w/ telemetry for now, further evaluation pending findings of noted workup                              -HEMATOLOGY: patient should have all age and risk appropriate malignancy screenings with PCP or sooner if clinically suspected   -Anemia workup per primary team, however appears to be resolved. Patient's family reports hx of iron deficiency anemia.      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  -Vascular consult appreciated who stated no surgical intervention at this time. Controlled with anti-hypertensives   -If plan for stent placement would need neurological clearance from the stroke standpoint  Na Goal:  135-145     Mott: N    ID: monitor for si/sx of infection     OTHER:  condition and plan of care d/w patient and family at bedside, questions and concerns addressed.   -Medication compliance strongly advised.  -Strict risk factor modifications    -Further care per primary team.     DISPOSITION: Rehab or home depending on PT eval once stable and workup is complete    CORE MEASURES:        Admission NIHSS: 6     Tenecteplase : [x] YES [] NO      LDL/HDL/A1C: 96/40/5.5     Depression Screen- if depression hx and/or present      Statin Therapy: Atorvastatin     Dysphagia Screen: [x] PASS [] FAIL     Smoking [] YES [x] NO      Afib [x] YES [] NO     Stroke Education [] YES [] NO Pending    Obtain screening lower extremity venous ultrasound in patients who meet 1 or more of the following criteria as patient is high risk for DVT/PE on admission:   [] History of DVT/PE  []Hypercoagulable states (Factor V Leiden, Cancer, OCP, etc. )  []Prolonged immobility (hemiplegia/hemiparesis/post operative or any other extended immobilization)  [] Transferred from outside facility (Rehab or Long term care)  [] Age </= to 50 ASSESSMENT: 89 y/o F with hx of Atrial Fibrillation, not on AC (patient choice), HTN, CHF, Prediabetes, who was transferred from [facility] for LM2 occlusion.  Patient presented yesterday to [facility] ED, LKW at 8:30AM, walked to bathroom, then  heard loud noise and found her with R sided weakness and aphasia.  CTH negative, CTA showed LM2 occlusion. Patient received TNK at 10:33 am and then transferred to [facility] for mechanical thrombectomy. During angiogram, she was found to have a distal LM4 occlusion, so no thrombectomy was performed.]    MRI brain w/out revealed patchy acute infarcts in the left cerebral hemisphere. Etiology likely cardioembolic in setting of hx of atrial fibrillation and not on anticoagulation     NEURO:   -Continue close monitoring for neurologic deterioration  -Neurologically with improvement. Aphasia improving, as well as RUE strength Right inferior temporal quadrantanopia appeared to be resolved.   -Patient is not allowed to drive. Must see neuro-ophthalmologists and get clearance from the optho standpoint to drive. Would also need neurology clearance after discharge due to RUE drift and weakness.   -Stroke neuro checks q 4h   -SBP goal normotension  -ANTITHROMBOTIC THERAPY: ASA 81mg for now. Repeat head CT 5/7 stable. In setting of stable neurological exam and neuro imaging patient can be placed on Eliquis 5mg BID in setting of afib. At that point ASA 81mg daily is not needed from the neurological standpoint. Patient should monitor weights at home, as if patient gets to weight below 60kg would then consider to lower Eliquis dose. Risks and benefits explained to patient and patient's family at bedside who expressed understanding.   -titrate statin to LDL goal less than 70 - on atorvastatin 40 mg daily  -MRI Brain w/o as noted  -Dysphagia screen: pass   -Physical therapy/OT/Speech eval/treatment.     -CARDIOVASCULAR: TTE as noted with severely dilated LA. , cardiac monitoring w/ telemetry for now, further evaluation pending findings of noted workup                              -HEMATOLOGY: patient should have all age and risk appropriate malignancy screenings with PCP or sooner if clinically suspected   -Anemia workup per primary team, however appears to be resolved. Patient's family reports hx of iron deficiency anemia.      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  -Vascular consult appreciated who stated no surgical intervention at this time. Controlled with anti-hypertensives   -If plan for stent placement would need neurological clearance from the stroke standpoint  Na Goal:  135-145     Mott: N    ID: monitor for si/sx of infection     OTHER:  condition and plan of care d/w patient and family at bedside, questions and concerns addressed.   -Medication compliance strongly advised.  -Strict risk factor modifications    -Further care per primary team.     DISPOSITION: Rehab or home depending on PT eval once stable and workup is complete    CORE MEASURES:        Admission NIHSS: 6     Tenecteplase : [x] YES [] NO      LDL/HDL/A1C: 96/40/5.5     Depression Screen- if depression hx and/or present      Statin Therapy: Atorvastatin     Dysphagia Screen: [x] PASS [] FAIL     Smoking [] YES [x] NO      Afib [x] YES [] NO     Stroke Education [] YES [] NO Pending    Obtain screening lower extremity venous ultrasound in patients who meet 1 or more of the following criteria as patient is high risk for DVT/PE on admission:   [] History of DVT/PE  []Hypercoagulable states (Factor V Leiden, Cancer, OCP, etc. )  []Prolonged immobility (hemiplegia/hemiparesis/post operative or any other extended immobilization)  [] Transferred from outside facility (Rehab or Long term care)  [] Age </= to 50 ASSESSMENT: 89 y/o F with hx of Atrial Fibrillation, not on AC (patient choice), HTN, CHF, Prediabetes, who was transferred from [facility] for LM2 occlusion.  Patient presented yesterday to [facility] ED, LKW at 8:30AM, walked to bathroom, then  heard loud noise and found her with R sided weakness and aphasia.  CTH negative, CTA showed LM2 occlusion. Patient received TNK at 10:33 am and then transferred to [facility] for mechanical thrombectomy. During angiogram, she was found to have a distal LM4 occlusion, so no thrombectomy was performed.]    MRI brain w/out revealed patchy acute infarcts in the left cerebral hemisphere. Etiology likely cardioembolic in setting of hx of atrial fibrillation and not on anticoagulation     NEURO:   -Continue close monitoring for neurologic deterioration  -Neurologically with improvement. Aphasia improving, as well as RUE strength Right inferior temporal quadrantanopia appeared to be resolved.   -Patient is not allowed to drive. Must see neuro-ophthalmologists and get clearance from the optho standpoint to drive. Would also need neurology clearance after discharge due to RUE drift and weakness.   -Stroke neuro checks q 4h   -SBP goal normotension  -ANTITHROMBOTIC THERAPY: ASA 81mg for now. Repeat head CT 5/7 stable. Due to stable neurological exam and neuro imaging patient can be placed on Eliquis 5mg BID in setting of afib for further stroke prevention. At that point ASA 81mg daily is not needed from the neurological standpoint. If needed from the medical/cardiac standpoint patient is cleared from the neurological standpoint to be on ASA 81mg. Patient should monitor weights at home, as if patient gets to weight below 60kg would then consider to lower Eliquis dose. Risks and benefits explained to patient and patient's family at bedside who expressed understanding.   -titrate statin to LDL goal less than 70 - on atorvastatin 40 mg daily  -MRI Brain w/o as noted  -Dysphagia screen: pass   -Physical therapy/OT/Speech eval/treatment.     -CARDIOVASCULAR: TTE as noted with severely dilated LA. , cardiac monitoring w/ telemetry for now, further evaluation pending findings of noted workup                              -HEMATOLOGY: patient should have all age and risk appropriate malignancy screenings with PCP or sooner if clinically suspected   -Anemia workup per primary team, however appears to be resolved. Patient's family reports hx of iron deficiency anemia.      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  -Vascular consult appreciated who stated no surgical intervention at this time. Controlled with anti-hypertensives   -If plan for stent placement would need neurological clearance from the stroke standpoint  Na Goal:  135-145     Mott: N    ID: monitor for si/sx of infection     OTHER:  condition and plan of care d/w patient and family at bedside, questions and concerns addressed.   -Medication compliance strongly advised.  -Strict risk factor modifications    -Further care per primary team.     DISPOSITION: Rehab or home depending on PT eval once stable and workup is complete    CORE MEASURES:        Admission NIHSS: 6     Tenecteplase : [x] YES [] NO      LDL/HDL/A1C: 96/40/5.5     Depression Screen- if depression hx and/or present      Statin Therapy: Atorvastatin     Dysphagia Screen: [x] PASS [] FAIL     Smoking [] YES [x] NO      Afib [x] YES [] NO     Stroke Education [] YES [] NO Pending    Obtain screening lower extremity venous ultrasound in patients who meet 1 or more of the following criteria as patient is high risk for DVT/PE on admission:   [] History of DVT/PE  []Hypercoagulable states (Factor V Leiden, Cancer, OCP, etc. )  []Prolonged immobility (hemiplegia/hemiparesis/post operative or any other extended immobilization)  [] Transferred from outside facility (Rehab or Long term care)  [] Age </= to 50

## 2024-05-07 NOTE — PROGRESS NOTE ADULT - SUBJECTIVE AND OBJECTIVE BOX
Preliminary note, offical recommendations pending attending review/signature   [facility] Stroke Team  Progress Note     HPI:  89 y/o F with hx of Atrial Fibrillation, not on AC (patient choice), HTN, CHF, Prediabetes, who was transferred from [facility] for LM2 occlusion on 5/1/24. Patient presented yesterday to [facility] ED, LKW at 8:30AM, walked to bathroom, then  heard loud noise and found her with R sided weakness and aphasia.  CTH negative, CTA showed LM2 occlusion. Patient received TNK at 10:33 am and then transferred to [facility] for mechanical thrombectomy. During angiogram, she was found to have a distal LM4 occlusion, so no thrombectomy was performed.  Repeat CT head this AM, stable.      Per family: patient was not on AC by choice--they state she is sensitive to medications so she never wanted to start AC.      SUBJECTIVE: No events overnight.  No new neurologic complaints.  ROS reported negative unless otherwise noted.    acetaminophen     Tablet .. 650 milliGRAM(s) Oral every 6 hours PRN  amLODIPine   Tablet 5 milliGRAM(s) Oral daily  aspirin  chewable 81 milliGRAM(s) Oral daily  atorvastatin 40 milliGRAM(s) Oral at bedtime  chlorhexidine 2% Cloths 1 Application(s) Topical <User Schedule>  dextrose 50% Injectable 25 Gram(s) IV Push once  dextrose 50% Injectable 12.5 Gram(s) IV Push once  enoxaparin Injectable 40 milliGRAM(s) SubCutaneous every 24 hours  ferrous    sulfate 325 milliGRAM(s) Oral daily  furosemide    Tablet 40 milliGRAM(s) Oral daily  insulin lispro (ADMELOG) corrective regimen sliding scale   SubCutaneous every 6 hours  memantine 5 milliGRAM(s) Oral at bedtime  metoprolol succinate ER 50 milliGRAM(s) Oral daily  polyethylene glycol 3350 17 Gram(s) Oral daily  senna 2 Tablet(s) Oral at bedtime      PHYSICAL EXAM:   Vital Signs Last 24 Hrs  T(C): 36.4 (07 May 2024 08:10), Max: 36.8 (07 May 2024 00:15)  T(F): 97.5 (07 May 2024 08:10), Max: 98.2 (07 May 2024 00:15)  HR: 57 (07 May 2024 08:10) (57 - 67)  BP: 140/79 (07 May 2024 08:10) (140/79 - 163/75)  BP(mean): --  RR: 18 (07 May 2024 08:10) (18 - 18)  SpO2: 100% (07 May 2024 08:10) (97% - 100%)    Parameters below as of 07 May 2024 08:10  Patient On (Oxygen Delivery Method): room air      PENDING  General: No acute distress. Seen sitting in the chair eating.     Detailed Neurologic Exam:  Mental status: The patient is awake and alert and has normal attention span. She is oriented to name, month, and year. States that the place is [facility].  Able to name objects and function. Able to name color. Able to repeat and follow simple commands. Cannot follow two step commands. Mild comprehension disrupted.   Cranial nerves: Pupils equal and react symmetrically to light. Visual fields to confrontation intact. Extraocular motion is full with no nystagmus. There is no ptosis. Facial sensation is intact. Facial musculature is symmetric. Palate elevates symmetrically. Tongue is midline.  Motor: There is normal bulk and tone.  There is no tremor.  Strength is 5-/5 in the right arm, with mild pronator drift- improving and hand  is 4+/5, RLE is 5/5  Strength is 5/5 in the left arm and leg.  Sensation: Intact to light touch and pin in 4 extremities  Cerebellar: There is no dysmetria on finger to nose testing.  Gait : deferred       LABS:     05-02 Chol 154 LDL -- HDL 40<L> Trig 90    A1C: 5.5    IMAGING: Reviewed by me.       MR Head No Cont (05.04.24 @ 17:39)   IMPRESSION:  Patchy acute infarcts in the left cerebral hemisphere as discussed above.    Infarcts are noted to involve Broca's speech area and also the primary   motor and primary sensory cortices.  No hemorrhagic conversion.    TTE W or WO Ultrasound Enhancing Agent (05.03.24 @ 16:29)   CONCLUSIONS:   1. Left ventricular cavity is normal in size. Left ventricular wall thickness is normal. Left ventricular systolic function is normal with an ejection fraction visually estimated at 65 to 70 %.   2. Normal filling pressure. Analysis of left ventricular diastolic function and filling pressure is made challenging by the presence of atrial fibrillation.   3. Mildly enlarged right ventricular cavity size and borderline reduced systolic function.   4. The left atrium is severely dilated.   5. The right atrium is dilated.   6. Moderate aortic stenosis. Trace aortic regurgitation.   7. There is moderate calcification of the mitral valve annulus.   8. Estimated pulmonary artery systolic pressure is 50 mmHg, mild to moderate pulmonary hypertension.   9. Thickened mitral valve leaflets.  10. Mild mitral valve leaflet calcification.  11. Moderate mitral regurgitation.  12. Moderate tricuspid regurgitation.  13. Mild to moderate pulmonic regurgitation.  14. No pericardial effusion seen.  15. Compared to the transthoracic echocardiogram performed on 8/8/2023, there have been no significant interval changes.    CTA head and Neck and CTP 5/1/24  CT angiogram neck:  -No hemodynamically significant stenosis.    CT angiogram head:  -Occlusion of a left MCA proximal M2 branch.    CT perfusion:  -49 mL penumbra identified in the left MCA territory.  -No completed core infarct identified by perfusion analysis, although   this is likely underestimated as suspected infarct is seen on noncontrast   CT in the left anterior insula.      CT Brain Stroke Protocol (05.01.24 @ 09:58)   IMPRESSION:  Suspected infarct involving the left anterior insula.    No acute intracranial hemorrhage.    CT Head No Cont (05.02.24 @ 10:16)   Stable hypodensity involving the the inferior left frontal   lobe and left insular ribbon suggesting an acute infarction. No acute   intracranial hemorrhage. Consider MRI for further evaluation.    US Duplex Kidneys (05.03.24 @ 17:54)   IMPRESSION:  Significant stenosis at the left renal artery origin.     Preliminary note, offical recommendations pending attending review/signature   [facility] Stroke Team  Progress Note     HPI:  89 y/o F with hx of Atrial Fibrillation, not on AC (patient choice), HTN, CHF, Prediabetes, who was transferred from [facility] for LM2 occlusion on 5/1/24. Patient presented yesterday to [facility] ED, LKW at 8:30AM, walked to bathroom, then  heard loud noise and found her with R sided weakness and aphasia.  CTH negative, CTA showed LM2 occlusion. Patient received TNK at 10:33 am and then transferred to [facility] for mechanical thrombectomy. During angiogram, she was found to have a distal LM4 occlusion, so no thrombectomy was performed.  Repeat CT head this AM, stable.      Per family: patient was not on AC by choice--they state she is sensitive to medications so she never wanted to start AC.      SUBJECTIVE: No events overnight.  No new neurologic complaints.  ROS reported negative unless otherwise noted.    acetaminophen     Tablet .. 650 milliGRAM(s) Oral every 6 hours PRN  amLODIPine   Tablet 5 milliGRAM(s) Oral daily  aspirin  chewable 81 milliGRAM(s) Oral daily  atorvastatin 40 milliGRAM(s) Oral at bedtime  chlorhexidine 2% Cloths 1 Application(s) Topical <User Schedule>  dextrose 50% Injectable 25 Gram(s) IV Push once  dextrose 50% Injectable 12.5 Gram(s) IV Push once  enoxaparin Injectable 40 milliGRAM(s) SubCutaneous every 24 hours  ferrous    sulfate 325 milliGRAM(s) Oral daily  furosemide    Tablet 40 milliGRAM(s) Oral daily  insulin lispro (ADMELOG) corrective regimen sliding scale   SubCutaneous every 6 hours  memantine 5 milliGRAM(s) Oral at bedtime  metoprolol succinate ER 50 milliGRAM(s) Oral daily  polyethylene glycol 3350 17 Gram(s) Oral daily  senna 2 Tablet(s) Oral at bedtime      PHYSICAL EXAM:   Vital Signs Last 24 Hrs  T(C): 36.4 (07 May 2024 08:10), Max: 36.8 (07 May 2024 00:15)  T(F): 97.5 (07 May 2024 08:10), Max: 98.2 (07 May 2024 00:15)  HR: 57 (07 May 2024 08:10) (57 - 67)  BP: 140/79 (07 May 2024 08:10) (140/79 - 163/75)  BP(mean): --  RR: 18 (07 May 2024 08:10) (18 - 18)  SpO2: 100% (07 May 2024 08:10) (97% - 100%)    Parameters below as of 07 May 2024 08:10  Patient On (Oxygen Delivery Method): room air        General: No acute distress.     Detailed Neurologic Exam:  Mental status: The patient is awake and alert and has normal attention span. She is oriented to name, month, year, and place. Able to name objects and function. Able to name color. Able to repeat and follow simple commands. Cannot follow two step commands. Mild comprehension disrupted.  Cranial nerves: Pupils equal and react symmetrically to light. Visual fields to confrontation intact. Extraocular motion is full with no nystagmus. There is no ptosis. Facial sensation is intact. Facial musculature is symmetric. Palate elevates symmetrically. Tongue is midline.  Motor: There is normal bulk and tone.  There is no tremor.  Strength is 5-/5 in the right arm, with mild pronator drift- improving and hand  is 5-/5, RLE is 5/5  Strength is 5/5 in the left arm and leg.  Sensation: Intact to light touch and pin in 4 extremities  Cerebellar: There is no dysmetria on finger to nose testing.  Gait : deferred       LABS:     05-02 Chol 154 LDL -- HDL 40<L> Trig 90    A1C: 5.5    IMAGING: Reviewed by me.   CT Head No Cont (05.07.24 @ 10:24)   Impression: Evolving acute infarct as described above.    MR Head No Cont (05.04.24 @ 17:39)   IMPRESSION:  Patchy acute infarcts in the left cerebral hemisphere as discussed above.    Infarcts are noted to involve Broca's speech area and also the primary   motor and primary sensory cortices.  No hemorrhagic conversion.    TTE W or WO Ultrasound Enhancing Agent (05.03.24 @ 16:29)   CONCLUSIONS:   1. Left ventricular cavity is normal in size. Left ventricular wall thickness is normal. Left ventricular systolic function is normal with an ejection fraction visually estimated at 65 to 70 %.   2. Normal filling pressure. Analysis of left ventricular diastolic function and filling pressure is made challenging by the presence of atrial fibrillation.   3. Mildly enlarged right ventricular cavity size and borderline reduced systolic function.   4. The left atrium is severely dilated.   5. The right atrium is dilated.   6. Moderate aortic stenosis. Trace aortic regurgitation.   7. There is moderate calcification of the mitral valve annulus.   8. Estimated pulmonary artery systolic pressure is 50 mmHg, mild to moderate pulmonary hypertension.   9. Thickened mitral valve leaflets.  10. Mild mitral valve leaflet calcification.  11. Moderate mitral regurgitation.  12. Moderate tricuspid regurgitation.  13. Mild to moderate pulmonic regurgitation.  14. No pericardial effusion seen.  15. Compared to the transthoracic echocardiogram performed on 8/8/2023, there have been no significant interval changes.    CTA head and Neck and CTP 5/1/24  CT angiogram neck:  -No hemodynamically significant stenosis.    CT angiogram head:  -Occlusion of a left MCA proximal M2 branch.    CT perfusion:  -49 mL penumbra identified in the left MCA territory.  -No completed core infarct identified by perfusion analysis, although   this is likely underestimated as suspected infarct is seen on noncontrast   CT in the left anterior insula.      CT Brain Stroke Protocol (05.01.24 @ 09:58)   IMPRESSION:  Suspected infarct involving the left anterior insula.    No acute intracranial hemorrhage.    CT Head No Cont (05.02.24 @ 10:16)   Stable hypodensity involving the the inferior left frontal   lobe and left insular ribbon suggesting an acute infarction. No acute   intracranial hemorrhage. Consider MRI for further evaluation.    US Duplex Kidneys (05.03.24 @ 17:54)   IMPRESSION:  Significant stenosis at the left renal artery origin.       [facility] Stroke Team  Progress Note     HPI:  89 y/o F with hx of Atrial Fibrillation, not on AC (patient choice), HTN, CHF, Prediabetes, who was transferred from [facility] for LM2 occlusion on 5/1/24. Patient presented yesterday to [facility] ED, LKW at 8:30AM, walked to bathroom, then  heard loud noise and found her with R sided weakness and aphasia.  CTH negative, CTA showed LM2 occlusion. Patient received TNK at 10:33 am and then transferred to [facility] for mechanical thrombectomy. During angiogram, she was found to have a distal LM4 occlusion, so no thrombectomy was performed.  Repeat CT head this AM, stable.      Per family: patient was not on AC by choice--they state she is sensitive to medications so she never wanted to start AC.      SUBJECTIVE: No events overnight.  No new neurologic complaints.  ROS reported negative unless otherwise noted.    acetaminophen     Tablet .. 650 milliGRAM(s) Oral every 6 hours PRN  amLODIPine   Tablet 5 milliGRAM(s) Oral daily  aspirin  chewable 81 milliGRAM(s) Oral daily  atorvastatin 40 milliGRAM(s) Oral at bedtime  chlorhexidine 2% Cloths 1 Application(s) Topical <User Schedule>  dextrose 50% Injectable 25 Gram(s) IV Push once  dextrose 50% Injectable 12.5 Gram(s) IV Push once  enoxaparin Injectable 40 milliGRAM(s) SubCutaneous every 24 hours  ferrous    sulfate 325 milliGRAM(s) Oral daily  furosemide    Tablet 40 milliGRAM(s) Oral daily  insulin lispro (ADMELOG) corrective regimen sliding scale   SubCutaneous every 6 hours  memantine 5 milliGRAM(s) Oral at bedtime  metoprolol succinate ER 50 milliGRAM(s) Oral daily  polyethylene glycol 3350 17 Gram(s) Oral daily  senna 2 Tablet(s) Oral at bedtime      PHYSICAL EXAM:   Vital Signs Last 24 Hrs  T(C): 36.4 (07 May 2024 08:10), Max: 36.8 (07 May 2024 00:15)  T(F): 97.5 (07 May 2024 08:10), Max: 98.2 (07 May 2024 00:15)  HR: 57 (07 May 2024 08:10) (57 - 67)  BP: 140/79 (07 May 2024 08:10) (140/79 - 163/75)  BP(mean): --  RR: 18 (07 May 2024 08:10) (18 - 18)  SpO2: 100% (07 May 2024 08:10) (97% - 100%)    Parameters below as of 07 May 2024 08:10  Patient On (Oxygen Delivery Method): room air        General: No acute distress.     Detailed Neurologic Exam:  Mental status: The patient is awake and alert and has normal attention span. She is oriented to name, month, year, and place. Able to name objects and function. Able to name color. Able to repeat and follow simple commands. Cannot follow two step commands. Mild comprehension disrupted.  Cranial nerves: Pupils equal and react symmetrically to light. Visual fields to confrontation intact. Extraocular motion is full with no nystagmus. There is no ptosis. Facial sensation is intact. Facial musculature is symmetric. Palate elevates symmetrically. Tongue is midline.  Motor: There is normal bulk and tone.  There is no tremor.  Strength is 5-/5 in the right arm, with mild pronator drift- improving and hand  is 5-/5, RLE is 5/5  Strength is 5/5 in the left arm and leg.  Sensation: Intact to light touch and pin in 4 extremities  Cerebellar: There is no dysmetria on finger to nose testing.  Gait : deferred       LABS:     05-02 Chol 154 LDL -- HDL 40<L> Trig 90    A1C: 5.5    IMAGING: Reviewed by me.   CT Head No Cont (05.07.24 @ 10:24)   Impression: Evolving acute infarct as described above.    MR Head No Cont (05.04.24 @ 17:39)   IMPRESSION:  Patchy acute infarcts in the left cerebral hemisphere as discussed above.    Infarcts are noted to involve Broca's speech area and also the primary   motor and primary sensory cortices.  No hemorrhagic conversion.    TTE W or WO Ultrasound Enhancing Agent (05.03.24 @ 16:29)   CONCLUSIONS:   1. Left ventricular cavity is normal in size. Left ventricular wall thickness is normal. Left ventricular systolic function is normal with an ejection fraction visually estimated at 65 to 70 %.   2. Normal filling pressure. Analysis of left ventricular diastolic function and filling pressure is made challenging by the presence of atrial fibrillation.   3. Mildly enlarged right ventricular cavity size and borderline reduced systolic function.   4. The left atrium is severely dilated.   5. The right atrium is dilated.   6. Moderate aortic stenosis. Trace aortic regurgitation.   7. There is moderate calcification of the mitral valve annulus.   8. Estimated pulmonary artery systolic pressure is 50 mmHg, mild to moderate pulmonary hypertension.   9. Thickened mitral valve leaflets.  10. Mild mitral valve leaflet calcification.  11. Moderate mitral regurgitation.  12. Moderate tricuspid regurgitation.  13. Mild to moderate pulmonic regurgitation.  14. No pericardial effusion seen.  15. Compared to the transthoracic echocardiogram performed on 8/8/2023, there have been no significant interval changes.    CTA head and Neck and CTP 5/1/24  CT angiogram neck:  -No hemodynamically significant stenosis.    CT angiogram head:  -Occlusion of a left MCA proximal M2 branch.    CT perfusion:  -49 mL penumbra identified in the left MCA territory.  -No completed core infarct identified by perfusion analysis, although   this is likely underestimated as suspected infarct is seen on noncontrast   CT in the left anterior insula.      CT Brain Stroke Protocol (05.01.24 @ 09:58)   IMPRESSION:  Suspected infarct involving the left anterior insula.    No acute intracranial hemorrhage.    CT Head No Cont (05.02.24 @ 10:16)   Stable hypodensity involving the the inferior left frontal   lobe and left insular ribbon suggesting an acute infarction. No acute   intracranial hemorrhage. Consider MRI for further evaluation.    US Duplex Kidneys (05.03.24 @ 17:54)   IMPRESSION:  Significant stenosis at the left renal artery origin.

## 2024-05-07 NOTE — PROGRESS NOTE ADULT - SUBJECTIVE AND OBJECTIVE BOX
LAURI MENDEZ Patient is a 89y old  Female who presents with a chief complaint of stroke (07 May 2024 09:54)     HPI:  89F with PMH afib, HF, HTN who presents with stroke. Patient's last known well 8:30 am, found down by  this morning with R sided weakness and unable to speak.  She was taken to [facility], code stroke called. CTH negative, CTA showed LM2 occlusion. Patient received TNK at 10:33 am. She was then transferred to [facility] for mechanical thrombectomy. During angiogram, she was found to have a distal LM4 occlusion, so no thrombectomy was performed. She was extubated and admitted to NSICU for post TNK care.       The patient was seen and evaluated offer no complaints sitting in chair with  and daughter at bedside - discussed no further ACEI with renal artery stenosis and switch to amlodipine   The patient is in no acute distress.  Denied any fever chest pain, palpitations, shortness of breath, abdominal pain, fever, dysuria, cough, edema       I&O's Summary    06 May 2024 07:01  -  07 May 2024 07:00  --------------------------------------------------------  IN: 900 mL / OUT: 700 mL / NET: 200 mL    07 May 2024 07:01  -  07 May 2024 15:45  --------------------------------------------------------  IN: 760 mL / OUT: 0 mL / NET: 760 mL      Allergies    penicillins (Swelling)  penicillin (Unknown)  latex (Rash)    Intolerances      HEALTH ISSUES - PROBLEM Dx:        PAST MEDICAL & SURGICAL HISTORY:  H/O pleural effusion      HTN (hypertension)      Hyperlipidemia      Prediabetes      Basal cell carcinoma  and squamous cell carcinoma removed      Atrial fibrillation      History of total right hip replacement  left 2015              Vital Signs Last 24 Hrs  T(C): 36.6 (07 May 2024 12:50), Max: 36.8 (07 May 2024 00:15)  T(F): 97.9 (07 May 2024 12:50), Max: 98.2 (07 May 2024 00:15)  HR: 77 (07 May 2024 12:50) (57 - 77)  BP: 144/74 (07 May 2024 12:50) (140/79 - 160/76)  BP(mean): --  RR: 18 (07 May 2024 12:50) (18 - 18)  SpO2: 98% (07 May 2024 12:50) (97% - 100%)    Parameters below as of 07 May 2024 12:50  Patient On (Oxygen Delivery Method): room air    T(C): 36.6 (05-07-24 @ 12:50), Max: 36.8 (05-07-24 @ 00:15)  HR: 77 (05-07-24 @ 12:50) (57 - 77)  BP: 144/74 (05-07-24 @ 12:50) (140/79 - 160/76)  RR: 18 (05-07-24 @ 12:50) (18 - 18)  SpO2: 98% (05-07-24 @ 12:50) (97% - 100%)  Wt(kg): --    PHYSICAL EXAM:    GENERAL: NAD conversing pleasant   HEAD:  Atraumatic, Normocephalic  EYES: EOMI, PERRL, conjunctiva and sclera clear  ENMT:  Moist mucous membranes,  NERVOUS SYSTEM:  Alert & Oriented X3,  Moves upper and lower extremities; CNS-II-XII  CHEST/LUNG: Clear to auscultation bilaterally;   HEART: Regular rate and rhythm; No murmurs,   ABDOMEN: Soft, Nontender, Nondistended; Bowel sounds present  EXTREMITIES:  Peripheral Pulses, No  cyanosis, or edema  psychiatry- mood and affect appropriate, Insight and judgement intact     acetaminophen     Tablet .. 650 milliGRAM(s) Oral every 6 hours PRN  amLODIPine   Tablet 5 milliGRAM(s) Oral daily  apixaban 5 milliGRAM(s) Oral every 12 hours  aspirin  chewable 81 milliGRAM(s) Oral daily  atorvastatin 40 milliGRAM(s) Oral at bedtime  chlorhexidine 2% Cloths 1 Application(s) Topical <User Schedule>  dextrose 50% Injectable 12.5 Gram(s) IV Push once  dextrose 50% Injectable 25 Gram(s) IV Push once  enoxaparin Injectable 40 milliGRAM(s) SubCutaneous every 24 hours  ferrous    sulfate 325 milliGRAM(s) Oral daily  furosemide    Tablet 40 milliGRAM(s) Oral daily  insulin lispro (ADMELOG) corrective regimen sliding scale   SubCutaneous every 6 hours  memantine 5 milliGRAM(s) Oral at bedtime  metoprolol succinate ER 50 milliGRAM(s) Oral daily  polyethylene glycol 3350 17 Gram(s) Oral daily  senna 2 Tablet(s) Oral at bedtime          POCT Blood Glucose.: 85 mg/dL (07 May 2024 12:05)  POCT Blood Glucose.: 107 mg/dL (07 May 2024 05:12)  POCT Blood Glucose.: 137 mg/dL (07 May 2024 00:34)  POCT Blood Glucose.: 215 mg/dL (07 May 2024 00:31)  POCT Blood Glucose.: 144 mg/dL (06 May 2024 18:09)      RADIOLOGY & ADDITIONAL TESTS:      Consultant notes reviewed    Case discussed with consultant/provider/ family /patient

## 2024-05-07 NOTE — PROGRESS NOTE ADULT - NS ATTEND BILL GEN_ALL_CORE
Attending to bill
Postop Diagnosis: same

## 2024-05-08 ENCOUNTER — TRANSCRIPTION ENCOUNTER (OUTPATIENT)
Age: 89
End: 2024-05-08

## 2024-05-08 VITALS
SYSTOLIC BLOOD PRESSURE: 148 MMHG | OXYGEN SATURATION: 98 % | DIASTOLIC BLOOD PRESSURE: 86 MMHG | TEMPERATURE: 98 F | HEART RATE: 73 BPM | RESPIRATION RATE: 16 BRPM

## 2024-05-08 LAB
GLUCOSE BLDC GLUCOMTR-MCNC: 107 MG/DL — HIGH (ref 70–99)
GLUCOSE BLDC GLUCOMTR-MCNC: 96 MG/DL — SIGNIFICANT CHANGE UP (ref 70–99)

## 2024-05-08 PROCEDURE — C1769: CPT

## 2024-05-08 PROCEDURE — 97535 SELF CARE MNGMENT TRAINING: CPT

## 2024-05-08 PROCEDURE — 80061 LIPID PANEL: CPT

## 2024-05-08 PROCEDURE — C1757: CPT

## 2024-05-08 PROCEDURE — 82728 ASSAY OF FERRITIN: CPT

## 2024-05-08 PROCEDURE — 82962 GLUCOSE BLOOD TEST: CPT

## 2024-05-08 PROCEDURE — 99233 SBSQ HOSP IP/OBS HIGH 50: CPT

## 2024-05-08 PROCEDURE — C1894: CPT

## 2024-05-08 PROCEDURE — 82746 ASSAY OF FOLIC ACID SERUM: CPT

## 2024-05-08 PROCEDURE — 84466 ASSAY OF TRANSFERRIN: CPT

## 2024-05-08 PROCEDURE — 85025 COMPLETE CBC W/AUTO DIFF WBC: CPT

## 2024-05-08 PROCEDURE — C1760: CPT

## 2024-05-08 PROCEDURE — 80053 COMPREHEN METABOLIC PANEL: CPT

## 2024-05-08 PROCEDURE — 61645 PERQ ART M-THROMBECT &/NFS: CPT

## 2024-05-08 PROCEDURE — 80048 BASIC METABOLIC PNL TOTAL CA: CPT

## 2024-05-08 PROCEDURE — 99239 HOSP IP/OBS DSCHRG MGMT >30: CPT

## 2024-05-08 PROCEDURE — 83036 HEMOGLOBIN GLYCOSYLATED A1C: CPT

## 2024-05-08 PROCEDURE — 36415 COLL VENOUS BLD VENIPUNCTURE: CPT

## 2024-05-08 PROCEDURE — 83735 ASSAY OF MAGNESIUM: CPT

## 2024-05-08 PROCEDURE — 97530 THERAPEUTIC ACTIVITIES: CPT

## 2024-05-08 PROCEDURE — 70450 CT HEAD/BRAIN W/O DYE: CPT | Mod: MC

## 2024-05-08 PROCEDURE — 82607 VITAMIN B-12: CPT

## 2024-05-08 PROCEDURE — 85027 COMPLETE CBC AUTOMATED: CPT

## 2024-05-08 PROCEDURE — 83540 ASSAY OF IRON: CPT

## 2024-05-08 PROCEDURE — 84443 ASSAY THYROID STIM HORMONE: CPT

## 2024-05-08 PROCEDURE — 84100 ASSAY OF PHOSPHORUS: CPT

## 2024-05-08 PROCEDURE — 70551 MRI BRAIN STEM W/O DYE: CPT | Mod: MC

## 2024-05-08 PROCEDURE — 93306 TTE W/DOPPLER COMPLETE: CPT

## 2024-05-08 PROCEDURE — 97163 PT EVAL HIGH COMPLEX 45 MIN: CPT

## 2024-05-08 PROCEDURE — 97116 GAIT TRAINING THERAPY: CPT

## 2024-05-08 PROCEDURE — C1887: CPT

## 2024-05-08 PROCEDURE — 83550 IRON BINDING TEST: CPT

## 2024-05-08 PROCEDURE — 93975 VASCULAR STUDY: CPT

## 2024-05-08 RX ORDER — ASPIRIN/CALCIUM CARB/MAGNESIUM 324 MG
1 TABLET ORAL
Qty: 0 | Refills: 0 | DISCHARGE

## 2024-05-08 RX ORDER — APIXABAN 2.5 MG/1
1 TABLET, FILM COATED ORAL
Qty: 0 | Refills: 0 | DISCHARGE
Start: 2024-05-08

## 2024-05-08 RX ORDER — INSULIN LISPRO 100/ML
0 VIAL (ML) SUBCUTANEOUS
Qty: 0 | Refills: 0 | DISCHARGE
Start: 2024-05-08

## 2024-05-08 RX ORDER — SENNA PLUS 8.6 MG/1
2 TABLET ORAL
Qty: 0 | Refills: 0 | DISCHARGE
Start: 2024-05-08

## 2024-05-08 RX ORDER — FUROSEMIDE 40 MG
2 TABLET ORAL
Refills: 0 | DISCHARGE

## 2024-05-08 RX ORDER — METOPROLOL TARTRATE 50 MG
1 TABLET ORAL
Refills: 0 | DISCHARGE

## 2024-05-08 RX ORDER — RAMIPRIL 5 MG
1 CAPSULE ORAL
Qty: 0 | Refills: 0 | DISCHARGE

## 2024-05-08 RX ORDER — ATORVASTATIN CALCIUM 80 MG/1
1 TABLET, FILM COATED ORAL
Qty: 0 | Refills: 0 | DISCHARGE
Start: 2024-05-08

## 2024-05-08 RX ORDER — POLYETHYLENE GLYCOL 3350 17 G/17G
17 POWDER, FOR SOLUTION ORAL
Qty: 0 | Refills: 0 | DISCHARGE
Start: 2024-05-08

## 2024-05-08 RX ORDER — MEMANTINE HYDROCHLORIDE 10 MG/1
1 TABLET ORAL
Qty: 0 | Refills: 0 | DISCHARGE
Start: 2024-05-08

## 2024-05-08 RX ORDER — AMLODIPINE BESYLATE 2.5 MG/1
1 TABLET ORAL
Qty: 0 | Refills: 0 | DISCHARGE
Start: 2024-05-08

## 2024-05-08 RX ORDER — METOPROLOL TARTRATE 50 MG
1 TABLET ORAL
Qty: 0 | Refills: 0 | DISCHARGE
Start: 2024-05-08

## 2024-05-08 RX ORDER — FUROSEMIDE 40 MG
1 TABLET ORAL
Qty: 0 | Refills: 0 | DISCHARGE
Start: 2024-05-08

## 2024-05-08 RX ADMIN — Medication 50 MILLIGRAM(S): at 08:57

## 2024-05-08 RX ADMIN — Medication 40 MILLIGRAM(S): at 05:55

## 2024-05-08 RX ADMIN — AMLODIPINE BESYLATE 5 MILLIGRAM(S): 2.5 TABLET ORAL at 05:55

## 2024-05-08 RX ADMIN — APIXABAN 5 MILLIGRAM(S): 2.5 TABLET, FILM COATED ORAL at 08:56

## 2024-05-08 RX ADMIN — CHLORHEXIDINE GLUCONATE 1 APPLICATION(S): 213 SOLUTION TOPICAL at 05:55

## 2024-05-08 NOTE — PROGRESS NOTE ADULT - PROVIDER SPECIALTY LIST ADULT
Neurology
Physiatry
Rehab Medicine
Hospitalist
Internal Medicine
Rehab Medicine
Vascular Surgery
Hospitalist
Hospitalist
Internal Medicine
Neurology
Hospitalist

## 2024-05-08 NOTE — PROGRESS NOTE ADULT - SUBJECTIVE AND OBJECTIVE BOX
Patient sitting OOB and daughter at bedside.  Pending DC to [facility] today.    FUNCTIONAL PROGRESS  5/7 OT  Bed-Chair Transfer Training  Transfer Training Bed-to-Chair Transfer: minimum assist (75% patient effort);  rolling walker  Transfer Training Chair-to-Bed Transfer: minimum assist (75% patient effort);  rolling walker    Sit-Stand Transfer Training  Transfer Training Sit-to-Stand Transfer: minimum assist (75% patient effort);  1 person assist;  rolling walker  Transfer Training Stand-to-Sit Transfer: minimum assist (75% patient effort);  1 person assist;  rolling walker    Toilet Transfer Training  Transfer Training Toilet Transfer: minimum assist (75% patient effort);  1 person assist;  rolling walker    Lower Body Dressing Training  Lower Body Dressing Training Assistance: minimum assist (75% patient effort)    Upper Body Dressing Training  Upper Body Dressing Training Assistance: minimum assist (75% patient effort)      VITALS  T(C): 36.6 (05-08-24 @ 12:00), Max: 37 (05-08-24 @ 05:53)  HR: 73 (05-08-24 @ 12:00) (55 - 79)  BP: 148/86 (05-08-24 @ 12:00) (125/60 - 180/73)  RR: 16 (05-08-24 @ 12:00) (16 - 19)  SpO2: 98% (05-08-24 @ 12:00) (95% - 100%)  Wt(kg): --    MEDICATIONS   acetaminophen     Tablet .. 650 milliGRAM(s) every 6 hours PRN  amLODIPine   Tablet 5 milliGRAM(s) daily  apixaban 5 milliGRAM(s) every 12 hours  atorvastatin 40 milliGRAM(s) at bedtime  chlorhexidine 2% Cloths 1 Application(s) <User Schedule>  dextrose 50% Injectable 25 Gram(s) once  dextrose 50% Injectable 12.5 Gram(s) once  ferrous    sulfate 325 milliGRAM(s) daily  furosemide    Tablet 40 milliGRAM(s) daily  insulin lispro (ADMELOG) corrective regimen sliding scale   every 6 hours  memantine 5 milliGRAM(s) at bedtime  metoprolol succinate ER 50 milliGRAM(s) daily  polyethylene glycol 3350 17 Gram(s) daily  senna 2 Tablet(s) at bedtime      RECENT LABS/IMAGING  - Reviewed Today                    CT Head 5/2 - Stable hypodensity involving the the inferior left frontal lobe and left insular ribbon suggesting an acute infarction. No acute intracranial hemorrhage. Consider MRI for further evaluation.    CTA HEAD - Occlusion of a left MCA proximal M2 branch.    CT perfusion 5/1 - -49 mL penumbra identified in the left MCA territory. -No completed core infarct identified by perfusion analysis, although this is likely underestimated as suspected infarct is seen on noncontrast  CT in the left anterior insula.    MR Head 5/4 - Patchy acute infarcts in the left cerebral hemisphere as discussed above.  Infarcts are noted to involve Broca's speech area and also the primary motor and primary sensory cortices.  No hemorrhagic conversion.    HEAD CT 5/7 - Evolving acute infarct as described above.    ----------------------------------------------------------------------------------------  PHYSICAL EXAM  Constitutional - NAD, Comfortable  Extremities - No edema  Neurologic Exam -                    Cognitive - AAOx self, situation, year (with choices)     Communication - Expressive deficits     Motor - Left UE paresis     Sensory - Intact to LT  Psychiatric - Mood stable, Affect WNL  ----------------------------------------------------------------------------------------  ASSESSMENT/PLAN  89yFemale with functional deficits after devleoping an acute CVA  Acute left CVA - Eliquis, Lipitor  CHF/HTN - Norvasc, Lasix, Toprol  Expressive Aphasia - Namenda 5mg HS - RECOMMEND INCREASE to BID  Pain - Tylenol  DVT PPX - SCDs  Rehab/Impaired mobility and function - Patient continues to require hospitalization for the above diagnoses and ongoing active management of comorbid complications that are substantially impairing functional ability and impairing quality of life necessitating ongoing active medical management by a physician of these complications, necessitating acute rehab.     When medically optimized, based on the patient's diagnosis, current functional status and potential for progress, recommend ACUTE inpatient rehabilitation for the functional deficits consisting of 3 hours of therapy/day & 24 hour RN/daily PMR physician for comorbid medical management. Patient will be able to tolerate 3 hours a day.       Will continue to follow. Rehab recommendations are dependent on how functional progress changes as well as how patient continues to participate and tolerate therapeutic interventions, WHICH MAY CHANGE UPON FOLLOW UP EVALUATIONS. Recommend ongoing mobilization by staff to maintain cardiopulmonary function and prevention of secondary complications related to debility. Discussed the specific management and recommendations above with rehab clinical care team/rehab liaison.

## 2024-05-09 ENCOUNTER — NON-APPOINTMENT (OUTPATIENT)
Age: 89
End: 2024-05-09

## 2024-05-14 ENCOUNTER — APPOINTMENT (OUTPATIENT)
Dept: GERIATRICS | Facility: CLINIC | Age: 89
End: 2024-05-14

## 2024-05-18 NOTE — ED ADULT TRIAGE NOTE - TEMPERATURE IN FAHRENHEIT (DEGREES F)
99
Incision and Drainage  Incision and drainage is a procedure to open and drain a fluid-filled sac. The sac may be filled with pus, mucus, or blood. Sacs that may need to be drained include cysts, skin infections (abscesses), and red lumps called boils that form from a small abscess or after a cyst bursts.    You may need this procedure if you have a fluid-filled sac that is large, painful, infected, or not healing well.    Tell a health care provider about:  Any allergies you have.  All medicines you are taking, including vitamins, herbs, eye drops, creams, and over-the-counter medicines.  Any problems you or family members have had with anesthesia.  Any bleeding problems you have.  Any surgeries you have had.  Any medical conditions you have.  Any medical implants you have in your body. This includes a pacemaker or joint replacement.  What are the risks?  Your health care provider will talk with you about risks. These may include:  Infection.  Bleeding.  Allergic reactions to medicines.  Scarring.  The return of the cyst or abscess.  Damage to nerves or blood vessels.  What happens before the procedure?  Medicine    Ask your provider about:  Changing or stopping your regular medicines. These include any diabetes medicines or blood thinners you take.  Taking medicines such as aspirin and ibuprofen. These medicines can thin your blood. Do not take them unless your provider tells you to.  Taking over-the-counter medicines, vitamins, herbs, and supplements.  You may need to start taking antibiotics before your procedure.  Tests    You may have tests. These may include:  Imaging tests, such as an X-ray or ultrasound. These may be done to see how large or deep the fluid-filled sac is.  Blood tests.  Electrocardiogram (ECG). This test checks your heart rhythm.  Surgery safety    Ask your provider:  How your surgery site will be marked.  What steps will be taken to help prevent infection. These steps may include:  Removing hair at the surgery site.  Washing skin with a soap that kills germs.  Receiving antibiotics.  General instructions    Follow instructions from your provider about what you may eat and drink.  Do not use any products that contain nicotine or tobacco for at least 4 weeks before the procedure. These products include cigarettes, chewing tobacco, and vaping devices, such as e-cigarettes. If you need help quitting, ask your provider.  You may need to get a tetanus shot.  If you will be going home right after the procedure, plan to have a responsible adult:  Take you home from the hospital or clinic. You will not be allowed to drive.  Care for you for the time you are told.  What happens during the procedure?  A skin abscess before and after an incision to drain the fluid from it.  An IV may be inserted into one of your veins.  You may be given:  A sedative. This helps you relax.  Anesthesia. This keeps you from feeling pain. It will numb certain areas of your body.  An incision will be made in the top of the fluid-filled sac.  Pus, blood, and mucus will be squeezed out. A syringe or tube (drain) may be used to empty more fluid from the sac.  The inside of the sac may be washed out (irrigated) with a germ-free solution.  Depending on the size of your abscess:  The drain may be left in place for a few weeks.  The edges of the incision may be stitched open to make a long-term opening for drainage.  A gauze packing may be placed inside the incision.  A sample of the drainage fluid may be taken to do a culture. This is when the sample is checked for bacteria.  The incision may be covered with a bandage (dressing).  The procedure may vary among providers and hospitals.    What happens after the procedure?  Your blood pressure, heart rate, breathing rate, and blood oxygen level will be monitored until you leave the hospital or clinic.  You will be given medicine for pain.  You will be watched for any signs of infection.  If you were given a sedative during the procedure, it can affect you for several hours. Do not drive or operate machinery until your provider says that it is safe.  This information is not intended to replace advice given to you by your health care provider. Make sure you discuss any questions you have with your health care provider.    Skin Abscess  Close-up of an abscess on the skin.  A skin abscess is an infected area on or under your skin. It contains pus and other material. An abscess may also be called a furuncle, carbuncle, or boil. It is often the result of an infection caused by bacteria. An abscess can occur in or on almost any part of your body.    Sometimes, an abscess may break open (rupture) on its own. In most cases, it will keep getting worse unless it is treated. An abscess can cause pain and make you feel ill. An untreated abscess can cause infection to spread to other parts of your body or your bloodstream. The abscess may need to be drained. You may also need to take antibiotics.    What are the causes?  An abscess occurs when germs, like bacteria, pass through your skin and cause an infection. This may be caused by:  A scrape or cut on your skin.  A puncture wound through your skin, such as a needle injection or insect bite.  Blocked oil or sweat glands.  Blocked and infected hair follicles.  A fluid-filled sac that forms beneath your skin (sebaceous cyst) and becomes infected.  What increases the risk?  You may be more likely to develop an abscess if:  You have problems with blood circulation, or you have a weak body defense system (immune system).  You have diabetes.  You have dry and irritated skin.  You get injections often or use IV drugs.  You have a foreign body in a wound, such as a splinter.  You smoke or use tobacco products.  What are the signs or symptoms?  Symptoms of this condition include:  A painful, firm bump under the skin.  A bump with pus at the top. This may break through the skin and drain.  Other symptoms include:  Redness and swelling around the abscess.  Warmth or tenderness.  Swelling of the lymph nodes (glands) near the abscess.  A sore on the skin.  How is this diagnosed?  This condition may be diagnosed based on a physical exam and your medical history. You may also have tests done, such as:  A test of a sample of pus. This may be done to find what is causing the infection.  Blood tests.  Imaging tests, such as an ultrasound, CT scan, or MRI.  How is this treated?  A small abscess that drains on its own may not need to be treated. Treatment for larger abscesses may include:  Moist heat or a heat pack applied to the area a few times a day.  Incision and drainage. This is a procedure to drain the abscess.  Antibiotics. For a severe abscess, you may first get antibiotics through an IV and then change to antibiotics by mouth.  Follow these instructions at home:  Medicines    Take over-the-counter and prescription medicines only as told by your provider.  If you were prescribed antibiotics, take them as told by your provider. Do not stop using the antibiotic even if you start to feel better.  Abscess care    Washing hands with soap and water.  If you have an abscess that has not drained, apply heat to the affected area. Use the heat source that your provider recommends, such as a moist heat pack or a heating pad.  Place a towel between your skin and the heat source.  Leave the heat on for 20–30 minutes at a time.  If your skin turns bright red, remove the heat right away to prevent burns. The risk of burns is higher if you cannot feel pain, heat, or cold.  Follow instructions from your provider about how to take care of your abscess. Make sure you:  Cover the abscess with a bandage (dressing).  Wash your hands with soap and water for at least 20 seconds before and after you change the dressing or gauze. If soap and water are not available, use hand .  Change your dressing or gauze as told by your provider.  Check your abscess every day for signs of an infection that is getting worse. Check for:  More redness, swelling, pain, or tenderness.  More fluid or blood.  Warmth.  More pus or a worse smell.  General instructions    To avoid spreading the infection:  Do not share personal care items, towels, or hot tubs with others.  Avoid making skin contact with other people.  Be careful when getting rid of used dressings, wound packing, or any drainage from the abscess.  Do not use any products that contain nicotine or tobacco. These products include cigarettes, chewing tobacco, and vaping devices, such as e-cigarettes. If you need help quitting, ask your provider.  Do not use any creams, ointments, or liquids unless you have been told to by your provider.  Contact a health care provider if:  You see redness that spreads quickly or red streaks on your skin spreading away from the abscess.  You have any signs of worse infection at the abscess.  You vomit every time you eat or drink.  You have a fever, chills, or muscle aches.  The cyst or abscess returns.  Get help right away if:  You have severe pain.  You make less pee (urine) than normal.  This information is not intended to replace advice given to you by your health care provider. Make sure you discuss any questions you have with your health care provider.

## 2024-05-22 ENCOUNTER — NON-APPOINTMENT (OUTPATIENT)
Age: 89
End: 2024-05-22

## 2024-05-22 RX ORDER — VALSARTAN 80 MG/1
80 TABLET, COATED ORAL
Qty: 30 | Refills: 1 | Status: DISCONTINUED | COMMUNITY
Start: 2024-03-27 | End: 2024-05-22

## 2024-05-22 RX ORDER — RAMIPRIL 10 MG/1
10 CAPSULE ORAL
Qty: 90 | Refills: 0 | Status: DISCONTINUED | COMMUNITY
Start: 2019-07-17 | End: 2024-05-22

## 2024-05-28 ENCOUNTER — APPOINTMENT (OUTPATIENT)
Dept: GERIATRICS | Facility: CLINIC | Age: 89
End: 2024-05-28
Payer: MEDICARE

## 2024-05-28 VITALS
SYSTOLIC BLOOD PRESSURE: 184 MMHG | HEIGHT: 61 IN | DIASTOLIC BLOOD PRESSURE: 70 MMHG | BODY MASS INDEX: 26.06 KG/M2 | TEMPERATURE: 97 F | WEIGHT: 138 LBS | OXYGEN SATURATION: 98 % | HEART RATE: 67 BPM

## 2024-05-28 DIAGNOSIS — E78.5 HYPERLIPIDEMIA, UNSPECIFIED: ICD-10-CM

## 2024-05-28 DIAGNOSIS — I48.91 UNSPECIFIED ATRIAL FIBRILLATION: ICD-10-CM

## 2024-05-28 DIAGNOSIS — I63.9 CEREBRAL INFARCTION, UNSPECIFIED: ICD-10-CM

## 2024-05-28 DIAGNOSIS — F09 UNSPECIFIED MENTAL DISORDER DUE TO KNOWN PHYSIOLOGICAL CONDITION: ICD-10-CM

## 2024-05-28 DIAGNOSIS — K21.9 GASTRO-ESOPHAGEAL REFLUX DISEASE W/OUT ESOPHAGITIS: ICD-10-CM

## 2024-05-28 PROCEDURE — G2211 COMPLEX E/M VISIT ADD ON: CPT

## 2024-05-28 PROCEDURE — 99215 OFFICE O/P EST HI 40 MIN: CPT | Mod: GC

## 2024-05-28 RX ORDER — FUROSEMIDE 40 MG/1
40 TABLET ORAL
Qty: 90 | Refills: 0 | Status: ACTIVE | COMMUNITY
Start: 2023-08-15 | End: 1900-01-01

## 2024-05-28 RX ORDER — METOPROLOL SUCCINATE 50 MG/1
50 TABLET, EXTENDED RELEASE ORAL DAILY
Qty: 90 | Refills: 0 | Status: ACTIVE | COMMUNITY
Start: 2019-07-17 | End: 1900-01-01

## 2024-05-28 RX ORDER — ATORVASTATIN CALCIUM 40 MG/1
40 TABLET, FILM COATED ORAL
Qty: 90 | Refills: 0 | Status: ACTIVE | COMMUNITY
Start: 1900-01-01 | End: 1900-01-01

## 2024-05-28 RX ORDER — AMLODIPINE BESYLATE 10 MG/1
10 TABLET ORAL
Qty: 90 | Refills: 0 | Status: ACTIVE | COMMUNITY
Start: 1900-01-01 | End: 1900-01-01

## 2024-05-28 RX ORDER — APIXABAN 5 MG/1
5 TABLET, FILM COATED ORAL
Qty: 180 | Refills: 1 | Status: ACTIVE | COMMUNITY
Start: 1900-01-01 | End: 1900-01-01

## 2024-05-28 RX ORDER — PANTOPRAZOLE 40 MG/1
40 TABLET, DELAYED RELEASE ORAL DAILY
Qty: 90 | Refills: 0 | Status: ACTIVE | COMMUNITY
Start: 2024-05-28 | End: 1900-01-01

## 2024-05-29 LAB
ANION GAP SERPL CALC-SCNC: 21 MMOL/L
BASOPHILS # BLD AUTO: 0.02 K/UL
BASOPHILS NFR BLD AUTO: 0.3 %
BUN SERPL-MCNC: 29 MG/DL
CALCIUM SERPL-MCNC: 9.4 MG/DL
CHLORIDE SERPL-SCNC: 102 MMOL/L
CO2 SERPL-SCNC: 19 MMOL/L
CREAT SERPL-MCNC: 1.11 MG/DL
EGFR: 47 ML/MIN/1.73M2
EOSINOPHIL # BLD AUTO: 0.12 K/UL
EOSINOPHIL NFR BLD AUTO: 1.9 %
GLUCOSE SERPL-MCNC: 48 MG/DL
HCT VFR BLD CALC: 41.4 %
HGB BLD-MCNC: 13.3 G/DL
IMM GRANULOCYTES NFR BLD AUTO: 0.3 %
LYMPHOCYTES # BLD AUTO: 1.19 K/UL
LYMPHOCYTES NFR BLD AUTO: 18.7 %
MAN DIFF?: NORMAL
MCHC RBC-ENTMCNC: 29.7 PG
MCHC RBC-ENTMCNC: 32.1 GM/DL
MCV RBC AUTO: 92.4 FL
MONOCYTES # BLD AUTO: 0.57 K/UL
MONOCYTES NFR BLD AUTO: 8.9 %
NEUTROPHILS # BLD AUTO: 4.45 K/UL
NEUTROPHILS NFR BLD AUTO: 69.9 %
PLATELET # BLD AUTO: 142 K/UL
POTASSIUM SERPL-SCNC: 4.8 MMOL/L
RBC # BLD: 4.48 M/UL
RBC # FLD: 14.6 %
SODIUM SERPL-SCNC: 142 MMOL/L
WBC # FLD AUTO: 6.37 K/UL

## 2024-05-29 RX ORDER — MEMANTINE HYDROCHLORIDE 5 MG/1
5 TABLET, FILM COATED ORAL DAILY
Qty: 90 | Refills: 0 | Status: ACTIVE | COMMUNITY
Start: 1900-01-01 | End: 1900-01-01

## 2024-05-29 NOTE — PHYSICAL EXAM
[Alert] : alert [Normal Outer Ear/Nose] : the ears and nose were normal in appearance [Normal Appearance] : the appearance of the neck was normal [Supple] : the neck was supple [No Respiratory Distress] : no respiratory distress [No Acc Muscle Use] : no accessory muscle use [Respiration, Rhythm And Depth] : normal respiratory rhythm and effort [Auscultation Breath Sounds / Voice Sounds] : lungs were clear to auscultation bilaterally [___ +] : bilateral [unfilled]+ pitting edema to the ankles [Bowel Sounds] : normal bowel sounds [Abdomen Tenderness] : non-tender [Abdomen Soft] : soft [No Spinal Tenderness] : no spinal tenderness [Normal Color / Pigmentation] : normal skin color and pigmentation [Normal Turgor] : normal skin turgor [Normal Affect] : the affect was normal [Normal Mood] : the mood was normal [de-identified] :  JESSY 4-5/6 [de-identified] : gait instability with small steps, Rt. side minimal weakness compared to Lt

## 2024-05-29 NOTE — DATA REVIEWED
[FreeTextEntry1] : Reviewed inpatient hospital records in detail Reviewed inpatient neurology and cardiology notes Reviewed most recent labs from May 4: CBC with white count 6.47, hemoglobin 12.3 stable, platelets 119 downtrending BMP: Electrolytes unremarkable with creatinine 0.9 at baseline TTE: EF 65 to 70%, moderate MR and TR, no change from prior TTE August 2023, no evidence of PFO

## 2024-05-29 NOTE — REASON FOR VISIT
[Follow-Up] : a follow-up visit [Spouse] : spouse [Post Hospitalization] : a post hospitalization visit [Family Member] : family member [FreeTextEntry3] : daughter

## 2024-05-29 NOTE — END OF VISIT
[] : Fellow [FreeTextEntry3] : 89 year old woman w/ PMH as above presents for TCM visit status post hospitalization for acute left MCA stroke May 2024.  Patient presented to the hospital with aphasia and right-sided weakness after being found down in the bathroom by her .  She was given DKA and transferred to [facility] for consideration of mechanical thrombectomy.  Procedure was aborted after discovery of distal L M4 occlusion in addition to L M2 occlusion.  Patient was started on Eliquis.  During hospitalization she was also found to have renal artery stenosis.  ACE inhibitor and ARB have been discontinued.  Amlodipine was uptitrated to 10 mg daily.  Her home blood pressures have reportedly been better controlled and her systolic blood pressure is 130 on the visit today.  She was discharged from rehab 5 days ago.  She is accompanied by her  and her daughter today, who is visiting from [place].  Patient has home care services through [facility].  She remarkably has minimal residual deficits.  She has 5 out of 5 strength in the right upper extremity and right lower extremity.  She has minimal aphasia.  There is no evidence of confusion or delirium.  Extended discussion today regarding overall prognosis and neck steps.  Patient is currently not driving and I recommended discontinue until she stabilizes.  Met with  today for discussion of transportation options and home care options.  We will complete a [employer] transportation application form.   Patient is scheduled for neurology follow-up on June 5.  She will also follow-up with her cardiologist, who is aware of her recent admission.  No changes to medications today.  We will check labs, including CBC and BMP.   Plan of care was discussed with the patient and her  and daughter in detail.  She will follow-up with us in 2 months.

## 2024-05-29 NOTE — HISTORY OF PRESENT ILLNESS
[One fall no injury in past year] : Patient reported one fall in the past year without injury [Patient is independent with] : bathing [] : managing medications [Independent] : managing finances [Cane] : cane [Grab Bars] : grab bars [Shower Chair] : shower chair [Night Light] : night light [0] : 2) Feeling down, depressed, or hopeless: Not at all (0) [Designated Healthcare Proxy] : Designated healthcare proxy [FreeTextEntry1] : Ms. Melissa Medina is a 89-year-old female with PMHx of  HTN, A-fib, AS, CHF/pleural effusion, RLL lung nodule, Fhx of early colon cancer and stomach cancer presenting to [facility] for post-discharge follow-up.  She was in her usual health condition until , when she developed cardiopulmonary symptoms. Her family history is significant for her father, who  of colon cancer at age 52( dx at the age of 51?), and her sister, who  of stomach cancer (Dxed at the age of 29), and multiple family members with Parkinson's disease. 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 She is living with her  Sam. She was enjoying life fishing. She was fully functional in ADLs/IADLs without any cognitive dysfunction.   A month ago, she collapsed in the bathroom and was subsequently found by her  who noticed her slurred speech and Rt. side weakness. He gave her aspirin two times and called EMS. She was taken to [facility], where CT-head was negative and CT-Angio showed LM2 occlusion. She received tenecteplase(TNK), then transferred to [facility] for mechanical thrombectomy, which was aborted after finding a distal LM4 occlusion during an angiogram. She was transferred to NSICU for post-TNK care. Repeated CT-head showed hypodensity involving the inferior frontal lobe and Lt. insular ribbon suggesting an acute infarction. MRI confirmed acute infarcts in the left cerebral hemisphere. She was also found to have renal artery stenosis(VLAD) and Ramipril was discontinued. Vascular deferred intervention for VLAD because BP was being stabilized besides high operative risk. She was transferred to [facility] for acute inpatient rehab. She presented today for a post-discharge follow-up from rehab. She recovers almost to her baseline. She has minimal Rt. side weakness, but otherwise she is back to her normal baseline motor/sensory function.           [TextBox_25] : 5 years ago [TextBox_31] : no heaering problem  [TextBox_37] : f/u for cataract [TextBox_43] : goes to dental regularly [TextBox_19] : 10 years ago [FreeTextEntry2] : Dermatology follow up s/p atypical skin lesion [Driving Concerns] : not driving or driving without noted concerns [[facility]] : >12 sec [FreeTextEntry4] : Sam Chung

## 2024-05-29 NOTE — ASSESSMENT
[FreeTextEntry1] : Ms. Melissa Medina is a 89 years old female with PMHx of  HTN, A-fib, AS, CHF/pleural effusion, RLL lung nodule, Fhx of early colon cancer and stomach cancer presenting to [facility] for [place]-PCP care. 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 The utility of colonoscopy at her age is not clear, either.  She had a recent Lt. hemispheric infarct most probably due to cardioembolic cause in the setting of A-fib without anticoagulant. Eliquis was started during the hospitalization. Her BP today is 130/70 with amlodipine, metoprolol, and Furosemide. She is scheduled for a follow-up with Neurology and Cardiology. She used to be the only one who drove in the household and hopes to be able to go back to driving in the future. Meanwhile, the family wants help to find transportation.   Physical examination is notable for minimally decreased Rt. hand , gait instability, JESSY 3-4/6, and B/L lower leg swelling (+/+) with chronic venous stasis.     " Pulmo note appreciated. CT-Chest is to be repeated during the summer.  "    -Check CBC, CMP today. -Continue Eliquis. -Refer to Vascular for renal artery stenosis follow-up. -Avoid salty food -Continue f/u with Cardio/Pulmo/Neuro -Discuss D/C memantine next visit to reduce polypharmacy.  -HCP form filled out and supposed to be scanned previously; Her  Sam -PT referred previously -Refer to [facility] for transportation and home care  -Health maintenance RSV  2023  flu 2023 covid 2023 [id number]  2024 DEXA 12/2023 osteopenia   RTC 1-2 month Addendum: Lab reviewed. Glucose 48. Could be due to delayed centrifugation. Her  has glucometer. Advised to check AM glucose and check when she feels dizzy and call us back if abnormal. F/u Platelet next visit.  Continue f/u renal function given daily Lasix doses. Discussed with Melissa.

## 2024-06-05 ENCOUNTER — APPOINTMENT (OUTPATIENT)
Dept: NEUROLOGY | Facility: CLINIC | Age: 89
End: 2024-06-05

## 2024-06-05 ENCOUNTER — APPOINTMENT (OUTPATIENT)
Dept: NEUROLOGY | Facility: CLINIC | Age: 89
End: 2024-06-05
Payer: MEDICARE

## 2024-06-05 VITALS
HEART RATE: 60 BPM | SYSTOLIC BLOOD PRESSURE: 178 MMHG | OXYGEN SATURATION: 98 % | BODY MASS INDEX: 26.5 KG/M2 | DIASTOLIC BLOOD PRESSURE: 61 MMHG | WEIGHT: 135 LBS | HEIGHT: 60 IN

## 2024-06-05 PROCEDURE — G2211 COMPLEX E/M VISIT ADD ON: CPT

## 2024-06-05 PROCEDURE — 99214 OFFICE O/P EST MOD 30 MIN: CPT

## 2024-06-05 NOTE — ASSESSMENT
[FreeTextEntry1] : 89 YO F w/left MCA stroke - pt has Afib and was not compliant with Eliquis at home. Neurologically improved since hospitalization, still undergoing home PT. - continue Eliquis 2.5mg BID - continue Statin and BP medication - pt was inquiring about driving, no absolute contraindications for driving at this time. I suggested that pt would drive with a friend at first.  Follow up in 6 months and PRN

## 2024-06-05 NOTE — HISTORY OF PRESENT ILLNESS
[FreeTextEntry1] : 89 YO F who was hospitalized one month ago after having left MCA stroke in the setting of medication (Eliquis) non compliance. Pt was in [facility] and received TNK, then was transferred to [facility] for possible mechanical thrombectomy of visualized left M2 branch occlusion. Cerebral angiogram showed no occlusions in the proximal vessels, and a small left M4 branch occlusion. Since the stroke, pt has been compliant with Eliquis 2.5mg BID at home. She reports complete resolution of her right hemiparesis. She underwent 3 week stay in the rehab and now undergoing home PT. Denies any new focal weakness, numbness, visual changes, speech or language abnormalities

## 2024-06-05 NOTE — PHYSICAL EXAM
[FreeTextEntry1] : Awake, alert, oriented x3 speech is fluent, language intact CHRISTIANO, EOMI, Face symmetric, tongue midline BLUEs 5/5 proximally and distally BLLEs 4+/5 proximally and distally Sensation intact to LT throughout FTN intact BL Gait: walks with a cane

## 2024-07-16 ENCOUNTER — APPOINTMENT (OUTPATIENT)
Dept: GERIATRICS | Facility: CLINIC | Age: 89
End: 2024-07-16
Payer: MEDICARE

## 2024-07-16 VITALS
DIASTOLIC BLOOD PRESSURE: 75 MMHG | WEIGHT: 139 LBS | RESPIRATION RATE: 15 BRPM | BODY MASS INDEX: 27.15 KG/M2 | SYSTOLIC BLOOD PRESSURE: 155 MMHG | TEMPERATURE: 97.2 F | OXYGEN SATURATION: 97 % | HEART RATE: 69 BPM

## 2024-07-16 DIAGNOSIS — I10 ESSENTIAL (PRIMARY) HYPERTENSION: ICD-10-CM

## 2024-07-16 DIAGNOSIS — E16.2 HYPOGLYCEMIA, UNSPECIFIED: ICD-10-CM

## 2024-07-16 DIAGNOSIS — D69.6 THROMBOCYTOPENIA, UNSPECIFIED: ICD-10-CM

## 2024-07-16 DIAGNOSIS — I50.30 UNSPECIFIED DIASTOLIC (CONGESTIVE) HEART FAILURE: ICD-10-CM

## 2024-07-16 DIAGNOSIS — I63.9 CEREBRAL INFARCTION, UNSPECIFIED: ICD-10-CM

## 2024-07-16 DIAGNOSIS — I48.91 UNSPECIFIED ATRIAL FIBRILLATION: ICD-10-CM

## 2024-07-16 DIAGNOSIS — R91.1 SOLITARY PULMONARY NODULE: ICD-10-CM

## 2024-07-16 DIAGNOSIS — Z79.899 OTHER LONG TERM (CURRENT) DRUG THERAPY: ICD-10-CM

## 2024-07-16 PROCEDURE — 99214 OFFICE O/P EST MOD 30 MIN: CPT | Mod: GC

## 2024-07-16 PROCEDURE — G2211 COMPLEX E/M VISIT ADD ON: CPT

## 2024-07-23 LAB
ALBUMIN SERPL ELPH-MCNC: 4.7 G/DL
ALP BLD-CCNC: 111 U/L
ALT SERPL-CCNC: 17 U/L
ANION GAP SERPL CALC-SCNC: 15 MMOL/L
AST SERPL-CCNC: 27 U/L
BASOPHILS # BLD AUTO: 0.02 K/UL
BASOPHILS NFR BLD AUTO: 0.4 %
BILIRUB SERPL-MCNC: 0.7 MG/DL
BUN SERPL-MCNC: 21 MG/DL
CALCIUM SERPL-MCNC: 9.1 MG/DL
CHLORIDE SERPL-SCNC: 100 MMOL/L
CO2 SERPL-SCNC: 23 MMOL/L
CREAT SERPL-MCNC: 1.11 MG/DL
EGFR: 47 ML/MIN/1.73M2
EOSINOPHIL # BLD AUTO: 0.08 K/UL
EOSINOPHIL NFR BLD AUTO: 1.5 %
GLUCOSE SERPL-MCNC: 85 MG/DL
HCT VFR BLD CALC: 42.4 %
HGB BLD-MCNC: 13.5 G/DL
IMM GRANULOCYTES NFR BLD AUTO: 0.2 %
LYMPHOCYTES # BLD AUTO: 0.69 K/UL
LYMPHOCYTES NFR BLD AUTO: 12.8 %
MAN DIFF?: NORMAL
MCHC RBC-ENTMCNC: 29.3 PG
MCHC RBC-ENTMCNC: 31.8 GM/DL
MCV RBC AUTO: 92 FL
MONOCYTES # BLD AUTO: 0.66 K/UL
MONOCYTES NFR BLD AUTO: 12.3 %
NEUTROPHILS # BLD AUTO: 3.92 K/UL
NEUTROPHILS NFR BLD AUTO: 72.8 %
PLATELET # BLD AUTO: 175 K/UL
POTASSIUM SERPL-SCNC: 3.9 MMOL/L
PROT SERPL-MCNC: 7.6 G/DL
RBC # BLD: 4.61 M/UL
RBC # FLD: 14.6 %
SODIUM SERPL-SCNC: 138 MMOL/L
WBC # FLD AUTO: 5.38 K/UL

## 2024-08-08 ENCOUNTER — APPOINTMENT (OUTPATIENT)
Dept: PULMONOLOGY | Facility: CLINIC | Age: 89
End: 2024-08-08

## 2024-08-08 PROCEDURE — 99214 OFFICE O/P EST MOD 30 MIN: CPT

## 2024-08-08 NOTE — DISCUSSION/SUMMARY
[FreeTextEntry1] : The patient appears down to be currently neurologically intact.  Her respiratory status cardiac status are stable.  She is adherent to her medication regimen.  I plan to repeat her CAT scan this fall and will follow-up with her after it is completed.

## 2024-08-08 NOTE — PHYSICAL EXAM
[No Acute Distress] : no acute distress [Normal Appearance] : normal appearance [No Neck Mass] : no neck mass [Normal Rate/Rhythm] : normal rate/rhythm [No Resp Distress] : no resp distress [Clear to Auscultation Bilaterally] : clear to auscultation bilaterally [No Edema] : no edema [Oriented x3] : oriented x3 [TextBox_54] : 3/6 systolic ejection murmur aortic area

## 2024-08-08 NOTE — HISTORY OF PRESENT ILLNESS
[TextBox_4] : 89-year-old female whom I have had been following because of a persistent solid right lower lobe nodule, 1.7 x 1.2 cm.  It has been unchanged for at least 3 years.  Since I had last seen her she had a cerebrovascular accident likely related to her atrial fibrillation.  She also had been in mild congestive heart failure.  She seems to have completely recovered.  She now is on Eliquis, amlodipine, atorvastatin and furosemide.  She had a recent respiratory infection which she is currently recovering from.  She denies any recent fevers, chest pain or dyspnea or peripheral edema.

## 2024-08-14 ENCOUNTER — NON-APPOINTMENT (OUTPATIENT)
Age: 89
End: 2024-08-14

## 2024-08-14 ENCOUNTER — APPOINTMENT (OUTPATIENT)
Dept: CARDIOLOGY | Facility: CLINIC | Age: 89
End: 2024-08-14
Payer: MEDICARE

## 2024-08-14 VITALS
WEIGHT: 140 LBS | SYSTOLIC BLOOD PRESSURE: 148 MMHG | BODY MASS INDEX: 27.34 KG/M2 | HEART RATE: 61 BPM | OXYGEN SATURATION: 96 % | DIASTOLIC BLOOD PRESSURE: 68 MMHG

## 2024-08-14 DIAGNOSIS — I10 ESSENTIAL (PRIMARY) HYPERTENSION: ICD-10-CM

## 2024-08-14 DIAGNOSIS — I48.91 UNSPECIFIED ATRIAL FIBRILLATION: ICD-10-CM

## 2024-08-14 DIAGNOSIS — I63.9 CEREBRAL INFARCTION, UNSPECIFIED: ICD-10-CM

## 2024-08-14 DIAGNOSIS — I50.30 UNSPECIFIED DIASTOLIC (CONGESTIVE) HEART FAILURE: ICD-10-CM

## 2024-08-14 PROCEDURE — 93000 ELECTROCARDIOGRAM COMPLETE: CPT

## 2024-08-14 PROCEDURE — 99215 OFFICE O/P EST HI 40 MIN: CPT | Mod: 25

## 2024-08-14 NOTE — HISTORY OF PRESENT ILLNESS
[FreeTextEntry1] : She has recovered from the CVA. No residual. BP log reviewed. BP range is normal. Now taking eliquis without significant bruising or bleeding. No GRIFFITH beyond the usual. No orthopnea.  Prior: Taking lasix 40 qd. Not limited by her dyspnea. BUN/CR is 29/1.1  Prior: Recent derm procedure. Recent UTI. Tx with Nitrofurantoin. No GRIFFITH. Less walking than she would like. No orthopnea or PND. No dizziness or falls.  Prior: Dear [facility], Thank you for referring her for cardiac management. She is an 89 year old with a history of AF, HTN, and pleural effusion who was recently hospitalized. She was hospitalized for fever and volume overload. Evaluation at that time showed on echo was moderate AS and LVH. Hyponatremia resolved. She was discharged on a diuretic and it was increased by you recently. She has chronic atrial fibrillation. Not on anticoagulation. She has a history of left pleurodesis. No know history of CAD, DM, smoking.

## 2024-08-14 NOTE — DISCUSSION/SUMMARY
[FreeTextEntry1] : She is an 89-year-old with a history of chronic atrial fibrillation, hypertension with LVH and moderate aortic stenosis who had recent hospitalization for heart failure with preserved ejection fraction. ECG: AF with moderate VR. HFpEF: Appears well compensated currently on her current regimen including 40 mg of Lasix daily and ramipril. Weight is stable.  Hypertension: Optimal at home. Continue norvasc/metorolol and Lasix.   Moderate aortic stenosis: No signs of decompensation currently.  Annual echo.   Atrial fibrillation: Chronic.  Heart rate well controlled on her current dose of metoprolol.  She now agrees to continue oral a/c for secondary stroke risk reduction. Encouraged exercise. 4 month followup. [EKG obtained to assist in diagnosis and management of assessed problem(s)] : EKG obtained to assist in diagnosis and management of assessed problem(s)

## 2024-08-14 NOTE — PHYSICAL EXAM
[Normal Venous Pressure] : normal venous pressure [Normal S1, S2] : normal S1, S2 [Clear Lung Fields] : clear lung fields [No Edema] : no edema [Normal] : alert and oriented, normal memory [de-identified] : elderly woman in no distress [de-identified] : 2/6 JESSY at [facility]. [de-identified] : walks with a cane

## 2024-09-10 NOTE — PATIENT PROFILE ADULT - FUNCTIONAL ASSESSMENT - BASIC MOBILITY SECTION LABEL
[facility] Hospitalist Internal Medicine Internal Medicine Internal Medicine Internal Medicine Pulmonology [facility] Gastroenterology Hospitalist Hospitalist Internal Medicine Internal Medicine Internal Medicine Critical Care Hospitalist Internal Medicine Internal Medicine Internal Medicine Internal Medicine Internal Medicine Gastroenterology Internal Medicine Internal Medicine Palliative Care Pulmonology Hospitalist Hospitalist Hospitalist Hospitalist Hospitalist Hospitalist . Palliative Care

## 2024-09-17 ENCOUNTER — APPOINTMENT (OUTPATIENT)
Dept: GERIATRICS | Facility: CLINIC | Age: 89
End: 2024-09-17
Payer: MEDICARE

## 2024-09-17 ENCOUNTER — NON-APPOINTMENT (OUTPATIENT)
Age: 89
End: 2024-09-17

## 2024-09-17 VITALS
RESPIRATION RATE: 16 BRPM | TEMPERATURE: 98 F | WEIGHT: 143 LBS | BODY MASS INDEX: 27.93 KG/M2 | OXYGEN SATURATION: 98 % | HEART RATE: 60 BPM | SYSTOLIC BLOOD PRESSURE: 166 MMHG | DIASTOLIC BLOOD PRESSURE: 79 MMHG

## 2024-09-17 VITALS — SYSTOLIC BLOOD PRESSURE: 140 MMHG | DIASTOLIC BLOOD PRESSURE: 70 MMHG

## 2024-09-17 DIAGNOSIS — Z71.85 ENCOUNTER FOR IMMUNIZATION SAFETY COUNSELING: ICD-10-CM

## 2024-09-17 DIAGNOSIS — I63.9 CEREBRAL INFARCTION, UNSPECIFIED: ICD-10-CM

## 2024-09-17 DIAGNOSIS — I10 ESSENTIAL (PRIMARY) HYPERTENSION: ICD-10-CM

## 2024-09-17 DIAGNOSIS — Z87.19 PERSONAL HISTORY OF OTHER DISEASES OF THE DIGESTIVE SYSTEM: ICD-10-CM

## 2024-09-17 DIAGNOSIS — D64.9 ANEMIA, UNSPECIFIED: ICD-10-CM

## 2024-09-17 DIAGNOSIS — E78.5 HYPERLIPIDEMIA, UNSPECIFIED: ICD-10-CM

## 2024-09-17 PROCEDURE — 99214 OFFICE O/P EST MOD 30 MIN: CPT | Mod: GC

## 2024-09-17 RX ORDER — POLYETHYLENE GLYCOL 3350 17 G/17G
17 POWDER, FOR SOLUTION ORAL DAILY
Qty: 30 | Refills: 2 | Status: ACTIVE | COMMUNITY
Start: 2024-09-17 | End: 1900-01-01

## 2024-09-17 NOTE — REVIEW OF SYSTEMS
[Negative] : Heme/Lymph [Lower Ext Edema] : lower extremity edema [Chest Pain] : no chest pain [Shortness Of Breath] : no shortness of breath [SOB on Exertion] : no shortness of breath during exertion [Constipation] : no constipation [FreeTextEntry4] : dry mouth [FreeTextEntry5] : murmur

## 2024-09-17 NOTE — ASSESSMENT
[FreeTextEntry1] : Ms. Medina is a 89-year-old female with PMHx of CVA 5/2024, HTN, A-fib, AS, CHF/pleural effusion, and RLL lung nodule who presents with  for follow-up. She has recovered well since hospitalization, followed up with neurology and doing PT without residual deficit. Continues to take eliquis 5mg bid as prescribed. BP again elevated today, suspect white coat HTN as home readings are excellent, advised patient to bring BP cuff to next appointment to confirm accuracy. Will obtain iron studies in setting of TAMMY on Fe tabs, will obtain lipids.

## 2024-09-17 NOTE — PHYSICAL EXAM
[Alert] : alert [Normal Outer Ear/Nose] : the ears and nose were normal in appearance [Normal Appearance] : the appearance of the neck was normal [Supple] : the neck was supple [No Respiratory Distress] : no respiratory distress [No Acc Muscle Use] : no accessory muscle use [Respiration, Rhythm And Depth] : normal respiratory rhythm and effort [Auscultation Breath Sounds / Voice Sounds] : lungs were clear to auscultation bilaterally [Normal S1, S2] : normal S1 and S2 [Heart Rate And Rhythm] : heart rate was normal and rhythm regular [___ +] : bilateral [unfilled]+ pitting edema to the ankles [Bowel Sounds] : normal bowel sounds [Normal Color / Pigmentation] : normal skin color and pigmentation [Normal Turgor] : normal skin turgor [Normal Affect] : the affect was normal [Normal Mood] : the mood was normal [de-identified] : mild LE edema [de-identified] : gait instability with small steps

## 2024-09-17 NOTE — HISTORY OF PRESENT ILLNESS
[One fall no injury in past year] : Patient reported one fall in the past year without injury [Patient is independent with] : bathing [] : managing medications [Independent] : managing finances [Cane] : cane [Grab Bars] : grab bars [Shower Chair] : shower chair [Night Light] : night light [Designated Healthcare Proxy] : Designated healthcare proxy [0] : 2) Feeling down, depressed, or hopeless: Not at all (0) [FreeTextEntry1] : Ms. Melissa Medina is a 89-year-old female with PMHx of CVA, HTN, A-fib, AS, CHF/pleural effusion, RLL lung nodule, Fhx of early colon cancer and stomach cancer presenting to [facility] for follow-up. She is feeling well today without acute complaints, denies CP, SOB, endorses mild LE swelling.   CVA: Follows with PT, doing PT exercises at home. Taking daily walks with . Eliquis adherence - taking 5mg bid. stopped taking pantoprazole.  HTN: elevated in office 166 systolic on automatic, 140 systolic on manual BP, daily home readings excellent ranging from 120-135/60s, asked to bring in long next time RLL nodule: pending chest CT VLAD: referred to vascular LV, BP well controlled no need to refer again Continue f/u renal function given daily Lasix dose Iron deficiency anemia: taking Fe every other day Hb 13.5  MCV 92 TIBC 326 Ferritin 47 Constipation: taking dulcolax daily since discharge from hospital, denies constipation HLD:  atorvastatin 40mg qd  Health maintenance: RSV 2023  last flu vac 9/6/2024 covid 2023 Prevnar Jan 2024 DEXA 12/2023 osteopenia [TextBox_25] : 5 years ago [TextBox_31] : no hearing problem  [TextBox_37] : f/u for cataract [TextBox_43] : goes to dentist regularly [TextBox_19] : 10 years ago [FreeTextEntry2] : Dermatology follow up s/p atypical skin lesion [Driving Concerns] : not driving or driving without noted concerns [[id number]] : 0 [FreeTextEntry4] : Sam Chung

## 2024-09-24 LAB
CHOLEST SERPL-MCNC: 155 MG/DL
FERRITIN SERPL-MCNC: 93 NG/ML
HDLC SERPL-MCNC: 42 MG/DL
IRON SATN MFR SERPL: 19 %
IRON SERPL-MCNC: 59 UG/DL
LDLC SERPL CALC-MCNC: 80 MG/DL
NONHDLC SERPL-MCNC: 113 MG/DL
TIBC SERPL-MCNC: 309 UG/DL
TRANSFERRIN SERPL-MCNC: 252 MG/DL
TRIGL SERPL-MCNC: 198 MG/DL
UIBC SERPL-MCNC: 250 UG/DL

## 2024-10-07 ENCOUNTER — APPOINTMENT (OUTPATIENT)
Dept: CT IMAGING | Facility: IMAGING CENTER | Age: 89
End: 2024-10-07

## 2024-10-07 ENCOUNTER — OUTPATIENT (OUTPATIENT)
Dept: OUTPATIENT SERVICES | Facility: HOSPITAL | Age: 89
LOS: 1 days | End: 2024-10-07
Payer: MEDICARE

## 2024-10-07 DIAGNOSIS — Z00.8 ENCOUNTER FOR OTHER GENERAL EXAMINATION: ICD-10-CM

## 2024-10-07 DIAGNOSIS — Z96.641 PRESENCE OF RIGHT ARTIFICIAL HIP JOINT: Chronic | ICD-10-CM

## 2024-10-07 DIAGNOSIS — R93.89 ABNORMAL FINDINGS ON DIAGNOSTIC IMAGING OF OTHER SPECIFIED BODY STRUCTURES: ICD-10-CM

## 2024-10-07 PROCEDURE — 71250 CT THORAX DX C-: CPT | Mod: 26

## 2024-10-07 PROCEDURE — 71250 CT THORAX DX C-: CPT

## 2024-11-19 ENCOUNTER — APPOINTMENT (OUTPATIENT)
Dept: GERIATRICS | Facility: CLINIC | Age: 89
End: 2024-11-19
Payer: MEDICARE

## 2024-11-19 VITALS
OXYGEN SATURATION: 98 % | HEART RATE: 54 BPM | WEIGHT: 146 LBS | DIASTOLIC BLOOD PRESSURE: 83 MMHG | SYSTOLIC BLOOD PRESSURE: 167 MMHG | BODY MASS INDEX: 28.66 KG/M2 | HEIGHT: 60 IN | TEMPERATURE: 96.6 F | RESPIRATION RATE: 16 BRPM

## 2024-11-19 VITALS — SYSTOLIC BLOOD PRESSURE: 165 MMHG | DIASTOLIC BLOOD PRESSURE: 75 MMHG

## 2024-11-19 DIAGNOSIS — I70.1 ATHEROSCLEROSIS OF RENAL ARTERY: ICD-10-CM

## 2024-11-19 DIAGNOSIS — D64.9 ANEMIA, UNSPECIFIED: ICD-10-CM

## 2024-11-19 DIAGNOSIS — R60.0 LOCALIZED EDEMA: ICD-10-CM

## 2024-11-19 DIAGNOSIS — M85.80 OTHER SPECIFIED DISORDERS OF BONE DENSITY AND STRUCTURE, UNSPECIFIED SITE: ICD-10-CM

## 2024-11-19 DIAGNOSIS — I10 ESSENTIAL (PRIMARY) HYPERTENSION: ICD-10-CM

## 2024-11-19 PROCEDURE — 99214 OFFICE O/P EST MOD 30 MIN: CPT

## 2024-11-19 PROCEDURE — 99214 OFFICE O/P EST MOD 30 MIN: CPT | Mod: GC

## 2024-11-19 RX ORDER — AMLODIPINE BESYLATE 5 MG/1
5 TABLET ORAL DAILY
Qty: 30 | Refills: 3 | Status: ACTIVE | COMMUNITY
Start: 2024-11-19 | End: 1900-01-01

## 2024-11-19 RX ORDER — RAMIPRIL 10 MG/1
10 CAPSULE ORAL DAILY
Qty: 30 | Refills: 3 | Status: ACTIVE | COMMUNITY
Start: 2024-11-19 | End: 1900-01-01

## 2024-11-20 ENCOUNTER — NON-APPOINTMENT (OUTPATIENT)
Age: 89
End: 2024-11-20

## 2024-11-22 ENCOUNTER — RX RENEWAL (OUTPATIENT)
Age: 89
End: 2024-11-22

## 2024-12-03 ENCOUNTER — APPOINTMENT (OUTPATIENT)
Dept: GERIATRICS | Facility: CLINIC | Age: 88
End: 2024-12-03

## 2024-12-05 ENCOUNTER — APPOINTMENT (OUTPATIENT)
Dept: PULMONOLOGY | Facility: CLINIC | Age: 88
End: 2024-12-05
Payer: MEDICARE

## 2024-12-05 VITALS
DIASTOLIC BLOOD PRESSURE: 70 MMHG | SYSTOLIC BLOOD PRESSURE: 153 MMHG | BODY MASS INDEX: 27.9 KG/M2 | RESPIRATION RATE: 16 BRPM | OXYGEN SATURATION: 93 % | HEART RATE: 55 BPM | WEIGHT: 142.13 LBS | TEMPERATURE: 97.8 F | HEIGHT: 60 IN

## 2024-12-05 DIAGNOSIS — I50.30 UNSPECIFIED DIASTOLIC (CONGESTIVE) HEART FAILURE: ICD-10-CM

## 2024-12-05 DIAGNOSIS — I48.91 UNSPECIFIED ATRIAL FIBRILLATION: ICD-10-CM

## 2024-12-05 PROCEDURE — 99214 OFFICE O/P EST MOD 30 MIN: CPT

## 2024-12-06 LAB
ALBUMIN SERPL ELPH-MCNC: 4.8 G/DL
ALP BLD-CCNC: 100 U/L
ALT SERPL-CCNC: 16 U/L
ANION GAP SERPL CALC-SCNC: 13 MMOL/L
AST SERPL-CCNC: 24 U/L
BILIRUB SERPL-MCNC: 0.5 MG/DL
BUN SERPL-MCNC: 36 MG/DL
CALCIUM SERPL-MCNC: 9.5 MG/DL
CHLORIDE SERPL-SCNC: 104 MMOL/L
CO2 SERPL-SCNC: 25 MMOL/L
CREAT SERPL-MCNC: 1.2 MG/DL
EGFR: 43 ML/MIN/1.73M2
POTASSIUM SERPL-SCNC: 4.5 MMOL/L
PROT SERPL-MCNC: 7.3 G/DL
SODIUM SERPL-SCNC: 142 MMOL/L

## 2024-12-12 ENCOUNTER — NON-APPOINTMENT (OUTPATIENT)
Age: 88
End: 2024-12-12

## 2024-12-12 ENCOUNTER — APPOINTMENT (OUTPATIENT)
Dept: CARDIOLOGY | Facility: CLINIC | Age: 88
End: 2024-12-12
Payer: MEDICARE

## 2024-12-12 VITALS
HEART RATE: 52 BPM | WEIGHT: 144 LBS | SYSTOLIC BLOOD PRESSURE: 150 MMHG | BODY MASS INDEX: 28.12 KG/M2 | DIASTOLIC BLOOD PRESSURE: 60 MMHG | OXYGEN SATURATION: 92 %

## 2024-12-12 DIAGNOSIS — I70.1 ATHEROSCLEROSIS OF RENAL ARTERY: ICD-10-CM

## 2024-12-12 PROCEDURE — 99215 OFFICE O/P EST HI 40 MIN: CPT

## 2024-12-12 PROCEDURE — 93000 ELECTROCARDIOGRAM COMPLETE: CPT

## 2024-12-12 PROCEDURE — G2211 COMPLEX E/M VISIT ADD ON: CPT

## 2024-12-12 RX ORDER — VALSARTAN 40 MG/1
40 TABLET, COATED ORAL DAILY
Qty: 30 | Refills: 1 | Status: ACTIVE | COMMUNITY
Start: 2024-12-12 | End: 1900-01-01

## 2024-12-31 ENCOUNTER — APPOINTMENT (OUTPATIENT)
Dept: GERIATRICS | Facility: CLINIC | Age: 88
End: 2024-12-31
Payer: MEDICARE

## 2024-12-31 VITALS
SYSTOLIC BLOOD PRESSURE: 146 MMHG | RESPIRATION RATE: 16 BRPM | TEMPERATURE: 98 F | DIASTOLIC BLOOD PRESSURE: 72 MMHG | HEIGHT: 60 IN | OXYGEN SATURATION: 94 % | BODY MASS INDEX: 28.66 KG/M2 | WEIGHT: 146 LBS

## 2024-12-31 VITALS — DIASTOLIC BLOOD PRESSURE: 68 MMHG | SYSTOLIC BLOOD PRESSURE: 135 MMHG | OXYGEN SATURATION: 99 %

## 2024-12-31 VITALS — HEART RATE: 56 BPM

## 2024-12-31 DIAGNOSIS — Z71.89 OTHER SPECIFIED COUNSELING: ICD-10-CM

## 2024-12-31 DIAGNOSIS — I70.1 RENOVASCULAR HYPERTENSION: ICD-10-CM

## 2024-12-31 DIAGNOSIS — I87.2 VENOUS INSUFFICIENCY (CHRONIC) (PERIPHERAL): ICD-10-CM

## 2024-12-31 DIAGNOSIS — I15.0 RENOVASCULAR HYPERTENSION: ICD-10-CM

## 2024-12-31 DIAGNOSIS — R60.0 LOCALIZED EDEMA: ICD-10-CM

## 2024-12-31 DIAGNOSIS — I70.1 ATHEROSCLEROSIS OF RENAL ARTERY: ICD-10-CM

## 2024-12-31 DIAGNOSIS — Z71.85 ENCOUNTER FOR IMMUNIZATION SAFETY COUNSELING: ICD-10-CM

## 2024-12-31 PROCEDURE — 99214 OFFICE O/P EST MOD 30 MIN: CPT | Mod: GC

## 2025-01-02 LAB — MAGNESIUM SERPL-MCNC: 2.4 MG/DL

## 2025-01-22 ENCOUNTER — APPOINTMENT (OUTPATIENT)
Dept: NEUROLOGY | Facility: CLINIC | Age: 89
End: 2025-01-22

## 2025-02-05 ENCOUNTER — RX RENEWAL (OUTPATIENT)
Age: 89
End: 2025-02-05

## 2025-02-17 ENCOUNTER — APPOINTMENT (OUTPATIENT)
Dept: CT IMAGING | Facility: CLINIC | Age: 89
End: 2025-02-17
Payer: MEDICARE

## 2025-02-17 PROCEDURE — 75635 CT ANGIO ABDOMINAL ARTERIES: CPT

## 2025-02-28 ENCOUNTER — NON-APPOINTMENT (OUTPATIENT)
Age: 89
End: 2025-02-28

## 2025-02-28 ENCOUNTER — APPOINTMENT (OUTPATIENT)
Dept: CARDIOLOGY | Facility: CLINIC | Age: 89
End: 2025-02-28
Payer: MEDICARE

## 2025-02-28 VITALS
WEIGHT: 148 LBS | HEART RATE: 56 BPM | DIASTOLIC BLOOD PRESSURE: 76 MMHG | OXYGEN SATURATION: 98 % | SYSTOLIC BLOOD PRESSURE: 152 MMHG | BODY MASS INDEX: 29.06 KG/M2 | HEIGHT: 60 IN

## 2025-02-28 PROCEDURE — 99215 OFFICE O/P EST HI 40 MIN: CPT | Mod: 25

## 2025-02-28 PROCEDURE — 93000 ELECTROCARDIOGRAM COMPLETE: CPT

## 2025-03-06 ENCOUNTER — APPOINTMENT (OUTPATIENT)
Dept: GASTROENTEROLOGY | Facility: CLINIC | Age: 89
End: 2025-03-06
Payer: MEDICARE

## 2025-03-06 VITALS
HEIGHT: 60 IN | SYSTOLIC BLOOD PRESSURE: 176 MMHG | BODY MASS INDEX: 28.66 KG/M2 | RESPIRATION RATE: 16 BRPM | HEART RATE: 61 BPM | DIASTOLIC BLOOD PRESSURE: 66 MMHG | OXYGEN SATURATION: 99 % | WEIGHT: 146 LBS

## 2025-03-06 DIAGNOSIS — R10.13 EPIGASTRIC PAIN: ICD-10-CM

## 2025-03-06 DIAGNOSIS — K44.9 DIAPHRAGMATIC HERNIA W/OUT OBSTRUCTION OR GANGRENE: ICD-10-CM

## 2025-03-06 DIAGNOSIS — K31.9 DISEASE OF STOMACH AND DUODENUM, UNSPECIFIED: ICD-10-CM

## 2025-03-06 PROCEDURE — 99204 OFFICE O/P NEW MOD 45 MIN: CPT

## 2025-03-19 ENCOUNTER — APPOINTMENT (OUTPATIENT)
Dept: PULMONOLOGY | Facility: CLINIC | Age: 89
End: 2025-03-19
Payer: MEDICARE

## 2025-03-19 VITALS
HEART RATE: 59 BPM | WEIGHT: 150 LBS | BODY MASS INDEX: 29.45 KG/M2 | DIASTOLIC BLOOD PRESSURE: 71 MMHG | SYSTOLIC BLOOD PRESSURE: 156 MMHG | HEIGHT: 60 IN | TEMPERATURE: 97.4 F | OXYGEN SATURATION: 59 %

## 2025-03-19 DIAGNOSIS — R93.89 ABNORMAL FINDINGS ON DIAGNOSTIC IMAGING OF OTHER SPECIFIED BODY STRUCTURES: ICD-10-CM

## 2025-03-19 DIAGNOSIS — K59.09 OTHER CONSTIPATION: ICD-10-CM

## 2025-03-19 DIAGNOSIS — R91.1 SOLITARY PULMONARY NODULE: ICD-10-CM

## 2025-03-19 PROCEDURE — 99214 OFFICE O/P EST MOD 30 MIN: CPT | Mod: GC

## 2025-03-19 PROCEDURE — G2211 COMPLEX E/M VISIT ADD ON: CPT

## 2025-03-19 RX ORDER — LACTULOSE 10 G/15ML
10 SOLUTION ORAL AS DIRECTED
Qty: 1 | Refills: 0 | Status: ACTIVE | COMMUNITY
Start: 2025-03-19 | End: 1900-01-01

## 2025-03-21 ENCOUNTER — NON-APPOINTMENT (OUTPATIENT)
Age: 89
End: 2025-03-21

## 2025-03-21 ENCOUNTER — APPOINTMENT (OUTPATIENT)
Dept: CARDIOLOGY | Facility: CLINIC | Age: 89
End: 2025-03-21
Payer: MEDICARE

## 2025-03-21 VITALS
DIASTOLIC BLOOD PRESSURE: 60 MMHG | BODY MASS INDEX: 28.9 KG/M2 | SYSTOLIC BLOOD PRESSURE: 124 MMHG | OXYGEN SATURATION: 100 % | WEIGHT: 148 LBS | HEART RATE: 58 BPM

## 2025-03-21 DIAGNOSIS — I50.30 UNSPECIFIED DIASTOLIC (CONGESTIVE) HEART FAILURE: ICD-10-CM

## 2025-03-21 DIAGNOSIS — I48.91 UNSPECIFIED ATRIAL FIBRILLATION: ICD-10-CM

## 2025-03-21 PROCEDURE — 93000 ELECTROCARDIOGRAM COMPLETE: CPT

## 2025-03-21 PROCEDURE — G2211 COMPLEX E/M VISIT ADD ON: CPT

## 2025-03-21 PROCEDURE — 99215 OFFICE O/P EST HI 40 MIN: CPT

## 2025-03-21 PROCEDURE — 93306 TTE W/DOPPLER COMPLETE: CPT

## 2025-03-25 ENCOUNTER — APPOINTMENT (OUTPATIENT)
Dept: GERIATRICS | Facility: CLINIC | Age: 89
End: 2025-03-25

## 2025-03-27 ENCOUNTER — TRANSCRIPTION ENCOUNTER (OUTPATIENT)
Age: 89
End: 2025-03-27

## 2025-03-28 DIAGNOSIS — K31.9 DISEASE OF STOMACH AND DUODENUM, UNSPECIFIED: ICD-10-CM

## 2025-04-14 ENCOUNTER — RX RENEWAL (OUTPATIENT)
Age: 89
End: 2025-04-14

## 2025-05-02 ENCOUNTER — TRANSCRIPTION ENCOUNTER (OUTPATIENT)
Age: 89
End: 2025-05-02

## 2025-05-02 ENCOUNTER — APPOINTMENT (OUTPATIENT)
Dept: GASTROENTEROLOGY | Facility: HOSPITAL | Age: 89
End: 2025-05-02

## 2025-05-02 ENCOUNTER — OUTPATIENT (OUTPATIENT)
Dept: OUTPATIENT SERVICES | Facility: HOSPITAL | Age: 89
LOS: 1 days | End: 2025-05-02
Payer: MEDICARE

## 2025-05-02 DIAGNOSIS — K21.9 GASTRO-ESOPHAGEAL REFLUX DISEASE WITHOUT ESOPHAGITIS: ICD-10-CM

## 2025-05-02 DIAGNOSIS — R13.10 DYSPHAGIA, UNSPECIFIED: ICD-10-CM

## 2025-05-02 DIAGNOSIS — Z96.641 PRESENCE OF RIGHT ARTIFICIAL HIP JOINT: Chronic | ICD-10-CM

## 2025-05-02 PROCEDURE — 88374 M/PHMTRC ALYS ISHQUANT/SEMIQ: CPT | Mod: 26

## 2025-05-02 PROCEDURE — 88341 IMHCHEM/IMCYTCHM EA ADD ANTB: CPT | Mod: 26,59

## 2025-05-02 PROCEDURE — 88341 IMHCHEM/IMCYTCHM EA ADD ANTB: CPT

## 2025-05-02 PROCEDURE — 88342 IMHCHEM/IMCYTCHM 1ST ANTB: CPT | Mod: 26,59

## 2025-05-02 PROCEDURE — 88312 SPECIAL STAINS GROUP 1: CPT | Mod: 26

## 2025-05-02 PROCEDURE — 88364 INSITU HYBRIDIZATION (FISH): CPT

## 2025-05-02 PROCEDURE — 88360 TUMOR IMMUNOHISTOCHEM/MANUAL: CPT

## 2025-05-02 PROCEDURE — 88312 SPECIAL STAINS GROUP 1: CPT

## 2025-05-02 PROCEDURE — 88307 TISSUE EXAM BY PATHOLOGIST: CPT | Mod: 26

## 2025-05-02 PROCEDURE — 88364 INSITU HYBRIDIZATION (FISH): CPT | Mod: 26

## 2025-05-02 PROCEDURE — 88307 TISSUE EXAM BY PATHOLOGIST: CPT

## 2025-05-02 PROCEDURE — 43239 EGD BIOPSY SINGLE/MULTIPLE: CPT

## 2025-05-02 PROCEDURE — 88360 TUMOR IMMUNOHISTOCHEM/MANUAL: CPT | Mod: 26,59

## 2025-05-02 PROCEDURE — 88342 IMHCHEM/IMCYTCHM 1ST ANTB: CPT

## 2025-05-02 PROCEDURE — 88365 INSITU HYBRIDIZATION (FISH): CPT

## 2025-05-02 PROCEDURE — 81450 HL NEO GSAP 5-50DNA/DNA&RNA: CPT

## 2025-05-02 PROCEDURE — 88365 INSITU HYBRIDIZATION (FISH): CPT | Mod: 26,59

## 2025-05-02 PROCEDURE — 88374 M/PHMTRC ALYS ISHQUANT/SEMIQ: CPT

## 2025-05-02 DEVICE — HEMOSPRAY HEMOSTAT ENDO 7F: Type: IMPLANTABLE DEVICE | Status: FUNCTIONAL

## 2025-05-02 DEVICE — ESOPHAGEAL BALLOON CATH CRE FIXED WIRE 6-7-8MM: Type: IMPLANTABLE DEVICE | Status: FUNCTIONAL

## 2025-05-12 ENCOUNTER — APPOINTMENT (OUTPATIENT)
Dept: CT IMAGING | Facility: CLINIC | Age: 89
End: 2025-05-12
Payer: MEDICARE

## 2025-05-12 PROCEDURE — 71250 CT THORAX DX C-: CPT

## 2025-05-14 ENCOUNTER — OUTPATIENT (OUTPATIENT)
Dept: OUTPATIENT SERVICES | Facility: HOSPITAL | Age: 89
LOS: 1 days | Discharge: HOME | End: 2025-05-14

## 2025-05-14 ENCOUNTER — NON-APPOINTMENT (OUTPATIENT)
Age: 89
End: 2025-05-14

## 2025-05-14 DIAGNOSIS — C85.10 UNSPECIFIED B-CELL LYMPHOMA, UNSPECIFIED SITE: ICD-10-CM

## 2025-05-14 DIAGNOSIS — Z96.641 PRESENCE OF RIGHT ARTIFICIAL HIP JOINT: Chronic | ICD-10-CM

## 2025-05-15 ENCOUNTER — NON-APPOINTMENT (OUTPATIENT)
Age: 89
End: 2025-05-15

## 2025-05-15 ENCOUNTER — APPOINTMENT (OUTPATIENT)
Dept: HEMATOLOGY ONCOLOGY | Facility: CLINIC | Age: 89
End: 2025-05-15
Payer: MEDICARE

## 2025-05-15 ENCOUNTER — RESULT REVIEW (OUTPATIENT)
Age: 89
End: 2025-05-15

## 2025-05-15 VITALS
BODY MASS INDEX: 32.29 KG/M2 | HEIGHT: 57.28 IN | DIASTOLIC BLOOD PRESSURE: 75 MMHG | WEIGHT: 149.67 LBS | SYSTOLIC BLOOD PRESSURE: 157 MMHG | RESPIRATION RATE: 18 BRPM | TEMPERATURE: 98.3 F | OXYGEN SATURATION: 98 % | HEART RATE: 58 BPM

## 2025-05-15 DIAGNOSIS — Z87.19 PERSONAL HISTORY OF OTHER DISEASES OF THE DIGESTIVE SYSTEM: ICD-10-CM

## 2025-05-15 DIAGNOSIS — Z86.39 PERSONAL HISTORY OF OTHER ENDOCRINE, NUTRITIONAL AND METABOLIC DISEASE: ICD-10-CM

## 2025-05-15 DIAGNOSIS — K31.9 DISEASE OF STOMACH AND DUODENUM, UNSPECIFIED: ICD-10-CM

## 2025-05-15 DIAGNOSIS — Z87.898 PERSONAL HISTORY OF OTHER SPECIFIED CONDITIONS: ICD-10-CM

## 2025-05-15 DIAGNOSIS — Z71.89 OTHER SPECIFIED COUNSELING: ICD-10-CM

## 2025-05-15 DIAGNOSIS — J90 PLEURAL EFFUSION, NOT ELSEWHERE CLASSIFIED: ICD-10-CM

## 2025-05-15 DIAGNOSIS — Z71.85 ENCOUNTER FOR IMMUNIZATION SAFETY COUNSELING: ICD-10-CM

## 2025-05-15 DIAGNOSIS — F09 UNSPECIFIED MENTAL DISORDER DUE TO KNOWN PHYSIOLOGICAL CONDITION: ICD-10-CM

## 2025-05-15 DIAGNOSIS — E87.1 HYPO-OSMOLALITY AND HYPONATREMIA: ICD-10-CM

## 2025-05-15 DIAGNOSIS — K59.00 CONSTIPATION, UNSPECIFIED: ICD-10-CM

## 2025-05-15 DIAGNOSIS — Z87.09 PERSONAL HISTORY OF OTHER DISEASES OF THE RESPIRATORY SYSTEM: ICD-10-CM

## 2025-05-15 DIAGNOSIS — Z86.2 PERSONAL HISTORY OF DISEASES OF THE BLOOD AND BLOOD-FORMING ORGANS AND CERTAIN DISORDERS INVOLVING THE IMMUNE MECHANISM: ICD-10-CM

## 2025-05-15 DIAGNOSIS — Z79.899 OTHER LONG TERM (CURRENT) DRUG THERAPY: ICD-10-CM

## 2025-05-15 DIAGNOSIS — R50.9 FEVER, UNSPECIFIED: ICD-10-CM

## 2025-05-15 DIAGNOSIS — R94.2 ABNORMAL RESULTS OF PULMONARY FUNCTION STUDIES: ICD-10-CM

## 2025-05-15 DIAGNOSIS — R10.13 EPIGASTRIC PAIN: ICD-10-CM

## 2025-05-15 DIAGNOSIS — R93.89 ABNORMAL FINDINGS ON DIAGNOSTIC IMAGING OF OTHER SPECIFIED BODY STRUCTURES: ICD-10-CM

## 2025-05-15 DIAGNOSIS — R26.9 UNSPECIFIED ABNORMALITIES OF GAIT AND MOBILITY: ICD-10-CM

## 2025-05-15 LAB
ALBUMIN SERPL ELPH-MCNC: 5 G/DL
ALP BLD-CCNC: 108 U/L
ALT SERPL-CCNC: 24 U/L
ANION GAP SERPL CALC-SCNC: 17 MMOL/L
AST SERPL-CCNC: 35 U/L
BASOPHILS # BLD AUTO: 0.02 K/UL — SIGNIFICANT CHANGE UP (ref 0–0.2)
BASOPHILS NFR BLD AUTO: 0.3 % — SIGNIFICANT CHANGE UP (ref 0–2)
BILIRUB SERPL-MCNC: 0.6 MG/DL
BUN SERPL-MCNC: 37 MG/DL
CALCIUM SERPL-MCNC: 10 MG/DL
CHLORIDE SERPL-SCNC: 101 MMOL/L
CO2 SERPL-SCNC: 23 MMOL/L
CREAT SERPL-MCNC: 1.15 MG/DL
EGFRCR SERPLBLD CKD-EPI 2021: 45 ML/MIN/1.73M2
EOSINOPHIL # BLD AUTO: 0.16 K/UL — SIGNIFICANT CHANGE UP (ref 0–0.5)
EOSINOPHIL NFR BLD AUTO: 2.7 % — SIGNIFICANT CHANGE UP (ref 0–6)
GLUCOSE SERPL-MCNC: 116 MG/DL
HBV CORE IGG+IGM SER QL: NONREACTIVE
HBV CORE IGM SER QL: NONREACTIVE
HBV SURFACE AB SER QL: NONREACTIVE
HCT VFR BLD CALC: 43.2 % — SIGNIFICANT CHANGE UP (ref 34.5–45)
HCV AB SER QL: NONREACTIVE
HCV S/CO RATIO: 0.18 S/CO
HGB BLD-MCNC: 13.8 G/DL — SIGNIFICANT CHANGE UP (ref 11.5–15.5)
HIV1+2 AB SPEC QL IA.RAPID: NONREACTIVE
IMM GRANULOCYTES NFR BLD AUTO: 0.2 % — SIGNIFICANT CHANGE UP (ref 0–0.9)
LDH SERPL-CCNC: 271 U/L
LYMPHOCYTES # BLD AUTO: 1.01 K/UL — SIGNIFICANT CHANGE UP (ref 1–3.3)
LYMPHOCYTES # BLD AUTO: 16.9 % — SIGNIFICANT CHANGE UP (ref 13–44)
MCHC RBC-ENTMCNC: 28.6 PG — SIGNIFICANT CHANGE UP (ref 27–34)
MCHC RBC-ENTMCNC: 31.9 G/DL — LOW (ref 32–36)
MCV RBC AUTO: 89.6 FL — SIGNIFICANT CHANGE UP (ref 80–100)
MONOCYTES # BLD AUTO: 0.64 K/UL — SIGNIFICANT CHANGE UP (ref 0–0.9)
MONOCYTES NFR BLD AUTO: 10.7 % — SIGNIFICANT CHANGE UP (ref 2–14)
NEUTROPHILS # BLD AUTO: 4.14 K/UL — SIGNIFICANT CHANGE UP (ref 1.8–7.4)
NEUTROPHILS NFR BLD AUTO: 69.2 % — SIGNIFICANT CHANGE UP (ref 43–77)
NRBC BLD AUTO-RTO: 0 /100 WBCS — SIGNIFICANT CHANGE UP (ref 0–0)
PLATELET # BLD AUTO: 165 K/UL — SIGNIFICANT CHANGE UP (ref 150–400)
POTASSIUM SERPL-SCNC: 5.3 MMOL/L
PROT SERPL-MCNC: 7.8 G/DL
RBC # BLD: 4.82 M/UL — SIGNIFICANT CHANGE UP (ref 3.8–5.2)
RBC # FLD: 15.5 % — HIGH (ref 10.3–14.5)
SODIUM SERPL-SCNC: 141 MMOL/L
WBC # BLD: 5.98 K/UL — SIGNIFICANT CHANGE UP (ref 3.8–10.5)
WBC # FLD AUTO: 5.98 K/UL — SIGNIFICANT CHANGE UP (ref 3.8–10.5)

## 2025-05-15 PROCEDURE — 99205 OFFICE O/P NEW HI 60 MIN: CPT

## 2025-05-15 RX ORDER — LENALIDOMIDE 10 MG/1
10 CAPSULE ORAL
Qty: 21 | Refills: 0 | Status: ACTIVE | COMMUNITY
Start: 2025-05-15 | End: 1900-01-01

## 2025-05-16 ENCOUNTER — NON-APPOINTMENT (OUTPATIENT)
Age: 89
End: 2025-05-16

## 2025-05-16 ENCOUNTER — APPOINTMENT (OUTPATIENT)
Dept: CARDIOLOGY | Facility: CLINIC | Age: 89
End: 2025-05-16
Payer: MEDICARE

## 2025-05-16 VITALS — OXYGEN SATURATION: 100 % | HEART RATE: 52 BPM | BODY MASS INDEX: 31.93 KG/M2 | WEIGHT: 149 LBS

## 2025-05-16 VITALS — DIASTOLIC BLOOD PRESSURE: 45 MMHG | SYSTOLIC BLOOD PRESSURE: 115 MMHG

## 2025-05-16 VITALS — SYSTOLIC BLOOD PRESSURE: 130 MMHG | DIASTOLIC BLOOD PRESSURE: 70 MMHG

## 2025-05-16 DIAGNOSIS — I10 ESSENTIAL (PRIMARY) HYPERTENSION: ICD-10-CM

## 2025-05-16 DIAGNOSIS — I48.91 UNSPECIFIED ATRIAL FIBRILLATION: ICD-10-CM

## 2025-05-16 DIAGNOSIS — I50.30 UNSPECIFIED DIASTOLIC (CONGESTIVE) HEART FAILURE: ICD-10-CM

## 2025-05-16 PROCEDURE — 99215 OFFICE O/P EST HI 40 MIN: CPT | Mod: 25

## 2025-05-16 PROCEDURE — 93000 ELECTROCARDIOGRAM COMPLETE: CPT

## 2025-05-16 RX ORDER — ASPIRIN 81 MG/1
81 TABLET, DELAYED RELEASE ORAL
Qty: 30 | Refills: 2 | Status: ACTIVE | COMMUNITY
Start: 2025-05-16 | End: 1900-01-01

## 2025-05-16 RX ORDER — VALACYCLOVIR 500 MG/1
500 TABLET, FILM COATED ORAL
Qty: 60 | Refills: 2 | Status: ACTIVE | COMMUNITY
Start: 2025-05-16 | End: 1900-01-01

## 2025-05-16 RX ORDER — SULFAMETHOXAZOLE AND TRIMETHOPRIM 400; 80 MG/1; MG/1
400-80 TABLET ORAL
Qty: 30 | Refills: 2 | Status: ACTIVE | COMMUNITY
Start: 2025-05-16 | End: 1900-01-01

## 2025-05-19 ENCOUNTER — APPOINTMENT (OUTPATIENT)
Dept: GASTROENTEROLOGY | Facility: CLINIC | Age: 89
End: 2025-05-19
Payer: MEDICARE

## 2025-05-19 DIAGNOSIS — K21.9 GASTRO-ESOPHAGEAL REFLUX DISEASE W/OUT ESOPHAGITIS: ICD-10-CM

## 2025-05-19 DIAGNOSIS — K59.09 OTHER CONSTIPATION: ICD-10-CM

## 2025-05-19 DIAGNOSIS — R19.4 CHANGE IN BOWEL HABIT: ICD-10-CM

## 2025-05-19 PROBLEM — C83.398: Status: ACTIVE | Noted: 2025-05-15

## 2025-05-19 PROCEDURE — 99214 OFFICE O/P EST MOD 30 MIN: CPT

## 2025-05-23 ENCOUNTER — APPOINTMENT (OUTPATIENT)
Dept: HEMATOLOGY ONCOLOGY | Facility: CLINIC | Age: 89
End: 2025-05-23

## 2025-05-23 ENCOUNTER — APPOINTMENT (OUTPATIENT)
Dept: INFUSION THERAPY | Facility: HOSPITAL | Age: 89
End: 2025-05-23

## 2025-05-23 DIAGNOSIS — R11.2 NAUSEA WITH VOMITING, UNSPECIFIED: ICD-10-CM

## 2025-05-23 DIAGNOSIS — Z51.11 ENCOUNTER FOR ANTINEOPLASTIC CHEMOTHERAPY: ICD-10-CM

## 2025-05-23 PROCEDURE — G2211 COMPLEX E/M VISIT ADD ON: CPT

## 2025-05-23 PROCEDURE — 99213 OFFICE O/P EST LOW 20 MIN: CPT

## 2025-05-27 ENCOUNTER — NON-APPOINTMENT (OUTPATIENT)
Age: 89
End: 2025-05-27

## 2025-05-28 ENCOUNTER — RESULT REVIEW (OUTPATIENT)
Age: 89
End: 2025-05-28

## 2025-05-28 ENCOUNTER — APPOINTMENT (OUTPATIENT)
Dept: HEMATOLOGY ONCOLOGY | Facility: CLINIC | Age: 89
End: 2025-05-28
Payer: MEDICARE

## 2025-05-28 VITALS
SYSTOLIC BLOOD PRESSURE: 157 MMHG | DIASTOLIC BLOOD PRESSURE: 77 MMHG | HEART RATE: 55 BPM | WEIGHT: 149.03 LBS | TEMPERATURE: 97.8 F | RESPIRATION RATE: 16 BRPM | OXYGEN SATURATION: 99 % | BODY MASS INDEX: 31.94 KG/M2

## 2025-05-28 LAB
ALBUMIN SERPL ELPH-MCNC: 4.4 G/DL
ALP BLD-CCNC: 88 U/L
ALT SERPL-CCNC: 19 U/L
ANION GAP SERPL CALC-SCNC: 14 MMOL/L
AST SERPL-CCNC: 34 U/L
BASOPHILS # BLD AUTO: 0.02 K/UL — SIGNIFICANT CHANGE UP (ref 0–0.2)
BASOPHILS NFR BLD AUTO: 0.5 % — SIGNIFICANT CHANGE UP (ref 0–2)
BILIRUB SERPL-MCNC: 0.5 MG/DL
BUN SERPL-MCNC: 35 MG/DL
CALCIUM SERPL-MCNC: 9.1 MG/DL
CHLORIDE SERPL-SCNC: 100 MMOL/L
CO2 SERPL-SCNC: 22 MMOL/L
CREAT SERPL-MCNC: 1.53 MG/DL
EGFRCR SERPLBLD CKD-EPI 2021: 32 ML/MIN/1.73M2
EOSINOPHIL # BLD AUTO: 0.14 K/UL — SIGNIFICANT CHANGE UP (ref 0–0.5)
EOSINOPHIL NFR BLD AUTO: 3.2 % — SIGNIFICANT CHANGE UP (ref 0–6)
GLUCOSE SERPL-MCNC: 97 MG/DL
HCT VFR BLD CALC: 39.1 % — SIGNIFICANT CHANGE UP (ref 34.5–45)
HGB BLD-MCNC: 12.4 G/DL — SIGNIFICANT CHANGE UP (ref 11.5–15.5)
IMM GRANULOCYTES NFR BLD AUTO: 0.2 % — SIGNIFICANT CHANGE UP (ref 0–0.9)
LYMPHOCYTES # BLD AUTO: 0.96 K/UL — LOW (ref 1–3.3)
LYMPHOCYTES # BLD AUTO: 22 % — SIGNIFICANT CHANGE UP (ref 13–44)
MCHC RBC-ENTMCNC: 28.4 PG — SIGNIFICANT CHANGE UP (ref 27–34)
MCHC RBC-ENTMCNC: 31.7 G/DL — LOW (ref 32–36)
MCV RBC AUTO: 89.5 FL — SIGNIFICANT CHANGE UP (ref 80–100)
MONOCYTES # BLD AUTO: 0.49 K/UL — SIGNIFICANT CHANGE UP (ref 0–0.9)
MONOCYTES NFR BLD AUTO: 11.2 % — SIGNIFICANT CHANGE UP (ref 2–14)
NEUTROPHILS # BLD AUTO: 2.75 K/UL — SIGNIFICANT CHANGE UP (ref 1.8–7.4)
NEUTROPHILS NFR BLD AUTO: 62.9 % — SIGNIFICANT CHANGE UP (ref 43–77)
NRBC BLD AUTO-RTO: 0 /100 WBCS — SIGNIFICANT CHANGE UP (ref 0–0)
PLATELET # BLD AUTO: 129 K/UL — LOW (ref 150–400)
POTASSIUM SERPL-SCNC: 4.8 MMOL/L
PROT SERPL-MCNC: 7 G/DL
RBC # BLD: 4.37 M/UL — SIGNIFICANT CHANGE UP (ref 3.8–5.2)
RBC # FLD: 15.5 % — HIGH (ref 10.3–14.5)
SODIUM SERPL-SCNC: 137 MMOL/L
WBC # BLD: 4.37 K/UL — SIGNIFICANT CHANGE UP (ref 3.8–10.5)
WBC # FLD AUTO: 4.37 K/UL — SIGNIFICANT CHANGE UP (ref 3.8–10.5)

## 2025-05-28 PROCEDURE — 99213 OFFICE O/P EST LOW 20 MIN: CPT

## 2025-05-30 ENCOUNTER — NON-APPOINTMENT (OUTPATIENT)
Age: 89
End: 2025-05-30

## 2025-05-30 ENCOUNTER — APPOINTMENT (OUTPATIENT)
Dept: INFUSION THERAPY | Facility: HOSPITAL | Age: 89
End: 2025-05-30

## 2025-06-02 ENCOUNTER — OUTPATIENT (OUTPATIENT)
Dept: OUTPATIENT SERVICES | Facility: HOSPITAL | Age: 89
LOS: 1 days | End: 2025-06-02
Payer: MEDICARE

## 2025-06-02 ENCOUNTER — APPOINTMENT (OUTPATIENT)
Dept: CT IMAGING | Facility: IMAGING CENTER | Age: 89
End: 2025-06-02
Payer: MEDICARE

## 2025-06-02 ENCOUNTER — APPOINTMENT (OUTPATIENT)
Dept: HEMATOLOGY ONCOLOGY | Facility: CLINIC | Age: 89
End: 2025-06-02
Payer: MEDICARE

## 2025-06-02 VITALS
HEART RATE: 58 BPM | SYSTOLIC BLOOD PRESSURE: 145 MMHG | DIASTOLIC BLOOD PRESSURE: 66 MMHG | OXYGEN SATURATION: 98 % | BODY MASS INDEX: 32.88 KG/M2 | WEIGHT: 153.44 LBS | TEMPERATURE: 97.3 F | RESPIRATION RATE: 16 BRPM

## 2025-06-02 DIAGNOSIS — C83.398 DIFFUSE LARGE B-CELL LYMPHOMA OF OTHER EXTRANODAL AND SOLID ORGAN SITES: ICD-10-CM

## 2025-06-02 DIAGNOSIS — Z96.641 PRESENCE OF RIGHT ARTIFICIAL HIP JOINT: Chronic | ICD-10-CM

## 2025-06-02 PROCEDURE — 99213 OFFICE O/P EST LOW 20 MIN: CPT

## 2025-06-02 PROCEDURE — 74176 CT ABD & PELVIS W/O CONTRAST: CPT

## 2025-06-02 PROCEDURE — 74176 CT ABD & PELVIS W/O CONTRAST: CPT | Mod: 26

## 2025-06-05 ENCOUNTER — NON-APPOINTMENT (OUTPATIENT)
Age: 89
End: 2025-06-05

## 2025-06-05 DIAGNOSIS — C83.398 DIFFUSE LARGE B-CELL LYMPHOMA OF OTHER EXTRANODAL AND SOLID ORGAN SITES: ICD-10-CM

## 2025-06-16 ENCOUNTER — RESULT REVIEW (OUTPATIENT)
Age: 89
End: 2025-06-16

## 2025-06-16 ENCOUNTER — APPOINTMENT (OUTPATIENT)
Dept: INFUSION THERAPY | Facility: HOSPITAL | Age: 89
End: 2025-06-16

## 2025-06-16 ENCOUNTER — APPOINTMENT (OUTPATIENT)
Dept: HEMATOLOGY ONCOLOGY | Facility: CLINIC | Age: 89
End: 2025-06-16
Payer: MEDICARE

## 2025-06-16 VITALS
RESPIRATION RATE: 16 BRPM | WEIGHT: 148.99 LBS | DIASTOLIC BLOOD PRESSURE: 72 MMHG | SYSTOLIC BLOOD PRESSURE: 157 MMHG | HEART RATE: 61 BPM | TEMPERATURE: 96.6 F | OXYGEN SATURATION: 98 % | BODY MASS INDEX: 31.93 KG/M2

## 2025-06-16 LAB
ALBUMIN SERPL ELPH-MCNC: 4.7 G/DL — SIGNIFICANT CHANGE UP (ref 3.3–5)
ALBUMIN SERPL ELPH-MCNC: 4.8 G/DL
ALP BLD-CCNC: 97 U/L
ALP SERPL-CCNC: 96 U/L — SIGNIFICANT CHANGE UP (ref 40–120)
ALT FLD-CCNC: 22 U/L — SIGNIFICANT CHANGE UP (ref 10–45)
ALT SERPL-CCNC: 23 U/L
ANION GAP SERPL CALC-SCNC: 12 MMOL/L
ANION GAP SERPL CALC-SCNC: 13 MMOL/L — SIGNIFICANT CHANGE UP (ref 5–17)
AST SERPL-CCNC: 29 U/L
AST SERPL-CCNC: 37 U/L — SIGNIFICANT CHANGE UP (ref 10–40)
BASOPHILS # BLD AUTO: 0.06 K/UL — SIGNIFICANT CHANGE UP (ref 0–0.2)
BASOPHILS NFR BLD AUTO: 1.7 % — SIGNIFICANT CHANGE UP (ref 0–2)
BILIRUB SERPL-MCNC: 0.3 MG/DL — SIGNIFICANT CHANGE UP (ref 0.2–1.2)
BILIRUB SERPL-MCNC: 0.5 MG/DL
BUN SERPL-MCNC: 35 MG/DL
BUN SERPL-MCNC: 35 MG/DL — HIGH (ref 7–23)
CALCIUM SERPL-MCNC: 9.2 MG/DL — SIGNIFICANT CHANGE UP (ref 8.4–10.5)
CALCIUM SERPL-MCNC: 9.4 MG/DL
CHLORIDE SERPL-SCNC: 103 MMOL/L — SIGNIFICANT CHANGE UP (ref 96–108)
CHLORIDE SERPL-SCNC: 104 MMOL/L
CO2 SERPL-SCNC: 21 MMOL/L — LOW (ref 22–31)
CO2 SERPL-SCNC: 23 MMOL/L
CREAT SERPL-MCNC: 1.27 MG/DL — SIGNIFICANT CHANGE UP (ref 0.5–1.3)
CREAT SERPL-MCNC: 1.37 MG/DL
EGFR: 40 ML/MIN/1.73M2 — LOW
EGFR: 40 ML/MIN/1.73M2 — LOW
EGFRCR SERPLBLD CKD-EPI 2021: 36 ML/MIN/1.73M2
EOSINOPHIL # BLD AUTO: 0.04 K/UL — SIGNIFICANT CHANGE UP (ref 0–0.5)
EOSINOPHIL NFR BLD AUTO: 1.2 % — SIGNIFICANT CHANGE UP (ref 0–6)
GLUCOSE SERPL-MCNC: 160 MG/DL — HIGH (ref 70–99)
GLUCOSE SERPL-MCNC: 79 MG/DL
HCT VFR BLD CALC: 38.2 % — SIGNIFICANT CHANGE UP (ref 34.5–45)
HGB BLD-MCNC: 12.5 G/DL — SIGNIFICANT CHANGE UP (ref 11.5–15.5)
IMM GRANULOCYTES NFR BLD AUTO: 0.3 % — SIGNIFICANT CHANGE UP (ref 0–0.9)
LYMPHOCYTES # BLD AUTO: 0.82 K/UL — LOW (ref 1–3.3)
LYMPHOCYTES # BLD AUTO: 23.8 % — SIGNIFICANT CHANGE UP (ref 13–44)
MCHC RBC-ENTMCNC: 29.6 PG — SIGNIFICANT CHANGE UP (ref 27–34)
MCHC RBC-ENTMCNC: 32.7 G/DL — SIGNIFICANT CHANGE UP (ref 32–36)
MCV RBC AUTO: 90.5 FL — SIGNIFICANT CHANGE UP (ref 80–100)
MONOCYTES # BLD AUTO: 0.58 K/UL — SIGNIFICANT CHANGE UP (ref 0–0.9)
MONOCYTES NFR BLD AUTO: 16.8 % — HIGH (ref 2–14)
NEUTROPHILS # BLD AUTO: 1.94 K/UL — SIGNIFICANT CHANGE UP (ref 1.8–7.4)
NEUTROPHILS NFR BLD AUTO: 56.2 % — SIGNIFICANT CHANGE UP (ref 43–77)
NRBC BLD AUTO-RTO: 0 /100 WBCS — SIGNIFICANT CHANGE UP (ref 0–0)
PLATELET # BLD AUTO: 193 K/UL — SIGNIFICANT CHANGE UP (ref 150–400)
POTASSIUM SERPL-MCNC: 4.4 MMOL/L — SIGNIFICANT CHANGE UP (ref 3.5–5.3)
POTASSIUM SERPL-SCNC: 4.4 MMOL/L — SIGNIFICANT CHANGE UP (ref 3.5–5.3)
POTASSIUM SERPL-SCNC: 4.8 MMOL/L
PROT SERPL-MCNC: 7.6 G/DL — SIGNIFICANT CHANGE UP (ref 6–8.3)
PROT SERPL-MCNC: 7.7 G/DL
RBC # BLD: 4.22 M/UL — SIGNIFICANT CHANGE UP (ref 3.8–5.2)
RBC # FLD: 17.6 % — HIGH (ref 10.3–14.5)
SODIUM SERPL-SCNC: 137 MMOL/L — SIGNIFICANT CHANGE UP (ref 135–145)
SODIUM SERPL-SCNC: 138 MMOL/L
WBC # BLD: 3.45 K/UL — LOW (ref 3.8–10.5)
WBC # FLD AUTO: 3.45 K/UL — LOW (ref 3.8–10.5)

## 2025-06-16 PROCEDURE — G2211 COMPLEX E/M VISIT ADD ON: CPT

## 2025-06-16 PROCEDURE — 99215 OFFICE O/P EST HI 40 MIN: CPT

## 2025-06-19 ENCOUNTER — APPOINTMENT (OUTPATIENT)
Dept: PULMONOLOGY | Facility: CLINIC | Age: 89
End: 2025-06-19
Payer: MEDICARE

## 2025-06-19 VITALS
HEART RATE: 65 BPM | DIASTOLIC BLOOD PRESSURE: 68 MMHG | WEIGHT: 150 LBS | SYSTOLIC BLOOD PRESSURE: 134 MMHG | OXYGEN SATURATION: 94 % | BODY MASS INDEX: 32.36 KG/M2 | TEMPERATURE: 97.8 F | HEIGHT: 57 IN

## 2025-06-19 PROCEDURE — 99214 OFFICE O/P EST MOD 30 MIN: CPT

## 2025-06-26 ENCOUNTER — RESULT REVIEW (OUTPATIENT)
Age: 89
End: 2025-06-26

## 2025-06-26 ENCOUNTER — APPOINTMENT (OUTPATIENT)
Dept: HEMATOLOGY ONCOLOGY | Facility: CLINIC | Age: 89
End: 2025-06-26
Payer: MEDICARE

## 2025-06-26 VITALS
DIASTOLIC BLOOD PRESSURE: 77 MMHG | WEIGHT: 150 LBS | HEART RATE: 70 BPM | RESPIRATION RATE: 16 BRPM | BODY MASS INDEX: 32.46 KG/M2 | TEMPERATURE: 97.3 F | SYSTOLIC BLOOD PRESSURE: 158 MMHG | OXYGEN SATURATION: 95 %

## 2025-06-26 LAB
ALBUMIN SERPL ELPH-MCNC: 4.5 G/DL
ALP BLD-CCNC: 88 U/L
ALT SERPL-CCNC: 38 U/L
ANION GAP SERPL CALC-SCNC: 15 MMOL/L
AST SERPL-CCNC: 42 U/L
BASOPHILS # BLD AUTO: 0.04 K/UL — SIGNIFICANT CHANGE UP (ref 0–0.2)
BASOPHILS NFR BLD AUTO: 1.1 % — SIGNIFICANT CHANGE UP (ref 0–2)
BILIRUB SERPL-MCNC: 0.5 MG/DL
BUN SERPL-MCNC: 31 MG/DL
CALCIUM SERPL-MCNC: 8.9 MG/DL
CHLORIDE SERPL-SCNC: 104 MMOL/L
CO2 SERPL-SCNC: 19 MMOL/L
CREAT SERPL-MCNC: 1.68 MG/DL
EGFRCR SERPLBLD CKD-EPI 2021: 29 ML/MIN/1.73M2
EOSINOPHIL # BLD AUTO: 0.17 K/UL — SIGNIFICANT CHANGE UP (ref 0–0.5)
EOSINOPHIL NFR BLD AUTO: 4.8 % — SIGNIFICANT CHANGE UP (ref 0–6)
GLUCOSE SERPL-MCNC: 97 MG/DL
HCT VFR BLD CALC: 35.8 % — SIGNIFICANT CHANGE UP (ref 34.5–45)
HGB BLD-MCNC: 11.8 G/DL — SIGNIFICANT CHANGE UP (ref 11.5–15.5)
IMM GRANULOCYTES NFR BLD AUTO: 0.3 % — SIGNIFICANT CHANGE UP (ref 0–0.9)
LYMPHOCYTES # BLD AUTO: 0.67 K/UL — LOW (ref 1–3.3)
LYMPHOCYTES # BLD AUTO: 19 % — SIGNIFICANT CHANGE UP (ref 13–44)
MCHC RBC-ENTMCNC: 29.6 PG — SIGNIFICANT CHANGE UP (ref 27–34)
MCHC RBC-ENTMCNC: 33 G/DL — SIGNIFICANT CHANGE UP (ref 32–36)
MCV RBC AUTO: 89.7 FL — SIGNIFICANT CHANGE UP (ref 80–100)
MONOCYTES # BLD AUTO: 0.35 K/UL — SIGNIFICANT CHANGE UP (ref 0–0.9)
MONOCYTES NFR BLD AUTO: 9.9 % — SIGNIFICANT CHANGE UP (ref 2–14)
NEUTROPHILS # BLD AUTO: 2.29 K/UL — SIGNIFICANT CHANGE UP (ref 1.8–7.4)
NEUTROPHILS NFR BLD AUTO: 64.9 % — SIGNIFICANT CHANGE UP (ref 43–77)
NRBC BLD AUTO-RTO: 0 /100 WBCS — SIGNIFICANT CHANGE UP (ref 0–0)
PLATELET # BLD AUTO: 153 K/UL — SIGNIFICANT CHANGE UP (ref 150–400)
POTASSIUM SERPL-SCNC: 4.9 MMOL/L
PROT SERPL-MCNC: 6.5 G/DL
RBC # BLD: 3.99 M/UL — SIGNIFICANT CHANGE UP (ref 3.8–5.2)
RBC # FLD: 18.2 % — HIGH (ref 10.3–14.5)
SODIUM SERPL-SCNC: 138 MMOL/L
WBC # BLD: 3.53 K/UL — LOW (ref 3.8–10.5)
WBC # FLD AUTO: 3.53 K/UL — LOW (ref 3.8–10.5)

## 2025-06-26 PROCEDURE — 99215 OFFICE O/P EST HI 40 MIN: CPT

## 2025-06-26 PROCEDURE — G2211 COMPLEX E/M VISIT ADD ON: CPT

## 2025-07-03 ENCOUNTER — APPOINTMENT (OUTPATIENT)
Dept: CARDIOLOGY | Facility: CLINIC | Age: 89
End: 2025-07-03
Payer: MEDICARE

## 2025-07-03 VITALS — OXYGEN SATURATION: 100 % | BODY MASS INDEX: 32.68 KG/M2 | WEIGHT: 151 LBS | HEART RATE: 49 BPM

## 2025-07-03 VITALS — SYSTOLIC BLOOD PRESSURE: 156 MMHG | DIASTOLIC BLOOD PRESSURE: 63 MMHG

## 2025-07-03 PROCEDURE — G2211 COMPLEX E/M VISIT ADD ON: CPT

## 2025-07-03 PROCEDURE — 99215 OFFICE O/P EST HI 40 MIN: CPT

## 2025-07-03 PROCEDURE — 93000 ELECTROCARDIOGRAM COMPLETE: CPT

## 2025-07-07 ENCOUNTER — RESULT REVIEW (OUTPATIENT)
Age: 89
End: 2025-07-07

## 2025-07-07 ENCOUNTER — APPOINTMENT (OUTPATIENT)
Dept: HEMATOLOGY ONCOLOGY | Facility: CLINIC | Age: 89
End: 2025-07-07

## 2025-07-07 ENCOUNTER — APPOINTMENT (OUTPATIENT)
Dept: INFUSION THERAPY | Facility: HOSPITAL | Age: 89
End: 2025-07-07

## 2025-07-07 ENCOUNTER — APPOINTMENT (OUTPATIENT)
Dept: HEMATOLOGY ONCOLOGY | Facility: CLINIC | Age: 89
End: 2025-07-07
Payer: MEDICARE

## 2025-07-07 LAB
ALBUMIN SERPL ELPH-MCNC: 4.5 G/DL — SIGNIFICANT CHANGE UP (ref 3.3–5)
ALP SERPL-CCNC: 91 U/L — SIGNIFICANT CHANGE UP (ref 40–120)
ALT FLD-CCNC: 27 U/L — SIGNIFICANT CHANGE UP (ref 10–45)
ANION GAP SERPL CALC-SCNC: 13 MMOL/L — SIGNIFICANT CHANGE UP (ref 5–17)
ANISOCYTOSIS BLD QL: SLIGHT — SIGNIFICANT CHANGE UP
AST SERPL-CCNC: 29 U/L — SIGNIFICANT CHANGE UP (ref 10–40)
BASOPHILS # BLD AUTO: 0.06 K/UL — SIGNIFICANT CHANGE UP (ref 0–0.2)
BASOPHILS NFR BLD AUTO: 1.6 % — SIGNIFICANT CHANGE UP (ref 0–2)
BILIRUB SERPL-MCNC: 0.6 MG/DL — SIGNIFICANT CHANGE UP (ref 0.2–1.2)
BUN SERPL-MCNC: 34 MG/DL — HIGH (ref 7–23)
CALCIUM SERPL-MCNC: 9.4 MG/DL — SIGNIFICANT CHANGE UP (ref 8.4–10.5)
CHLORIDE SERPL-SCNC: 102 MMOL/L — SIGNIFICANT CHANGE UP (ref 96–108)
CO2 SERPL-SCNC: 22 MMOL/L — SIGNIFICANT CHANGE UP (ref 22–31)
CREAT SERPL-MCNC: 1.52 MG/DL — HIGH (ref 0.5–1.3)
EGFR: 32 ML/MIN/1.73M2 — LOW
EGFR: 32 ML/MIN/1.73M2 — LOW
ELLIPTOCYTES BLD QL SMEAR: SLIGHT — SIGNIFICANT CHANGE UP
EOSINOPHIL # BLD AUTO: 0.2 K/UL — SIGNIFICANT CHANGE UP (ref 0–0.5)
EOSINOPHIL NFR BLD AUTO: 5.3 % — SIGNIFICANT CHANGE UP (ref 0–6)
GLUCOSE SERPL-MCNC: 100 MG/DL — HIGH (ref 70–99)
HCT VFR BLD CALC: 34.3 % — LOW (ref 34.5–45)
HGB BLD-MCNC: 11.5 G/DL — SIGNIFICANT CHANGE UP (ref 11.5–15.5)
IMM GRANULOCYTES NFR BLD AUTO: 1.1 % — HIGH (ref 0–0.9)
LYMPHOCYTES # BLD AUTO: 0.68 K/UL — LOW (ref 1–3.3)
LYMPHOCYTES # BLD AUTO: 17.9 % — SIGNIFICANT CHANGE UP (ref 13–44)
MCHC RBC-ENTMCNC: 30.2 PG — SIGNIFICANT CHANGE UP (ref 27–34)
MCHC RBC-ENTMCNC: 33.5 G/DL — SIGNIFICANT CHANGE UP (ref 32–36)
MCV RBC AUTO: 90 FL — SIGNIFICANT CHANGE UP (ref 80–100)
MONOCYTES # BLD AUTO: 1.13 K/UL — HIGH (ref 0–0.9)
MONOCYTES NFR BLD AUTO: 29.8 % — HIGH (ref 2–14)
NEUTROPHILS # BLD AUTO: 1.68 K/UL — LOW (ref 1.8–7.4)
NEUTROPHILS NFR BLD AUTO: 44.3 % — SIGNIFICANT CHANGE UP (ref 43–77)
NRBC BLD AUTO-RTO: 0 /100 WBCS — SIGNIFICANT CHANGE UP (ref 0–0)
PLAT MORPH BLD: NORMAL — SIGNIFICANT CHANGE UP
PLATELET # BLD AUTO: 142 K/UL — LOW (ref 150–400)
POIKILOCYTOSIS BLD QL AUTO: SLIGHT — SIGNIFICANT CHANGE UP
POTASSIUM SERPL-MCNC: 5 MMOL/L — SIGNIFICANT CHANGE UP (ref 3.5–5.3)
POTASSIUM SERPL-SCNC: 5 MMOL/L — SIGNIFICANT CHANGE UP (ref 3.5–5.3)
PROT SERPL-MCNC: 7.2 G/DL — SIGNIFICANT CHANGE UP (ref 6–8.3)
RBC # BLD: 3.81 M/UL — SIGNIFICANT CHANGE UP (ref 3.8–5.2)
RBC # FLD: 19.9 % — HIGH (ref 10.3–14.5)
RBC BLD AUTO: ABNORMAL
SCHISTOCYTES BLD QL AUTO: SLIGHT — SIGNIFICANT CHANGE UP
SODIUM SERPL-SCNC: 136 MMOL/L — SIGNIFICANT CHANGE UP (ref 135–145)
WBC # BLD: 3.79 K/UL — LOW (ref 3.8–10.5)
WBC # FLD AUTO: 3.79 K/UL — LOW (ref 3.8–10.5)

## 2025-07-07 PROCEDURE — 99212 OFFICE O/P EST SF 10 MIN: CPT

## 2025-07-07 RX ORDER — SULFAMETHOXAZOLE AND TRIMETHOPRIM 400; 80 MG/1; MG/1
400-80 TABLET ORAL DAILY
Qty: 30 | Refills: 2 | Status: ACTIVE | COMMUNITY
Start: 2025-07-07 | End: 1900-01-01

## 2025-07-28 ENCOUNTER — RESULT REVIEW (OUTPATIENT)
Age: 89
End: 2025-07-28

## 2025-07-28 ENCOUNTER — APPOINTMENT (OUTPATIENT)
Dept: HEMATOLOGY ONCOLOGY | Facility: CLINIC | Age: 89
End: 2025-07-28

## 2025-07-28 ENCOUNTER — APPOINTMENT (OUTPATIENT)
Dept: INFUSION THERAPY | Facility: HOSPITAL | Age: 89
End: 2025-07-28

## 2025-07-28 ENCOUNTER — APPOINTMENT (OUTPATIENT)
Dept: HEMATOLOGY ONCOLOGY | Facility: CLINIC | Age: 89
End: 2025-07-28
Payer: MEDICARE

## 2025-07-28 PROCEDURE — 99213 OFFICE O/P EST LOW 20 MIN: CPT

## 2025-08-05 ENCOUNTER — RESULT REVIEW (OUTPATIENT)
Age: 89
End: 2025-08-05

## 2025-08-08 RX ORDER — SACUBITRIL AND VALSARTAN 49; 51 MG/1; MG/1
49-51 TABLET, FILM COATED ORAL
Qty: 180 | Refills: 0 | Status: ACTIVE | COMMUNITY
Start: 2025-08-08 | End: 1900-01-01

## 2025-08-12 ENCOUNTER — APPOINTMENT (OUTPATIENT)
Dept: CT IMAGING | Facility: IMAGING CENTER | Age: 89
End: 2025-08-12
Payer: MEDICARE

## 2025-08-12 ENCOUNTER — OUTPATIENT (OUTPATIENT)
Dept: OUTPATIENT SERVICES | Facility: HOSPITAL | Age: 89
LOS: 1 days | End: 2025-08-12
Payer: MEDICARE

## 2025-08-12 DIAGNOSIS — C83.398 DIFFUSE LARGE B-CELL LYMPHOMA OF OTHER EXTRANODAL AND SOLID ORGAN SITES: ICD-10-CM

## 2025-08-12 DIAGNOSIS — Z96.641 PRESENCE OF RIGHT ARTIFICIAL HIP JOINT: Chronic | ICD-10-CM

## 2025-08-12 PROCEDURE — 74176 CT ABD & PELVIS W/O CONTRAST: CPT

## 2025-08-12 PROCEDURE — 71250 CT THORAX DX C-: CPT | Mod: 26

## 2025-08-12 PROCEDURE — 71250 CT THORAX DX C-: CPT

## 2025-08-12 PROCEDURE — 74176 CT ABD & PELVIS W/O CONTRAST: CPT | Mod: 26

## 2025-08-14 ENCOUNTER — APPOINTMENT (OUTPATIENT)
Dept: CARDIOLOGY | Facility: CLINIC | Age: 89
End: 2025-08-14
Payer: MEDICARE

## 2025-08-14 VITALS
OXYGEN SATURATION: 100 % | SYSTOLIC BLOOD PRESSURE: 120 MMHG | DIASTOLIC BLOOD PRESSURE: 60 MMHG | BODY MASS INDEX: 30.73 KG/M2 | HEART RATE: 50 BPM | WEIGHT: 142 LBS

## 2025-08-14 DIAGNOSIS — I10 ESSENTIAL (PRIMARY) HYPERTENSION: ICD-10-CM

## 2025-08-14 DIAGNOSIS — I50.30 UNSPECIFIED DIASTOLIC (CONGESTIVE) HEART FAILURE: ICD-10-CM

## 2025-08-14 DIAGNOSIS — I48.91 UNSPECIFIED ATRIAL FIBRILLATION: ICD-10-CM

## 2025-08-14 DIAGNOSIS — I35.0 NONRHEUMATIC AORTIC (VALVE) STENOSIS: ICD-10-CM

## 2025-08-14 PROCEDURE — G2211 COMPLEX E/M VISIT ADD ON: CPT

## 2025-08-14 PROCEDURE — 99214 OFFICE O/P EST MOD 30 MIN: CPT

## 2025-08-14 PROCEDURE — 93000 ELECTROCARDIOGRAM COMPLETE: CPT

## 2025-08-18 ENCOUNTER — APPOINTMENT (OUTPATIENT)
Dept: HEMATOLOGY ONCOLOGY | Facility: CLINIC | Age: 89
End: 2025-08-18
Payer: MEDICARE

## 2025-08-18 ENCOUNTER — RESULT REVIEW (OUTPATIENT)
Age: 89
End: 2025-08-18

## 2025-08-18 ENCOUNTER — APPOINTMENT (OUTPATIENT)
Dept: INFUSION THERAPY | Facility: HOSPITAL | Age: 89
End: 2025-08-18

## 2025-08-18 PROCEDURE — 99213 OFFICE O/P EST LOW 20 MIN: CPT

## 2025-08-22 DIAGNOSIS — C83.398 DIFFUSE LARGE B-CELL LYMPH OF EXTRNOD AND SOLID ORGAN SITES: ICD-10-CM

## 2025-09-04 ENCOUNTER — APPOINTMENT (OUTPATIENT)
Dept: GASTROENTEROLOGY | Facility: CLINIC | Age: 89
End: 2025-09-04
Payer: MEDICARE

## 2025-09-04 VITALS
RESPIRATION RATE: 15 BRPM | DIASTOLIC BLOOD PRESSURE: 53 MMHG | SYSTOLIC BLOOD PRESSURE: 98 MMHG | HEIGHT: 57 IN | BODY MASS INDEX: 32.05 KG/M2 | TEMPERATURE: 98.2 F | WEIGHT: 148.56 LBS | OXYGEN SATURATION: 98 % | HEART RATE: 53 BPM

## 2025-09-04 DIAGNOSIS — K59.09 OTHER CONSTIPATION: ICD-10-CM

## 2025-09-04 DIAGNOSIS — I48.91 UNSPECIFIED ATRIAL FIBRILLATION: ICD-10-CM

## 2025-09-04 DIAGNOSIS — K20.90 ESOPHAGITIS, UNSPECIFIED WITHOUT BLEEDING: ICD-10-CM

## 2025-09-04 PROCEDURE — 99214 OFFICE O/P EST MOD 30 MIN: CPT

## 2025-09-04 RX ORDER — FAMOTIDINE 40 MG/1
40 TABLET, FILM COATED ORAL
Qty: 90 | Refills: 3 | Status: ACTIVE | COMMUNITY
Start: 2025-09-04 | End: 1900-01-01

## 2025-09-05 ENCOUNTER — APPOINTMENT (OUTPATIENT)
Dept: HEMATOLOGY ONCOLOGY | Facility: CLINIC | Age: 89
End: 2025-09-05
Payer: MEDICARE

## 2025-09-05 ENCOUNTER — RESULT REVIEW (OUTPATIENT)
Age: 89
End: 2025-09-05

## 2025-09-05 VITALS
SYSTOLIC BLOOD PRESSURE: 128 MMHG | HEART RATE: 61 BPM | WEIGHT: 148.15 LBS | OXYGEN SATURATION: 97 % | DIASTOLIC BLOOD PRESSURE: 66 MMHG | RESPIRATION RATE: 16 BRPM | BODY MASS INDEX: 32.06 KG/M2

## 2025-09-05 DIAGNOSIS — C83.398 DIFFUSE LARGE B-CELL LYMPH OF EXTRNOD AND SOLID ORGAN SITES: ICD-10-CM

## 2025-09-05 PROCEDURE — 99213 OFFICE O/P EST LOW 20 MIN: CPT

## 2025-09-08 ENCOUNTER — APPOINTMENT (OUTPATIENT)
Dept: INFUSION THERAPY | Facility: HOSPITAL | Age: 89
End: 2025-09-08

## 2025-09-08 ENCOUNTER — RESULT REVIEW (OUTPATIENT)
Age: 89
End: 2025-09-08

## 2025-09-08 ENCOUNTER — APPOINTMENT (OUTPATIENT)
Dept: HEMATOLOGY ONCOLOGY | Facility: CLINIC | Age: 89
End: 2025-09-08

## 2025-09-19 ENCOUNTER — APPOINTMENT (OUTPATIENT)
Dept: PULMONOLOGY | Facility: CLINIC | Age: 89
End: 2025-09-19
Payer: MEDICARE

## 2025-09-19 VITALS
DIASTOLIC BLOOD PRESSURE: 60 MMHG | OXYGEN SATURATION: 94 % | SYSTOLIC BLOOD PRESSURE: 151 MMHG | BODY MASS INDEX: 30.85 KG/M2 | RESPIRATION RATE: 16 BRPM | HEART RATE: 58 BPM | TEMPERATURE: 97.7 F | HEIGHT: 57 IN | WEIGHT: 143 LBS

## 2025-09-19 DIAGNOSIS — R60.0 LOCALIZED EDEMA: ICD-10-CM

## 2025-09-19 DIAGNOSIS — C83.398 DIFFUSE LARGE B-CELL LYMPH OF EXTRNOD AND SOLID ORGAN SITES: ICD-10-CM

## 2025-09-19 DIAGNOSIS — I50.30 UNSPECIFIED DIASTOLIC (CONGESTIVE) HEART FAILURE: ICD-10-CM

## 2025-09-19 DIAGNOSIS — R91.1 SOLITARY PULMONARY NODULE: ICD-10-CM

## 2025-09-19 PROCEDURE — 99214 OFFICE O/P EST MOD 30 MIN: CPT

## (undated) DEVICE — FORCEP RADIAL JAW 4 W NDL 2.2MM 2.8MM 160CM YELLOW DISP

## (undated) DEVICE — CATH ELECHMSTAT  INJ 7FR 210CM

## (undated) DEVICE — BITE BLOCK MAXI RUBBER STAMP

## (undated) DEVICE — SET IV PUMP BLOOD 1VALVE 180FILTER NON-DEHP

## (undated) DEVICE — BRUSH CYTO ENDO

## (undated) DEVICE — TUBING IV SET SECOND 34" W/O LOK-BLUNT

## (undated) DEVICE — CATH ELCTR GLIDE PRB 7FR

## (undated) DEVICE — Device

## (undated) DEVICE — SOL IRR POUR H2O 500ML

## (undated) DEVICE — CATH IV SAFE BC 20G X 1.16" (PINK)

## (undated) DEVICE — SYR IV POSIFLUSH NS 3ML 30/TY

## (undated) DEVICE — VALVE BIOPSY

## (undated) DEVICE — SYR LUER SLIP TIP 30CC

## (undated) DEVICE — RADIAL JAW 4 LG CAPACITY WITH NDL

## (undated) DEVICE — SUCTION YANKAUER TAPERED BULBOUS NO VENT

## (undated) DEVICE — CATH IV SAFE BC 22G X 1" (BLUE)

## (undated) DEVICE — MARKER ENDO SPOT EX

## (undated) DEVICE — TUBE O2 SUPL CRUSH RESIS CONN SOUTHSIDE ONLY

## (undated) DEVICE — TRAP QUICK CATCH  SINGL CHAMBER

## (undated) DEVICE — SNARE POLYP SENS 27MM 240CM

## (undated) DEVICE — SOL IRR NS 0.9% 250ML

## (undated) DEVICE — DRSG CURITY GAUZE SPONGE 4 X 4" 12-PLY

## (undated) DEVICE — TUBING IV SET GRAVITY 3Y 100" MACRO

## (undated) DEVICE — SYR ALLIANCE II INFLATION 60ML

## (undated) DEVICE — BRUSH COLONOSCOPY CYTOLOGY

## (undated) DEVICE — TUBING CANNULA SALTER LABS NASAL ADULT 7FT

## (undated) DEVICE — MASK O2 NON REBREATH 3IN1 ADULT

## (undated) DEVICE — STERIS DEFENDO 3-PIECE KIT (AIR/WATER, SUCTION & BIOPSY VALVES)

## (undated) DEVICE — PACK IV START WITH CHG

## (undated) DEVICE — SENSOR O2 FINGER XL ADULT 24/BX 6BX/CA

## (undated) DEVICE — CANISTER SUCTION 1200CC 10/SL

## (undated) DEVICE — SYR LUER LOK 30CC

## (undated) DEVICE — FORMALIN CUPS 10% BUFFERED

## (undated) DEVICE — NDL INJ SCLERO INTERJECT 23G

## (undated) DEVICE — TUBING SUCTION CONN 6FT STERILE

## (undated) DEVICE — ELCTR GROUNDING PAD ADULT COVIDIEN